# Patient Record
Sex: MALE | Race: BLACK OR AFRICAN AMERICAN | Employment: FULL TIME | URBAN - METROPOLITAN AREA
[De-identification: names, ages, dates, MRNs, and addresses within clinical notes are randomized per-mention and may not be internally consistent; named-entity substitution may affect disease eponyms.]

---

## 2017-04-28 PROCEDURE — 88112 CYTOPATH CELL ENHANCE TECH: CPT | Performed by: UROLOGY

## 2017-05-01 ENCOUNTER — HOSPITAL ENCOUNTER (OUTPATIENT)
Dept: LAB | Age: 62
Discharge: HOME | End: 2017-05-01

## 2017-11-03 ENCOUNTER — HOSPITAL ENCOUNTER (OUTPATIENT)
Dept: LAB | Age: 62
Discharge: HOME | End: 2017-11-03

## 2017-11-03 PROCEDURE — 88112 CYTOPATH CELL ENHANCE TECH: CPT | Performed by: UROLOGY

## 2017-11-08 ENCOUNTER — HOSPITAL ENCOUNTER (OUTPATIENT)
Dept: SLEEP MEDICINE | Age: 62
Discharge: HOME | End: 2017-11-08
Payer: COMMERCIAL

## 2017-11-08 PROCEDURE — 95806 SLEEP STUDY UNATT&RESP EFFT: CPT

## 2017-12-06 PROBLEM — E66.9 OBESITY (BMI 30.0-34.9): Status: ACTIVE | Noted: 2017-12-06

## 2017-12-06 PROBLEM — G47.33 OSA (OBSTRUCTIVE SLEEP APNEA): Status: ACTIVE | Noted: 2017-12-06

## 2017-12-06 PROBLEM — G47.10 HYPERSOMNIA: Status: ACTIVE | Noted: 2017-12-06

## 2018-01-24 PROBLEM — E66.09 CLASS 1 OBESITY DUE TO EXCESS CALORIES WITH SERIOUS COMORBIDITY AND BODY MASS INDEX (BMI) OF 34.0 TO 34.9 IN ADULT: Status: ACTIVE | Noted: 2018-01-24

## 2018-02-13 ENCOUNTER — ANESTHESIA EVENT (OUTPATIENT)
Dept: ENDOSCOPY | Age: 63
End: 2018-02-13
Payer: COMMERCIAL

## 2018-02-13 RX ORDER — SODIUM CHLORIDE, SODIUM LACTATE, POTASSIUM CHLORIDE, CALCIUM CHLORIDE 600; 310; 30; 20 MG/100ML; MG/100ML; MG/100ML; MG/100ML
100 INJECTION, SOLUTION INTRAVENOUS CONTINUOUS
Status: CANCELLED | OUTPATIENT
Start: 2018-02-13

## 2018-02-13 RX ORDER — SODIUM CHLORIDE 0.9 % (FLUSH) 0.9 %
5-10 SYRINGE (ML) INJECTION AS NEEDED
Status: CANCELLED | OUTPATIENT
Start: 2018-02-13

## 2018-02-14 ENCOUNTER — ANESTHESIA (OUTPATIENT)
Dept: ENDOSCOPY | Age: 63
End: 2018-02-14
Payer: COMMERCIAL

## 2018-02-14 ENCOUNTER — HOSPITAL ENCOUNTER (OUTPATIENT)
Age: 63
Setting detail: OUTPATIENT SURGERY
Discharge: HOME | End: 2018-02-14
Attending: SURGERY | Admitting: SURGERY
Payer: COMMERCIAL

## 2018-02-14 VITALS
TEMPERATURE: 96.8 F | WEIGHT: 246 LBS | OXYGEN SATURATION: 98 % | RESPIRATION RATE: 16 BRPM | HEIGHT: 71 IN | HEART RATE: 69 BPM | BODY MASS INDEX: 34.44 KG/M2 | DIASTOLIC BLOOD PRESSURE: 79 MMHG | SYSTOLIC BLOOD PRESSURE: 145 MMHG

## 2018-02-14 LAB — GLUCOSE BLD STRIP.AUTO-MCNC: 123 MG/DL (ref 65–100)

## 2018-02-14 PROCEDURE — 82962 GLUCOSE BLOOD TEST: CPT

## 2018-02-14 PROCEDURE — 74011250636 HC RX REV CODE- 250/636

## 2018-02-14 PROCEDURE — 74011250636 HC RX REV CODE- 250/636: Performed by: ANESTHESIOLOGY

## 2018-02-14 PROCEDURE — 76060000032 HC ANESTHESIA 0.5 TO 1 HR: Performed by: SURGERY

## 2018-02-14 PROCEDURE — 76040000025: Performed by: SURGERY

## 2018-02-14 RX ORDER — FAMOTIDINE 20 MG/1
20 TABLET, FILM COATED ORAL AS NEEDED
Status: DISCONTINUED | OUTPATIENT
Start: 2018-02-14 | End: 2018-02-14 | Stop reason: HOSPADM

## 2018-02-14 RX ORDER — SODIUM CHLORIDE, SODIUM LACTATE, POTASSIUM CHLORIDE, CALCIUM CHLORIDE 600; 310; 30; 20 MG/100ML; MG/100ML; MG/100ML; MG/100ML
100 INJECTION, SOLUTION INTRAVENOUS CONTINUOUS
Status: DISCONTINUED | OUTPATIENT
Start: 2018-02-14 | End: 2018-02-14 | Stop reason: HOSPADM

## 2018-02-14 RX ORDER — PROPOFOL 10 MG/ML
INJECTION, EMULSION INTRAVENOUS
Status: DISCONTINUED | OUTPATIENT
Start: 2018-02-14 | End: 2018-02-14 | Stop reason: HOSPADM

## 2018-02-14 RX ORDER — PROPOFOL 10 MG/ML
INJECTION, EMULSION INTRAVENOUS AS NEEDED
Status: DISCONTINUED | OUTPATIENT
Start: 2018-02-14 | End: 2018-02-14 | Stop reason: HOSPADM

## 2018-02-14 RX ADMIN — PROPOFOL 50 MG: 10 INJECTION, EMULSION INTRAVENOUS at 08:56

## 2018-02-14 RX ADMIN — SODIUM CHLORIDE, SODIUM LACTATE, POTASSIUM CHLORIDE, AND CALCIUM CHLORIDE 100 ML/HR: 600; 310; 30; 20 INJECTION, SOLUTION INTRAVENOUS at 08:29

## 2018-02-14 RX ADMIN — PROPOFOL 200 MCG/KG/MIN: 10 INJECTION, EMULSION INTRAVENOUS at 08:56

## 2018-02-14 NOTE — PROGRESS NOTES
Discharge instructions and discharge med list given to pt's wife, Oneida Orozco, voiced understanding.

## 2018-02-14 NOTE — ANESTHESIA POSTPROCEDURE EVALUATION
Post-Anesthesia Evaluation and Assessment    Patient: Kiesha Sousa MRN: [id number]  SSN: [id number]    YOB: 1955  Age: 60 y.o. Sex: male       Cardiovascular Function/Vital Signs  Visit Vitals    /79    Pulse 69    Temp 36 °C (96.8 °F)    Resp 16    Ht 5' 11\" (1.803 m)    Wt 111.6 kg (246 lb)    SpO2 98%    BMI 34.31 kg/m2       Patient is status post total IV anesthesia anesthesia for Procedure(s):  COLONOSCOPY / BMI=34. Nausea/Vomiting: None    Postoperative hydration reviewed and adequate. Pain:  Pain Scale 1: Numeric (0 - 10) (02/14/18 0943)  Pain Intensity 1: 0 (02/14/18 0943)   Managed    Neurological Status: At baseline    Mental Status and Level of Consciousness: Arousable    Pulmonary Status:   O2 Device: Room air (02/14/18 0943)   Adequate oxygenation and airway patent    Complications related to anesthesia: None    Post-anesthesia assessment completed.  No concerns    Signed By: Sophia Figueredo MD     February 14, 2018

## 2018-02-14 NOTE — ANESTHESIA PREPROCEDURE EVALUATION
Anesthetic History   No history of anesthetic complications            Review of Systems / Medical History  Patient summary reviewed and pertinent labs reviewed    Pulmonary        Sleep apnea: CPAP           Neuro/Psych   Within defined limits           Cardiovascular    Hypertension: well controlled              Exercise tolerance: >4 METS     GI/Hepatic/Renal     GERD: well controlled           Endo/Other    Diabetes: well controlled, type 2    Obesity and arthritis    Comments: Borderline DM, no meds Other Findings              Physical Exam    Airway  Mallampati: II  TM Distance: > 6 cm  Neck ROM: normal range of motion   Mouth opening: Normal     Cardiovascular  Regular rate and rhythm,  S1 and S2 normal,  no murmur, click, rub, or gallop  Rhythm: regular  Rate: normal         Dental  No notable dental hx       Pulmonary  Breath sounds clear to auscultation               Abdominal  GI exam deferred       Other Findings            Anesthetic Plan    ASA: 2  Anesthesia type: total IV anesthesia          Induction: Intravenous  Anesthetic plan and risks discussed with: Patient

## 2018-02-14 NOTE — INTERVAL H&P NOTE
H&P Update:  Lucho Hollingsworth. was seen and examined. History and physical has been reviewed. The patient has been examined.  There have been no significant clinical changes since the completion of the originally dated History and Physical.    Signed By: Mars Trivedi MD     February 14, 2018 7:10 AM

## 2018-02-14 NOTE — OP NOTES
[facility] REPORT    Salazar Ny  MR#: [id number]  : 1955  ACCOUNT #: [de-identified]   DATE OF SERVICE: 2018    PREOPERATIVE DIAGNOSIS:  Personal history of colon polyps. POSTOPERATIVE DIAGNOSES:  1. Good bowel prep. 2.  Grade I internal hemorrhoids. 3.  No masses, polyps or bleeding noted. PROCEDURE PERFORMED:  Colonoscopy. SURGEON:  Samson France MD.     ASSISTANT:  None     ANESTHESIA:  Monitored anesthesia care with Dr. Medina Patel and Casimiro Moncada.    ESTIMATED BLOOD LOSS:  None. SPECIMENS REMOVED:  None. COMPLICATIONS:  None. IMPLANTS:  None. HISTORY OF PRESENT ILLNESS:  This is a 44-year-old male who was referred by Dr. Eli Quinteros for a colonoscopy. He had a personal history of colon polyps and one was recommended at this point. I saw the patient in the office And went through the procedure, risks of bleeding, infection, anesthesia, perforation of the colon. He was agreeable. The patient underwent a bowel prep on 2018 and came in today for the procedure. DESCRIPTION OF PROCEDURE:  He was seen in the preoperative area and then transported to room #4 at [facility]. He was placed in a stretcher in left lateral decubitus position. A timeout was done identifying the patient, surgical procedure and his birthdate of 1955. When everyone in the room agreed, we began the procedure. Monitored anesthesia care was done by Dr. Medina Patel and Julian Nugent. Once the patient had monitored anesthesia care done and once the sedative effect had taken hold, a colonoscope was advanced through the anus and all the way to the cecum. Cecum was identified with the ileocecal valve, cecal folds, external compression in the right lower quadrant corresponded to an indentation seen with the scope. Scope was slowly withdrawn over a 7-minute period of time.   There were no lesions noted in the cecum, ascending colon, transverse colon, descending colon and sigmoid colon. Scope was brought back to the rectum where it was retroflexed. He had some grade I internal hemorrhoids. There were no masses, no polyps, no bleeding noted. Scope was withdrawn. Digital rectal exam revealed good sphincter tone. Prostate mildly enlarged. No nodules palpated. FINDINGS:   1. Good bowel prep. 2.  Grade I internal hemorrhoids. 3.  No masses, polyps. PLAN:  Recommend repeat in ten years.       MD JOSEFA Brewster / CAYLA  D: 02/14/2018 09:20     T: 02/14/2018 09:54  JOB #: [id number]  CC: Estephania Guerra MD

## 2018-02-14 NOTE — IP AVS SNAPSHOT
Jovanny Rob 
 
 
 [address] [facility] [address] [facility] 
[phone] Patient: Georgiana Cortez. MRN: [id number] ZEC:1/1/7798 About your hospitalization You were admitted on:  February 14, 2018 You last received care in the:  [facility] You were discharged on:  February 14, 2018 Why you were hospitalized Your primary diagnosis was:  Not on File Follow-up Information None Your Scheduled Appointments Friday March 02, 2018  9:20 AM EST  
(Arrive by 9:15 AM) Return appointment with 8 [address] [place] [address] [address] ([facility]) [address] [place] [address]  
[phone] Discharge Orders None A check alexx indicates which time of day the medication should be taken. My Medications ASK your doctor about these medications Instructions Each Dose to Equal  
 Morning Noon Evening Bedtime  
 aspirin delayed-release 81 mg tablet Your last dose was: Your next dose is: Take 81 mg by mouth every morning. Stopped 2/7/18 per dr Faisal Marshall 81 mg  
    
   
   
   
  
 cloNIDine HCl 0.1 mg tablet Commonly known as:  CATAPRES Your last dose was: Your next dose is: Take 1 Tab by mouth two (2) times a day. 0.1 mg  
    
   
   
   
  
 hydrALAZINE 100 mg tablet Commonly known as:  APRESOLINE Your last dose was: Your next dose is: Take 1 Tab by mouth two (2) times a day. 100 mg  
    
   
   
   
  
 potassium chloride 20 mEq tablet Commonly known as:  K-DUR, KLOR-CON Your last dose was: Your next dose is:    
   
   
 take 1 tablet by mouth once daily BEFORE LUNCH  
     
   
   
   
  
 raNITIdine 150 mg tablet Commonly known as:  ZANTAC Your last dose was: Your next dose is: Take 1 Tab by mouth as needed.   
 150 mg  
    
   
 simvastatin 20 mg tablet Commonly known as:  ZOCOR Your last dose was: Your next dose is: Take 1 Tab by mouth nightly. 20 mg  
    
   
   
   
  
 triamterene-hydroCHLOROthiazide 37.5-25 mg per capsule Commonly known as:  Johanna Bowles Your last dose was: Your next dose is:    
   
   
 take 1 capsule by mouth every morning Discharge Instructions Gastrointestinal Colonoscopy/Flexible Sigmoidoscopy - Lower Exam Discharge Instructions 1. Call Dr. Marilou Mead at [phone] for any problems or questions. 2. Contact the doctors office for follow up appointment as directed 3. Medication may cause drowsiness for several hours, therefore, do not drive or operate machinery for remainder of the day. 4. No alcohol today. 5. Ordinarily, you may resume regular diet and activity after exam unless otherwise specified by your physician. 6. Because of air put into your colon during exam, you may experience some abdominal distension, relieved by the passage of gas, for several hours. 7. Contact your physician if you have any of the following: 
a. Excessive amount of bleeding  large amount when having a bowel movement. Occasional specks of blood with bowel movement would not be unusual. 
b. Severe abdominal pain 
c. Fever or Chills Any additional instructions:   
Repeat Colonoscopy in 10 years. Instructions given to Sweta Oakley. and other family members. Instructions given by:  Caroline Goodwin RN Introducing [facility]! Dear Jorje Rick: 
Thank you for requesting a [employer] account. Our records indicate that you already have an active [employer] account. You can access your account anytime at [email or web address] Did you know that you can access your hospital and ER discharge instructions at any time in [employer]?   You can also review all of your test results from your hospital stay or ER visit. Additional Information If you have questions, please visit the Frequently Asked Questions section of the [employer] website at [email or web address] Remember, MyChart is NOT to be used for urgent needs. For medical emergencies, dial 911. Now available from your iPhone and Android! Providers Seen During Your Hospitalization Provider Specialty Primary office phone Ann Sheets, [address] [place] [address] Surgery [phone] Your Primary Care Physician (PCP) Primary Care Physician Office Phone Office Fax Tamera Hansen [phone] You are allergic to the following Allergen Reactions Seibert Anaphylaxis Nuts (Tree Nut) Anaphylaxis Tongue and throat swelling with almonds. States that he recently noticed symptoms with eating peanuts. Other Omega-3s Anaphylaxis Please remove-no omega 3 allergy. Almonds cause throat to swell closed-- NOT OMEGA 3 Ace Inhibitors Cough Codeine Nausea and Vomiting Other Medication Swelling Almonds-please remove-already listed Recent Documentation Height Weight BMI Smoking Status 1.803 m 111.6 kg 34.31 kg/m2 Never Smoker Emergency Contacts Name Discharge Info Relation Home Work Mobile Matthew Garces  Spouse [3] [phone] Patient Belongings The following personal items are in your possession at time of discharge: 
  Dental Appliances: None  Visual Aid: Glasses Please provide this summary of care documentation to your next provider. Signatures-by signing, you are acknowledging that this After Visit Summary has been reviewed with you and you have received a copy. Patient Signature:  ____________________________________________________________ Date:  ____________________________________________________________ `    
    
 Provider Signature:  ____________________________________________________________ Date:  ____________________________________________________________

## 2018-02-14 NOTE — DISCHARGE INSTRUCTIONS
Gastrointestinal Colonoscopy/Flexible Sigmoidoscopy - Lower Exam Discharge Instructions  1. Call Dr. Robert Claude at [phone] for any problems or questions. 2. Contact the doctors office for follow up appointment as directed  3. Medication may cause drowsiness for several hours, therefore, do not drive or operate machinery for remainder of the day. 4. No alcohol today. 5. Ordinarily, you may resume regular diet and activity after exam unless otherwise specified by your physician. 6. Because of air put into your colon during exam, you may experience some abdominal distension, relieved by the passage of gas, for several hours. 7. Contact your physician if you have any of the following:  a. Excessive amount of bleeding - large amount when having a bowel movement. Occasional specks of blood with bowel movement would not be unusual.  b. Severe abdominal pain  c. Fever or Chills  Any additional instructions:    Repeat Colonoscopy in 10 years. Instructions given to Elsa Layne. and other family members.   Instructions given by:  Sandhya No RN

## 2018-02-14 NOTE — H&P (VIEW-ONLY)
Name: Johnnie Walker MRN: [id number]       : 1955       Age: 60 y.o. Sex: male        Corwin Butler MD       CC:    Chief Complaint   Patient presents with    New Patient     colonoscopy       HPI:  The patient is referred here for a colonoscopy by Dr. Shon Sheffield. He had several polyps removed in  at [facility]. A five year follow up procedure was recommended. Family hx of colon cancer YES. The patient's father had colon cancer      Previous colonoscopy YES.  Last on    BRBPR NO   Melena NO   Loss of appetite NO   Weight loss NO      Change in bowel habits NO       HISTORY:    Past Medical History:   Diagnosis Date    Arthritis     back-     Chronic pain     back- denies pain at this time 3/28/13    Colon polyps 2013    Coronary atherosclerosis of native coronary vessel 3/28/2016    DDD (degenerative disc disease) 2013    lower back    Diabetes mellitus ([address] 2013 HA1C 5.7; no meds, diet controlled    Dyspnea     GERD (gastroesophageal reflux disease)     rarely takes an OTC zantac    History of kidney stones     x 1, passed on own    Hypertension     controlled by medication    Hypertrophy of prostate with urinary obstruction and other lower urinary tract symptoms (LUTS) 2014    Hypertrophy of prostate with urinary obstruction and other lower urinary tract symptoms (LUTS)     Malignant neoplasm of bladder, part unspecified     monitored and treated by Urology    Morbid obesity ([address]     bmi 34    Pure hypercholesterolemia     managed with medication     Urinary frequency      Past Surgical History:   Procedure Laterality Date    CLOSE CYSTOSTOMY  2012    several cysto w/ bladder biopsy; was cancer    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 Authorizing Provider   Lancets (ONETOUCH ULTRASOFT LANCETS) misc by Does Not Apply route daily. 1/24/18   Naina Sterling MD   potassium chloride (K-DUR, KLOR-CON) 20 mEq tablet take 1 tablet by mouth once daily BEFORE LUNCH 1/24/18   Naina Sterling MD   simvastatin (ZOCOR) 20 mg tablet Take 1 Tab by mouth nightly. 1/24/18   Naina Sterling MD   triamterene-hydroCHLOROthiazide (DYAZIDE) 37.5-25 mg per capsule take 1 capsule by mouth every morning 1/24/18   Naina Sterling MD   glucose blood VI test strips [facility] ULTRA TEST) strip TEST as directed once daily 1/24/18   Naina Sterling MD   OTHER Glucometer 1/24/18   Naina Sterling MD   hydrALAZINE (APRESOLINE) 100 mg tablet Take 1 Tab by mouth two (2) times a day. 10/19/17   Gala Magana DO   cloNIDine HCl (CATAPRES) 0.1 mg tablet Take 1 Tab by mouth two (2) times a day. 4/20/17   Gala Magana DO   aspirin delayed-release 81 mg tablet Take 81 mg by mouth every morning. Historical Provider   ranitidine (ZANTAC) 150 mg tablet Take 1 Tab by mouth as needed. 1/6/16   Naina Sterling MD   multivitamin (ONE A DAY) tablet Take 1 Tab by mouth every Monday and Friday. Historical Provider     Social History   Substance Use Topics    Smoking status: Never Smoker    Smokeless tobacco: Never Used    Alcohol use No     Family History   Problem Relation Age of Onset    Stroke Mother     Diabetes Mother     Hypertension Mother     Stroke Father     Coronary Artery Disease Father     Cancer Father      prostate    Heart Disease Father     Hypertension Sister     Hypertension Brother     Hypertension Brother      . Allergies   Allergen Reactions    Matador Anaphylaxis    Nuts [Tree Nut] Anaphylaxis     Tongue and throat swelling with almonds. States that he recently noticed symptoms with eating peanuts.      Other Omega-3s Anaphylaxis     Please remove-no omega 3 allergy. Almonds cause throat to swell closed-- NOT OMEGA 3    Ace Inhibitors Cough    Codeine Nausea and Vomiting    Other Medication Swelling     Almonds-please remove-already listed       Physical Exam:     Visit Vitals    /81    Pulse 61    Ht 5' 11\" (1.803 m)    Wt 251 lb 12.8 oz (114.2 kg)    BMI 35.12 kg/m2       General: Alert, oriented, cooperative black male in no acute distress. Resp: Breathing is  non-labored. Lungs clear to auscultation without wheezing or rhonchi   CV: RRR. No murmurs, rubs or gallops appreciated. Abd: soft non-tender and non-distended without peritoneal signs. +bs    Extremities: No clubbing, cyanosis or edema. Strength intact. Assessment/Plan:  Angeles Cano is a 60 y.o. male who is here for a colonoscopy due to a personal history of polyps and a family history of colon cancer. 1. Off ASA for one week, if on aspirin    2. Bowel prep    3. Colonoscopy with MAC. I went through the risks of bleeding, perforation of the colon and cardiopulmonary problems that can develop with the use of anesthesia.     Klever Fitzgerald MD     FACS   2/5/2018  3:34 PM

## 2018-05-11 PROCEDURE — 88112 CYTOPATH CELL ENHANCE TECH: CPT | Performed by: UROLOGY

## 2018-05-14 ENCOUNTER — HOSPITAL ENCOUNTER (OUTPATIENT)
Dept: LAB | Age: 63
Discharge: HOME | End: 2018-05-14

## 2018-05-24 ENCOUNTER — HOSPITAL ENCOUNTER (OUTPATIENT)
Dept: MRI IMAGING | Age: 63
Discharge: HOME | End: 2018-05-24
Attending: UROLOGY
Payer: COMMERCIAL

## 2018-05-24 DIAGNOSIS — C67.9 MALIGNANT NEOPLASM OF URINARY BLADDER, UNSPECIFIED SITE (HCC): ICD-10-CM

## 2018-05-24 LAB — CREAT BLD-MCNC: 0.8 MG/DL (ref 0.8–1.5)

## 2018-05-24 PROCEDURE — 82565 ASSAY OF CREATININE: CPT

## 2018-05-24 PROCEDURE — A9577 INJ MULTIHANCE: HCPCS | Performed by: UROLOGY

## 2018-05-24 PROCEDURE — 74011000258 HC RX REV CODE- 258: Performed by: UROLOGY

## 2018-05-24 PROCEDURE — 74011250636 HC RX REV CODE- 250/636: Performed by: UROLOGY

## 2018-05-24 PROCEDURE — 72197 MRI PELVIS W/O & W/DYE: CPT

## 2018-05-24 RX ORDER — SODIUM CHLORIDE 0.9 % (FLUSH) 0.9 %
10 SYRINGE (ML) INJECTION
Status: COMPLETED | OUTPATIENT
Start: 2018-05-24 | End: 2018-05-24

## 2018-05-24 RX ADMIN — Medication 10 ML: at 11:04

## 2018-05-24 RX ADMIN — SODIUM CHLORIDE 100 ML: 900 INJECTION, SOLUTION INTRAVENOUS at 11:04

## 2018-05-24 RX ADMIN — GADOBENATE DIMEGLUMINE 10 ML: 529 INJECTION, SOLUTION INTRAVENOUS at 11:04

## 2018-11-12 ENCOUNTER — HOSPITAL ENCOUNTER (OUTPATIENT)
Dept: LAB | Age: 63
Discharge: HOME | End: 2018-11-12

## 2018-11-12 PROCEDURE — 88112 CYTOPATH CELL ENHANCE TECH: CPT

## 2019-12-06 PROCEDURE — 88112 CYTOPATH CELL ENHANCE TECH: CPT

## 2019-12-09 ENCOUNTER — HOSPITAL ENCOUNTER (OUTPATIENT)
Dept: LAB | Age: 64
Discharge: HOME | End: 2019-12-09

## 2020-01-27 PROBLEM — E66.09 CLASS 1 OBESITY DUE TO EXCESS CALORIES WITH SERIOUS COMORBIDITY AND BODY MASS INDEX (BMI) OF 34.0 TO 34.9 IN ADULT: Status: RESOLVED | Noted: 2018-01-24 | Resolved: 2020-01-27

## 2020-05-28 ENCOUNTER — HOSPITAL ENCOUNTER (OUTPATIENT)
Dept: MRI IMAGING | Age: 65
Discharge: HOME | End: 2020-05-28
Attending: UROLOGY
Payer: COMMERCIAL

## 2020-05-28 DIAGNOSIS — R97.20 ELEVATED PSA: ICD-10-CM

## 2020-05-28 PROCEDURE — 74011250636 HC RX REV CODE- 250/636: Performed by: UROLOGY

## 2020-05-28 PROCEDURE — 72197 MRI PELVIS W/O & W/DYE: CPT

## 2020-05-28 PROCEDURE — 74011000258 HC RX REV CODE- 258: Performed by: UROLOGY

## 2020-05-28 PROCEDURE — A9575 INJ GADOTERATE MEGLUMI 0.1ML: HCPCS | Performed by: UROLOGY

## 2020-05-28 RX ORDER — GADOTERATE MEGLUMINE 376.9 MG/ML
20 INJECTION INTRAVENOUS
Status: COMPLETED | OUTPATIENT
Start: 2020-05-28 | End: 2020-05-28

## 2020-05-28 RX ORDER — SODIUM CHLORIDE 0.9 % (FLUSH) 0.9 %
10 SYRINGE (ML) INJECTION
Status: COMPLETED | OUTPATIENT
Start: 2020-05-28 | End: 2020-05-28

## 2020-05-28 RX ADMIN — GADOTERATE MEGLUMINE 20 ML: 376.9 INJECTION INTRAVENOUS at 08:17

## 2020-05-28 RX ADMIN — Medication 10 ML: at 08:17

## 2020-05-28 RX ADMIN — SODIUM CHLORIDE 100 ML: 900 INJECTION, SOLUTION INTRAVENOUS at 08:17

## 2021-06-30 ENCOUNTER — ANESTHESIA EVENT (OUTPATIENT)
Dept: SURGERY | Age: 66
End: 2021-06-30
Payer: COMMERCIAL

## 2021-07-01 ENCOUNTER — ANESTHESIA (OUTPATIENT)
Dept: SURGERY | Age: 66
End: 2021-07-01
Payer: COMMERCIAL

## 2021-07-01 ENCOUNTER — HOSPITAL ENCOUNTER (OUTPATIENT)
Age: 66
Setting detail: OUTPATIENT SURGERY
Discharge: HOME | End: 2021-07-01
Attending: UROLOGY | Admitting: UROLOGY
Payer: COMMERCIAL

## 2021-07-01 VITALS
TEMPERATURE: 97.5 F | DIASTOLIC BLOOD PRESSURE: 74 MMHG | RESPIRATION RATE: 15 BRPM | BODY MASS INDEX: 31.66 KG/M2 | WEIGHT: 227 LBS | HEART RATE: 69 BPM | SYSTOLIC BLOOD PRESSURE: 144 MMHG | OXYGEN SATURATION: 97 %

## 2021-07-01 DIAGNOSIS — N21.0 BLADDER STONES: ICD-10-CM

## 2021-07-01 LAB
ANION GAP SERPL CALC-SCNC: 6 MMOL/L (ref 7–16)
APPEARANCE UR: CLEAR
BILIRUB UR QL: NEGATIVE
BUN SERPL-MCNC: 11 MG/DL (ref 8–23)
CALCIUM SERPL-MCNC: 8.9 MG/DL (ref 8.3–10.4)
CHLORIDE SERPL-SCNC: 108 MMOL/L (ref 98–107)
CO2 SERPL-SCNC: 27 MMOL/L (ref 21–32)
COLOR UR: YELLOW
CREAT SERPL-MCNC: 0.82 MG/DL (ref 0.8–1.5)
ERYTHROCYTE [DISTWIDTH] IN BLOOD BY AUTOMATED COUNT: 14.5 % (ref 11.9–14.6)
GLUCOSE BLD STRIP.AUTO-MCNC: 150 MG/DL (ref 65–100)
GLUCOSE SERPL-MCNC: 159 MG/DL (ref 65–100)
GLUCOSE UR STRIP.AUTO-MCNC: NEGATIVE MG/DL
HCT VFR BLD AUTO: 42.6 % (ref 41.1–50.3)
HGB BLD-MCNC: 13.2 G/DL (ref 13.6–17.2)
HGB UR QL STRIP: NEGATIVE
KETONES UR QL STRIP.AUTO: NEGATIVE MG/DL
LEUKOCYTE ESTERASE UR QL STRIP.AUTO: NEGATIVE
MCH RBC QN AUTO: 26.3 PG (ref 26.1–32.9)
MCHC RBC AUTO-ENTMCNC: 31 G/DL (ref 31.4–35)
MCV RBC AUTO: 84.9 FL (ref 79.6–97.8)
NITRITE UR QL STRIP.AUTO: NEGATIVE
NRBC # BLD: 0 K/UL (ref 0–0.2)
PH UR STRIP: 5.5 [PH] (ref 5–9)
PLATELET # BLD AUTO: 145 K/UL (ref 150–450)
PMV BLD AUTO: 12.3 FL (ref 9.4–12.3)
POTASSIUM BLD-SCNC: 3.9 MMOL/L (ref 3.5–5.1)
POTASSIUM SERPL-SCNC: 3.8 MMOL/L (ref 3.5–5.1)
PROT UR STRIP-MCNC: NEGATIVE MG/DL
RBC # BLD AUTO: 5.02 M/UL (ref 4.23–5.6)
SERVICE CMNT-IMP: ABNORMAL
SODIUM SERPL-SCNC: 141 MMOL/L (ref 138–145)
SP GR UR REFRACTOMETRY: 1.02 (ref 1–1.02)
UROBILINOGEN UR QL STRIP.AUTO: 0.2 EU/DL (ref 0.2–1)
WBC # BLD AUTO: 5.4 K/UL (ref 4.3–11.1)

## 2021-07-01 PROCEDURE — 76210000006 HC OR PH I REC 0.5 TO 1 HR: Performed by: UROLOGY

## 2021-07-01 PROCEDURE — 87086 URINE CULTURE/COLONY COUNT: CPT

## 2021-07-01 PROCEDURE — 74011250636 HC RX REV CODE- 250/636: Performed by: UROLOGY

## 2021-07-01 PROCEDURE — 74011250636 HC RX REV CODE- 250/636: Performed by: NURSE ANESTHETIST, CERTIFIED REGISTERED

## 2021-07-01 PROCEDURE — 80048 BASIC METABOLIC PNL TOTAL CA: CPT

## 2021-07-01 PROCEDURE — 84132 ASSAY OF SERUM POTASSIUM: CPT

## 2021-07-01 PROCEDURE — 77030033648: Performed by: UROLOGY

## 2021-07-01 PROCEDURE — 76060000033 HC ANESTHESIA 1 TO 1.5 HR: Performed by: UROLOGY

## 2021-07-01 PROCEDURE — 2709999900 HC NON-CHARGEABLE SUPPLY: Performed by: UROLOGY

## 2021-07-01 PROCEDURE — 81003 URINALYSIS AUTO W/O SCOPE: CPT

## 2021-07-01 PROCEDURE — 82355 CALCULUS ANALYSIS QUAL: CPT

## 2021-07-01 PROCEDURE — 82962 GLUCOSE BLOOD TEST: CPT

## 2021-07-01 PROCEDURE — 74011250637 HC RX REV CODE- 250/637: Performed by: ANESTHESIOLOGY

## 2021-07-01 PROCEDURE — 77030040361 HC SLV COMPR DVT MDII -B: Performed by: UROLOGY

## 2021-07-01 PROCEDURE — 85027 COMPLETE CBC AUTOMATED: CPT

## 2021-07-01 PROCEDURE — 52318 REMOVE BLADDER STONE: CPT | Performed by: UROLOGY

## 2021-07-01 PROCEDURE — 74011250636 HC RX REV CODE- 250/636: Performed by: ANESTHESIOLOGY

## 2021-07-01 PROCEDURE — 76010000138 HC OR TIME 0.5 TO 1 HR: Performed by: UROLOGY

## 2021-07-01 PROCEDURE — 88300 SURGICAL PATH GROSS: CPT

## 2021-07-01 PROCEDURE — 77030019927 HC TBNG IRR CYSTO BAXT -A: Performed by: UROLOGY

## 2021-07-01 PROCEDURE — 77030018831 HC SOL IRR H20 BAXT -A: Performed by: UROLOGY

## 2021-07-01 PROCEDURE — 77030010509 HC AIRWY LMA MSK TELE -A: Performed by: NURSE ANESTHETIST, CERTIFIED REGISTERED

## 2021-07-01 PROCEDURE — 74011000250 HC RX REV CODE- 250: Performed by: NURSE ANESTHETIST, CERTIFIED REGISTERED

## 2021-07-01 PROCEDURE — 76210000020 HC REC RM PH II FIRST 0.5 HR: Performed by: UROLOGY

## 2021-07-01 RX ORDER — PHENAZOPYRIDINE HYDROCHLORIDE 200 MG/1
200 TABLET, FILM COATED ORAL
Qty: 30 TABLET | Refills: 3 | Status: SHIPPED | OUTPATIENT
Start: 2021-07-01 | End: 2021-07-04

## 2021-07-01 RX ORDER — ONDANSETRON 2 MG/ML
INJECTION INTRAMUSCULAR; INTRAVENOUS AS NEEDED
Status: DISCONTINUED | OUTPATIENT
Start: 2021-07-01 | End: 2021-07-01 | Stop reason: HOSPADM

## 2021-07-01 RX ORDER — LIDOCAINE HYDROCHLORIDE 10 MG/ML
0.3 INJECTION INFILTRATION; PERINEURAL ONCE
Status: DISCONTINUED | OUTPATIENT
Start: 2021-07-01 | End: 2021-07-01 | Stop reason: HOSPADM

## 2021-07-01 RX ORDER — PROPOFOL 10 MG/ML
INJECTION, EMULSION INTRAVENOUS AS NEEDED
Status: DISCONTINUED | OUTPATIENT
Start: 2021-07-01 | End: 2021-07-01 | Stop reason: HOSPADM

## 2021-07-01 RX ORDER — CEFAZOLIN SODIUM/WATER 2 G/20 ML
2 SYRINGE (ML) INTRAVENOUS
Status: COMPLETED | OUTPATIENT
Start: 2021-07-01 | End: 2021-07-01

## 2021-07-01 RX ORDER — OXYCODONE HYDROCHLORIDE 5 MG/1
10 TABLET ORAL
Status: DISCONTINUED | OUTPATIENT
Start: 2021-07-01 | End: 2021-07-01 | Stop reason: HOSPADM

## 2021-07-01 RX ORDER — SODIUM CHLORIDE, SODIUM LACTATE, POTASSIUM CHLORIDE, CALCIUM CHLORIDE 600; 310; 30; 20 MG/100ML; MG/100ML; MG/100ML; MG/100ML
100 INJECTION, SOLUTION INTRAVENOUS CONTINUOUS
Status: DISCONTINUED | OUTPATIENT
Start: 2021-07-01 | End: 2021-07-01 | Stop reason: HOSPADM

## 2021-07-01 RX ORDER — HYDROMORPHONE HYDROCHLORIDE 1 MG/ML
0.5 INJECTION, SOLUTION INTRAMUSCULAR; INTRAVENOUS; SUBCUTANEOUS
Status: DISCONTINUED | OUTPATIENT
Start: 2021-07-01 | End: 2021-07-01 | Stop reason: HOSPADM

## 2021-07-01 RX ORDER — LIDOCAINE HYDROCHLORIDE 20 MG/ML
INJECTION, SOLUTION EPIDURAL; INFILTRATION; INTRACAUDAL; PERINEURAL AS NEEDED
Status: DISCONTINUED | OUTPATIENT
Start: 2021-07-01 | End: 2021-07-01 | Stop reason: HOSPADM

## 2021-07-01 RX ORDER — MIDAZOLAM HYDROCHLORIDE 1 MG/ML
2 INJECTION, SOLUTION INTRAMUSCULAR; INTRAVENOUS
Status: DISCONTINUED | OUTPATIENT
Start: 2021-07-01 | End: 2021-07-01 | Stop reason: HOSPADM

## 2021-07-01 RX ORDER — FENTANYL CITRATE 50 UG/ML
INJECTION, SOLUTION INTRAMUSCULAR; INTRAVENOUS AS NEEDED
Status: DISCONTINUED | OUTPATIENT
Start: 2021-07-01 | End: 2021-07-01 | Stop reason: HOSPADM

## 2021-07-01 RX ORDER — ACETAMINOPHEN 500 MG
1000 TABLET ORAL ONCE
Status: COMPLETED | OUTPATIENT
Start: 2021-07-01 | End: 2021-07-01

## 2021-07-01 RX ADMIN — CEFAZOLIN 2 G: 1 INJECTION, POWDER, FOR SOLUTION INTRAVENOUS at 07:40

## 2021-07-01 RX ADMIN — ONDANSETRON 4 MG: 2 INJECTION INTRAMUSCULAR; INTRAVENOUS at 08:00

## 2021-07-01 RX ADMIN — PROPOFOL 200 MG: 10 INJECTION, EMULSION INTRAVENOUS at 07:36

## 2021-07-01 RX ADMIN — FENTANYL CITRATE 50 MCG: 50 INJECTION INTRAMUSCULAR; INTRAVENOUS at 07:36

## 2021-07-01 RX ADMIN — ACETAMINOPHEN 1000 MG: 500 TABLET ORAL at 06:32

## 2021-07-01 RX ADMIN — LIDOCAINE HYDROCHLORIDE 100 MG: 20 INJECTION, SOLUTION EPIDURAL; INFILTRATION; INTRACAUDAL; PERINEURAL at 07:36

## 2021-07-01 RX ADMIN — SODIUM CHLORIDE, SODIUM LACTATE, POTASSIUM CHLORIDE, AND CALCIUM CHLORIDE 100 ML/HR: 600; 310; 30; 20 INJECTION, SOLUTION INTRAVENOUS at 06:32

## 2021-07-01 NOTE — BRIEF OP NOTE
Brief Postoperative Note    Patient: Bryant Reese. YOB: 1955  MRN: [id number]    Date of Procedure: 7/1/2021     Pre-Op Diagnosis: Bladder stone [N21.0]    Post-Op Diagnosis: Same as preoperative diagnosis.       Procedure(s):  CYSTOLITHOLAPAXY    Surgeon(s):  Deloris Goodson MD    Surgical Assistant: None    Anesthesia: General     Estimated Blood Loss (mL): Minimal    Complications: None    Specimens:   ID Type Source Tests Collected by Time Destination   1 : bladder stones Fresh Bladder  Deloris Goodson MD 7/1/2021 7716 Pathology        Implants: * No implants in log *    Drains: * No LDAs found *    Findings: see op note    Electronically Signed by Jose Lion MD on 7/1/2021 at 8:25 AM

## 2021-07-01 NOTE — ANESTHESIA POSTPROCEDURE EVALUATION
Procedure(s):  CYSTOLITHOLAPAXY.     general    Anesthesia Post Evaluation      Multimodal analgesia: multimodal analgesia used between 6 hours prior to anesthesia start to PACU discharge  Patient location during evaluation: PACU  Patient participation: complete - patient participated  Level of consciousness: awake and alert  Pain management: adequate  Airway patency: patent  Anesthetic complications: no  Cardiovascular status: acceptable  Respiratory status: acceptable  Hydration status: acceptable  Post anesthesia nausea and vomiting:  none      INITIAL Post-op Vital signs:   Vitals Value Taken Time   /74 07/01/21 0900   Temp 36.4 °C (97.5 °F) 07/01/21 0900   Pulse 69 07/01/21 0900   Resp 15 07/01/21 0900   SpO2 97 % 07/01/21 0900

## 2021-07-01 NOTE — DISCHARGE INSTRUCTIONS
Cystoscopy: What to Expect at [address]  A cystoscopy is a procedure that lets a doctor look inside of the bladder and the urethra. The urethra is the tube that carries urine from the bladder to outside the body. The doctor uses a thin, lighted tube called a cystoscope to look for kidney or bladder stones, tumors, bleeding, or infection. After the cystoscopy, your urethra may be sore at first, and it may burn when you urinate for the first few days after the procedure. You may feel the need to urinate more often, and your urine may be pink. These symptoms should get better in 1 or 2 days. You will probably be able to go back to work or most of your usual activities in 1 or 2 days. How can you care for yourself at home? Activity  · Rest when you feel tired. Getting enough sleep will help you recover. · Try to walk each day. Start by walking a little more than you did the day before. Bit by bit, increase the amount you walk. Walking boosts blood flow and helps prevent pneumonia and constipation. · Avoid strenuous activities, such as bicycle riding, jogging, weight lifting, or aerobic exercise, until your doctor says it is okay. · Ask your doctor when you can drive again. · Most people are able to return to work within 1 or 2 days after the procedure. · You may shower and take baths as usual.  · Ask your doctor when it is okay for you to have sex. Diet  · You can eat your normal diet. If your stomach is upset, try bland, low-fat foods like plain rice, broiled chicken, toast, and yogurt. · Drink plenty of fluids (unless your doctor tells you not to). Medicines  · Take pain medicines exactly as directed. ¨ If the doctor gave you a prescription medicine for pain, take it as prescribed. ¨ If you are not taking a prescription pain medicine, ask your doctor if you can take an over-the-counter medicine.   · If you think your pain medicine is making you sick to your stomach:  ¨ Take your medicine after meals (unless your doctor has told you not to). ¨ Ask your doctor for a different pain medicine. · If your doctor prescribed antibiotics, take them as directed. Do not stop taking them just because you feel better. You need to take the full course of antibiotics. Medication Interaction:  During your procedure you potentially received a medication or medications which may reduce the effectiveness of oral contraceptives. Please consider other forms of contraception for 1 month following your procedure if you are currently using oral contraceptives as your primary form of birth control. In addition to this, we recommend continuing your oral contraceptive as prescribed, unless otherwise instructed by your physician, during this time. Follow-up care is a key part of your treatment and safety. Be sure to make and go to all appointments, and call your doctor if you are having problems. It's also a good idea to know your test results and keep a list of the medicines you take. When should you call for help? Call 911 anytime you think you may need emergency care. For example, call if:  · You passed out (lost consciousness). · You have severe trouble breathing. · You have sudden chest pain and shortness of breath, or you cough up blood. · You have severe belly pain. Call your doctor now or seek immediate medical care if:  · You are sick to your stomach or cannot keep fluids down. · Your urine is still red or you see blood clots after you have urinated several times. · You have trouble passing urine or stool, especially if you have pain or swelling in your lower belly. · You have signs of a blood clot, such as:  ¨ Pain in your calf, back of the knee, thigh, or groin. ¨ Redness and swelling in your leg or groin. · You develop a fever or severe chills. · You have pain in your back just below your rib cage. This is called flank pain.   Watch closely for changes in your health, and be sure to contact your doctor if: · A burning feeling is normal for a day or two after the test, but call if it does not get better. · You have a frequent urge to urinate but can pass only small amounts of urine. · It is normal for the urine to have a pinkish color for a few days after the test, but call if it does not get better or if your urine is cloudy, smells bad, or has pus in it. After general anesthesia or intravenous sedation, for 24 hours or while taking prescription Narcotics:  · Limit your activities  · A responsible adult needs to be with you for the next 24 hours  · Do not drive and operate hazardous machinery  · Do not make important personal or business decisions  · Do not drink alcoholic beverages  · If you have not urinated within 8 hours after discharge, and you are experiencing discomfort from urinary retention, please go to the nearest ED. · If you have sleep apnea and have a CPAP machine, please use it for all naps and sleeping. · Please use caution when taking narcotics and any of your home medications that may cause drowsiness. *  Please give a list of your current medications to your Primary Care Provider. *  Please update this list whenever your medications are discontinued, doses are      changed, or new medications (including over-the-counter products) are added. *  Please carry medication information at all times in case of emergency situations. These are general instructions for a healthy lifestyle:  No smoking/ No tobacco products/ Avoid exposure to second hand smoke  Surgeon General's Warning:  Quitting smoking now greatly reduces serious risk to your health.   Obesity, smoking, and sedentary lifestyle greatly increases your risk for illness  A healthy diet, regular physical exercise & weight monitoring are important for maintaining a healthy lifestyle    You may be retaining fluid if you have a history of heart failure or if you experience any of the following symptoms:  Weight gain of 3 pounds or more overnight or 5 pounds in a week, increased swelling in our hands or feet or shortness of breath while lying flat in bed. Please call your doctor as soon as you notice any of these symptoms; do not wait until your next office visit.

## 2021-07-01 NOTE — OP NOTES
[facility]  OPERATIVE REPORT    Name:  Drew Reynolds  MR#:  [id number]  :  1955  ACCOUNT #:  [de-identified]  DATE OF SERVICE:  2021    PREOPERATIVE DIAGNOSIS:  Bladder stones. POSTOPERATIVE DIAGNOSIS:  Bladder stones. PROCEDURES PERFORMED:  Cystoscopy with holmium laser lithotripsy of bladder stones. SURGEON:  Troy Canela MD    ASSISTANT:  None    ANESTHESIA:  General.    COMPLICATIONS:  None. SPECIMENS REMOVED:  Bladder stones. IMPLANTS:  None. ESTIMATED BLOOD LOSS:  minmal.    FINDINGS:  Large prostate with prominent posterior median lobe. Numerous prostate stones, more than 20 stones. There were at least three stones each measuring 1-cm diameter for a total significant aggregate size of 3 cm. PROCEDURE:  The patient was given a general anesthetic, placed in the dorsal lithotomy position. He was prepped and draped in a sterile fashion. I passed a 22-Serbian cystoscope into the urethra using the video camera and 30-degree lens. The anterior urethra was normal.  Prostate shows trilobar hypertrophy. There was a large median lobe posteriorly producing obstruction at the bladder neck as well as large lateral lobes. The scope was passed into the bladder. I was able to visualize the entire bladder mucosa. There was a small diverticulum at the dome. There was no evidence of any bladder tumors. Posterior to the median lobe, there were numerous round stones. Most of these were very small, less than 5 mm in diameter but there were approximately three stones which measured 1 cm in diameter each. We used a Pattie syringe to irrigate numerous stones out and some of these were sent for chemical analysis. The larger stones could not be irrigated out. I used a 1000-micron holmium laser fiber at a setting of 1.0 joules and 10 Hz. The larger stones were fragmented until they were small enough to irrigate out.   We carefully inspected the bladder and there were no stones remaining. There was no significant bleeding noted. At this point, the scope was removed. He was taken to the recovery room in stable condition. PLAN:  He will return to the office in approximately three months. We will call him with the stone chemical analysis.       Silver Barrios MD      [id number]  D:  07/01/2021 8:55  T:  07/01/2021 17:34  JOB #:  [id number]

## 2021-07-01 NOTE — ANESTHESIA PREPROCEDURE EVALUATION
Anesthetic History   No history of anesthetic complications            Review of Systems / Medical History  Patient summary reviewed and pertinent labs reviewed    Pulmonary        Sleep apnea: CPAP           Neuro/Psych   Within defined limits           Cardiovascular    Hypertension: well controlled          CAD (nonobstructive CAD)    Exercise tolerance: >4 METS     GI/Hepatic/Renal     GERD: well controlled           Endo/Other    Diabetes: well controlled, type 2    Obesity and arthritis     Other Findings              Physical Exam    Airway  Mallampati: III  TM Distance: > 6 cm  Neck ROM: normal range of motion   Mouth opening: Normal     Cardiovascular  Regular rate and rhythm,  S1 and S2 normal,  no murmur, click, rub, or gallop  Rhythm: regular  Rate: normal         Dental  No notable dental hx       Pulmonary  Breath sounds clear to auscultation               Abdominal  GI exam deferred       Other Findings            Anesthetic Plan    ASA: 2  Anesthesia type: general          Induction: Intravenous  Anesthetic plan and risks discussed with: Patient

## 2021-07-01 NOTE — H&P
Sandra Aviles. : 1955           HPI   76 y.o., male With hx of bladder cancer.  S/p TURBT 10-8-12, for stage T1 bladder cancer.     Had a papillary tumor at the right dome and right lateral wall. Pathology shows papillary high-grade urothelial carcinoma with   focal superficial lamina propria invasion from the bladder dome   tumor. Muscularis propria was not identified. We also did a separate biopsy of the bladder dome adjacent to the   tumor. It shows high grade urothelial carcinoma with associated   superficial lamina propria invasion, muscularis propria not   identified. The right lateral wall tumor shows papillary high-grade   urothelial carcinoma, definitive features of invasion are not   identified, muscularis propria is not identified. We did a re-biopsy of the resection base. There is focally   necrotic tissue present, no carcinoma noted. Completed 6 week induction course of BCG in . Follow up cysto showed tumor on left dome, flat red area right   dome. S/P TURBT on 13. Papillary tumor left dome shows high grade   papillary tumor, no invasion. Right dome flat area shows focal   features of high grade urothelial carcinoma, Separate fragment of dysplastic urothelium. Small fragment of muscularis present,   uninvolved by tumor.   Cystoscopy in 2013 shows BPH, 4+ trabeculation, no tumors. Atypical cytology with negative fish test.--S/P Cystoscopy with   bladder biopsy on 10/17/13. Pathology revealed urothelial atypia. Has fnished 6 weeks of BCG at end of . Continues on Flomax. Completed  BCG maintenance. Cytology in  showed atypical cells, but negative FISH. Had 3   week BCG in . Negative cystoscopy in 2014. Cytology showed atypical   Cells. Cysto in 4-15 showed flat red area right bladder . Negative FISH. Cystoscopy in 2015 showed that the red flat area was   smaller.  Cytology showed atypical cells.   Finished BCG 3 in September 2015, and 4-16. Finished last BCG 10-28-16.negative cytology in 5-16. Cysto showed red edematous area right anterior bladder. Had MRI to evaluate the bladder mass on 12-17-16: Impression:   1. Small sessile mass in the right anterolateral urinary bladder wall  consistent with patient's known primary urothelial carcinoma, suspect this is a  T3 lesion. 2. No evidence of local perivesicular or pelvic metastatic disease, MR kevin staging N0.  3. Other incidental findings such small urachal diverticulum as above.     Had TUR of the bladder lesion of 12-22-16. Path:   DIAGNOSIS   BLADDER LESION: TISSUE CONSISTENT WITH BLADDER MUCOSA HAVING EDEMA AND CHRONIC INFLAMMATION OF LAMINA PROPRIA WITHOUT SIGNIFICANT LINING MUCOSA     Cytology in 4-17 showed rare atypical cells.   PSA 1.8 in 2017. Cysto in 5-18 showed diverticulum at dome with surrounding cellulitis. Had pelvic MRI in 5-18: IMPRESSION:   1. The previously seen sessile lesion well evident at the right bladder is  significantly decreased and now at the right bladder wall there is only some  nonfocal mild bladder wall thickening which does not enhance-appearance at this  is clearly improved. 2. No evidence for metastatic disease in the pelvis. 3. Stable chronic changes including a small bladder diverticulum at the anterior  dome and prostate enlargement secondary to BPH again seen-these seem stable.   He has started having more urgency since starting a diuretic.   Started Tamsulosin in 2018. He stopped it, no voiding complaints now.   Cysto in 12-19 showed small bladder stones. PSA 4.4 in 4-20. He has no voiding complaints.  Cysto in 5-20 showed bladder stones were larger, no tumors.      MRI 5-28-20: Findings:  PROSTATE SIZE: The gland measures 6.9 x 4.6 x 4.5 cm.     PERIPHERAL GLAND: Few patchy areas of decreased T2 signal throughout the  peripheral zone right greater than left without focal nodule, decreased ADC  signal, or early arterial enhancement.     CENTRAL GLAND: There are T2 hyperintense nodules in the central gland consistent  with hypertrophy.     CAPSULE: The prostatic capsule is maintained without evidence of metastatic  spread.      PERINEURAL: The neurovascular bundles and posterolateral periprostatic fat are  normal without evidence of tumor extension.       SEMINAL VESICLES: The seminal vesicles and ejaculatory ducts are normal.      LYMPH NODES: No pelvic lymph adenopathy.  Limited imaging of the lower abdomen  shows no adenopathy or free fluid.     BONES: No aggressive osseous lesion.     BLADDER AND RECTUM: There is a small diverticulum at the bladder dome best  visualized on the coronal data set series 7 image 7. The bladder is otherwise  unremarkable. Rectum unremarkable.     IMPRESSION  IMPRESSION:  1. No discernible lesion seen. No evidence of disease spread beyond the  prostatic capsule. 2. No lymphadenopathy.       May need cysto and removal of bladder stones, possible MRI fusion biopsy.   PSA 2.9 in 12-20. He has no hematuria or difficulty voiding . PSA 2.8 in 6-21.   Here for cysto.              Past Medical History:   Diagnosis Date    Arthritis       back-     Bladder cancer ([facility] [address]      Chronic pain       back--pain comes and goes    Colon polyps 2/7/2013    Coronary atherosclerosis of native coronary vessel 03/28/2016     no mi/ no stents---- followed by dr Karthik Moreira DDD (degenerative disc disease) 02/07/2013     lower back    Diabetes ([facility] [address]       pre-diabetic    GERD (gastroesophageal reflux disease)       occ med    History of kidney stones       x 1, passed on own    Hypertension       controlled by medication    Hypertrophy of prostate with urinary obstruction and other lower urinary tract symptoms (LUTS) 2/27/2014    Hypertrophy of prostate with urinary obstruction and other lower urinary tract symptoms (LUTS)      Malignant neoplasm of bladder, part unspecified dx- summer 2012     surg/ BCG tx---- followed by dr Lis Gomez obesity ([facility] [address]       bmi 34    Prediabetes       diet controlled---- sqbs avg am- ---- hypo at 80's    Pure hypercholesterolemia       managed with medication     Sleep apnea       wears cpap at hs    Urinary frequency              Past Surgical History:   Procedure Laterality Date    COLONOSCOPY N/A 2/14/2018     COLONOSCOPY / BMI=34 performed by Shannan Lazcano MD at [address] [address]  Right       third finger due to a tumor in finger    HX COLONOSCOPY   2013    HX ORTHOPAEDIC Bilateral 2000's     aura toe surgery (great toe), removal of arthritis from toes    HX TUMOR REMOVAL   1974     scalp    MA CLOSE CYSTOSTOMY   9/2012     several cysto w/ bladder biopsy; was cancer             Current Outpatient Medications   Medication Sig Dispense Refill    hydrALAZINE (APRESOLINE) 100 mg tablet take 1 tablet by mouth twice a day 180 Tab 3    cloNIDine HCL (CATAPRES) 0.2 mg tablet Take 1 Tab by mouth two (2) times a day. 180 Tab 3    metFORMIN (GLUCOPHAGE) 500 mg tablet Take 1 Tab by mouth two (2) times daily (with meals). 180 Tab 3    potassium chloride (K-DUR, KLOR-CON) 20 mEq tablet take 1 tablet by mouth once daily BEFORE LUNCH 90 Tab 3    simvastatin (Zocor) 20 mg tablet Take 1 Tab by mouth nightly. 90 Tab 3    triamterene-hydroCHLOROthiazide (DYAZIDE) 37.5-25 mg per capsule TAKE 1 CAPSULE BY MOUTH EVERY MORNING 90 Cap 3    B.infantis-B.ani-B.long-B.bifi (Probiotic 4X) 10-15 mg TbEC Take  by mouth. PROBIOTIC        OneTouch Delica Plus Lancet 30 gauge misc TEST ONCE DAILY 100 Lancet 3    OTHER One touch Ultra Blue Test Strips - check sugars once daily 30 Strip 11    multivitamin (ONE A DAY) tablet Take 1 Tab by mouth daily.        loratadine (CLARITIN) 10 mg tablet Take 1 Tab by mouth daily. 30 Tab 3    aspirin delayed-release 81 mg tablet Take 81 mg by mouth every morning.  Stopped 2/7/18 per dr Aracely Mckenzie Reactions    Jordan Anaphylaxis    Nuts [Tree Nut] Anaphylaxis       Tongue and throat swelling with almonds. States that he recently noticed symptoms with eating peanuts.  Other Omega-3s Anaphylaxis       Please remove-no omega 3 allergy. Almonds cause throat to swell closed-- NOT OMEGA 3    Ace Inhibitors Cough    Codeine Nausea and Vomiting    Other Medication Swelling       Almonds-please remove-already listed      Social History            Socioeconomic History    Marital status:        Spouse name: Not on file    Number of children: Not on file    Years of education: Not on file    Highest education level: Not on file   Occupational History    Not on file   Tobacco Use    Smoking status: Never Smoker    Smokeless tobacco: Never Used   Substance and Sexual Activity    Alcohol use: No    Drug use: No    Sexual activity: Not on file   Other Topics Concern    Not on file   Social History Narrative    Not on file      Social Determinants of Health          Financial Resource Strain:     Difficulty of Paying Living Expenses:    Food Insecurity:     Worried About Running Out of Food in the Last Year:     Ran Out of Food in the Last Year:    Transportation Needs:     Lack of Transportation (Medical):      Lack of Transportation (Non-Medical):    Physical Activity:     Days of Exercise per Week:     Minutes of Exercise per Session:    Stress:     Feeling of Stress :    Social Connections:     Frequency of Communication with Friends and Family:     Frequency of Social Gatherings with Friends and Family:     Attends Latter day Services:     Active Member of Clubs or Organizations:     Attends Club or Organization Meetings:     Marital Status:    Intimate Partner Violence:     Fear of Current or Ex-Partner:     Emotionally Abused:     Physically Abused:     Sexually Abused:             Family History   Problem Relation Age of Onset    Stroke Mother      Diabetes Mother      Hypertension Mother      Stroke Father      Coronary Artery Disease Father      Cancer Father           prostate    Heart Disease Father      Hypertension Sister      Hypertension Brother      Hypertension Brother           There were no vitals taken for this visit. Physical Exam  General   Mental Status - Patient is alert and oriented X3. Build & Nutrition - Well nourished.        Chest and Lung Exam   Chest and lung exam reveals  - normal excursion with symmetric chest walls, quiet, even and easy respiratory effort with no use of accessory muscles and on auscultation, normal breath sounds, no adventitious sounds and normal vocal resonance.        Cardiovascular   Cardiovascular examination reveals  - normal heart sounds, regular rate and rhythm with no murmurs.        Abdomen   Palpation/Percussion: Palpation and Percussion of the abdomen reveal - Non Tender, No Rebound tenderness, No Rigidity (guarding), No hepatosplenomegaly, No Palpable abdominal masses and Soft.  Hernia - Bilateral - No Hernia(s) present.     UA - Dipstick         Results for orders placed or performed in visit on 06/11/21   AMB POC URINALYSIS DIP STICK AUTO W/O MICRO (PGU)     Status: None   Result Value Ref Range Status     Glucose (UA POC) Negative Negative mg/dL Final     Bilirubin (UA POC) Negative Negative Final     Ketones (UA POC) Negative Negative Final     Specific gravity (UA POC) 1.025 1.001 - 1.035 Final     Blood (UA POC) Negative Negative Final     pH (UA POC) 5.5 4.6 - 8.0 Final     Protein (UA POC) Negative Negative Final     Urobilinogen (POC) 0.2 mg/dL   Final     Nitrites (UA POC) Negative Negative Final     Leukocyte esterase (UA POC) Negative Negative Final   Results for orders placed or performed in visit on 05/18/20   AMB POC URINALYSIS DIP STICK AUTO W/ MICRO (PGU)     Status: None   Result Value Ref Range Status     Glucose (UA POC) Negative Negative mg/dL Final     Bilirubin (UA POC) Negative Negative Final   Ketones (UA POC) Negative Negative Final     Specific gravity (UA POC) 1.025 1.001 - 1.035 Final     Blood (UA POC) Negative Negative Final     pH (UA POC) 6.5 4.6 - 8.0 Final     Protein (UA POC) Negative Negative Final     Urobilinogen (POC) 0.2 mg/dL   Final     Nitrites (UA POC) Negative Negative Final     Leukocyte esterase (UA POC) Trace Negative Final         UA - Micro  WBC - 0  RBC - 0  Bacteria - 0  Epith - 0              Cystoscopy Procedure:      Procedure Room #1  Scope ID:       [id number]  Assistant:       LR        Cystoscopy Procedure Note     Procedure Details   The risks, benefits, complications, treatment options, and expected outcomes were discussed with the patient. The patient concurred with the proposed plan, giving informed consent.     Cystoscopy was performed today under local anesthesia, using sterile technique. The patient was placed in the lithotomy position, prepped with Betadine, and draped in the usual sterile fashion. A  (Flexible)cystoscope was used to inspect both the urethra and bladder using the 30 and 70 degree lenses.     Findings:  Anterior urethra: normal without strictures.     Prostatic urethra:  Trilobar prostatic hyperplasia, without bladder neck contracture.      Bladder: small diverticulum at dome, without lesions. Numerous bladder stones, 10-15, largest 1 cm. Ureteral orifice(s) was/were seen bilateral;                              Complications:  None; patient tolerated the procedure well. Condition: stable           Assessment and Plan      ICD-10-CM ICD-9-CM     1. Malignant neoplasm of urinary bladder, unspecified site (HCC)  C67.9 188.9 AMB POC URINALYSIS DIP STICK AUTO W/O MICRO (PGU)         CYSTOURETHROSCOPY   2. Elevated PSA  R97.20 790.93     3. Bladder stones  N21.0 594. 1     4. BPH with obstruction/lower urinary tract symptoms  N40.1 600.01       N13.8 599.69        Separate E and M encounter to discuss bladder stones.   Bladder stones are more numerous. He has no voiding problems. He is at risk of bladder stones causing retention. Recommend cysto, laser lithotripsy of bladder stones. Risk sof bleeding, infection stricture discussed.

## 2021-07-03 LAB
BACTERIA SPEC CULT: NORMAL
SERVICE CMNT-IMP: NORMAL

## 2021-11-11 PROBLEM — L72.3 SEBACEOUS CYST: Status: ACTIVE | Noted: 2021-11-11

## 2021-11-11 PROBLEM — L72.9 INFECTED CYST OF SKIN: Status: ACTIVE | Noted: 2021-11-11

## 2021-11-11 PROBLEM — L08.9 INFECTED CYST OF SKIN: Status: ACTIVE | Noted: 2021-11-11

## 2022-03-18 PROBLEM — G47.10 HYPERSOMNIA: Status: ACTIVE | Noted: 2017-12-06

## 2022-03-18 PROBLEM — E66.9 OBESITY (BMI 30.0-34.9): Status: ACTIVE | Noted: 2017-12-06

## 2022-03-18 PROBLEM — E66.811 OBESITY (BMI 30.0-34.9): Status: ACTIVE | Noted: 2017-12-06

## 2022-03-18 PROBLEM — L72.3 SEBACEOUS CYST: Status: ACTIVE | Noted: 2021-11-11

## 2022-03-19 PROBLEM — G47.33 OSA (OBSTRUCTIVE SLEEP APNEA): Status: ACTIVE | Noted: 2017-12-06

## 2022-03-20 PROBLEM — L72.9 INFECTED CYST OF SKIN: Status: ACTIVE | Noted: 2021-11-11

## 2022-03-20 PROBLEM — L08.9 INFECTED CYST OF SKIN: Status: ACTIVE | Noted: 2021-11-11

## 2022-04-19 PROBLEM — Z86.16 HISTORY OF COVID-19: Status: ACTIVE | Noted: 2022-04-19

## 2022-07-12 ENCOUNTER — TELEPHONE (OUTPATIENT)
Dept: FAMILY MEDICINE CLINIC | Facility: CLINIC | Age: 67
End: 2022-07-12

## 2022-07-12 DIAGNOSIS — E11.9 TYPE 2 DIABETES MELLITUS WITHOUT COMPLICATION, WITHOUT LONG-TERM CURRENT USE OF INSULIN (HCC): Primary | ICD-10-CM

## 2022-07-19 ENCOUNTER — NURSE ONLY (OUTPATIENT)
Dept: FAMILY MEDICINE CLINIC | Facility: CLINIC | Age: 67
End: 2022-07-19

## 2022-07-19 DIAGNOSIS — E11.9 TYPE 2 DIABETES MELLITUS WITHOUT COMPLICATION, WITHOUT LONG-TERM CURRENT USE OF INSULIN (HCC): ICD-10-CM

## 2022-07-20 LAB
EST. AVERAGE GLUCOSE BLD GHB EST-MCNC: 154 MG/DL
HBA1C MFR BLD: 7 % (ref 4.8–5.6)

## 2022-07-27 ENCOUNTER — OFFICE VISIT (OUTPATIENT)
Dept: FAMILY MEDICINE CLINIC | Facility: CLINIC | Age: 67
End: 2022-07-27
Payer: COMMERCIAL

## 2022-07-27 VITALS
WEIGHT: 226.2 LBS | HEIGHT: 71 IN | TEMPERATURE: 97.1 F | BODY MASS INDEX: 31.67 KG/M2 | OXYGEN SATURATION: 98 % | SYSTOLIC BLOOD PRESSURE: 138 MMHG | DIASTOLIC BLOOD PRESSURE: 70 MMHG | HEART RATE: 87 BPM | RESPIRATION RATE: 16 BRPM

## 2022-07-27 DIAGNOSIS — E11.9 TYPE 2 DIABETES MELLITUS WITHOUT COMPLICATION, WITHOUT LONG-TERM CURRENT USE OF INSULIN (HCC): Primary | ICD-10-CM

## 2022-07-27 DIAGNOSIS — E78.00 HIGH CHOLESTEROL: ICD-10-CM

## 2022-07-27 DIAGNOSIS — I10 ESSENTIAL HYPERTENSION: ICD-10-CM

## 2022-07-27 PROBLEM — Z89.021: Status: ACTIVE | Noted: 2022-07-27

## 2022-07-27 PROBLEM — L72.9 INFECTED CYST OF SKIN: Status: RESOLVED | Noted: 2021-11-11 | Resolved: 2022-07-27

## 2022-07-27 PROBLEM — L08.9 INFECTED CYST OF SKIN: Status: RESOLVED | Noted: 2021-11-11 | Resolved: 2022-07-27

## 2022-07-27 PROBLEM — L72.3 SEBACEOUS CYST: Status: RESOLVED | Noted: 2021-11-11 | Resolved: 2022-07-27

## 2022-07-27 PROCEDURE — G8427 DOCREV CUR MEDS BY ELIG CLIN: HCPCS | Performed by: FAMILY MEDICINE

## 2022-07-27 PROCEDURE — 99214 OFFICE O/P EST MOD 30 MIN: CPT | Performed by: FAMILY MEDICINE

## 2022-07-27 PROCEDURE — 3051F HG A1C>EQUAL 7.0%<8.0%: CPT | Performed by: FAMILY MEDICINE

## 2022-07-27 PROCEDURE — 3017F COLORECTAL CA SCREEN DOC REV: CPT | Performed by: FAMILY MEDICINE

## 2022-07-27 PROCEDURE — 1123F ACP DISCUSS/DSCN MKR DOCD: CPT | Performed by: FAMILY MEDICINE

## 2022-07-27 PROCEDURE — 1036F TOBACCO NON-USER: CPT | Performed by: FAMILY MEDICINE

## 2022-07-27 PROCEDURE — 2022F DILAT RTA XM EVC RTNOPTHY: CPT | Performed by: FAMILY MEDICINE

## 2022-07-27 PROCEDURE — G8417 CALC BMI ABV UP PARAM F/U: HCPCS | Performed by: FAMILY MEDICINE

## 2022-07-27 RX ORDER — SIMVASTATIN 20 MG
20 TABLET ORAL NIGHTLY
Qty: 90 TABLET | Refills: 3 | Status: SHIPPED | OUTPATIENT
Start: 2022-07-27

## 2022-07-27 RX ORDER — TRIAMTERENE AND HYDROCHLOROTHIAZIDE 37.5; 25 MG/1; MG/1
CAPSULE ORAL
Qty: 90 CAPSULE | Refills: 3 | Status: SHIPPED | OUTPATIENT
Start: 2022-07-27 | End: 2022-10-19 | Stop reason: ALTCHOICE

## 2022-07-27 RX ORDER — CLONIDINE HYDROCHLORIDE 0.2 MG/1
0.2 TABLET ORAL EVERY EVENING
Qty: 90 TABLET | Refills: 3
Start: 2022-07-27 | End: 2022-10-19 | Stop reason: ALTCHOICE

## 2022-07-27 RX ORDER — VALSARTAN 40 MG/1
40 TABLET ORAL DAILY
Qty: 90 TABLET | Refills: 3 | Status: SHIPPED | OUTPATIENT
Start: 2022-07-27

## 2022-07-27 ASSESSMENT — PATIENT HEALTH QUESTIONNAIRE - PHQ9
SUM OF ALL RESPONSES TO PHQ9 QUESTIONS 1 & 2: 0
2. FEELING DOWN, DEPRESSED OR HOPELESS: 0
SUM OF ALL RESPONSES TO PHQ QUESTIONS 1-9: 0
SUM OF ALL RESPONSES TO PHQ QUESTIONS 1-9: 0
1. LITTLE INTEREST OR PLEASURE IN DOING THINGS: 0
SUM OF ALL RESPONSES TO PHQ QUESTIONS 1-9: 0
SUM OF ALL RESPONSES TO PHQ QUESTIONS 1-9: 0

## 2022-07-27 NOTE — PROGRESS NOTES
[address] [facility] [address]  Phone: [phone] Fax [phone]  Allyson Wall MD  7/27/2022         Mr. Vicki Howe  is a 78y.o.  year old  male patient who comes in to check on diabetes, hypertension, and high cholesterol. Checks blood sugars once a week. Sugars have been running better and he has really worked on his diet. He has lost weight. He has been taking 2 metformin's twice a day and it has caused some stomach upset so is wondering if he can go back to 1 twice a day since he is worked on his diet and exercise. Last eye exam was last year. Feet have no problems. Did  have a flu shot this year. Did have a pneumonia shot pneumovax 2016 and prevnar 2015 . Did have a tetanus shot 2010. Has  been working on diet and exercise. Blood pressure has been running better. His cardiologist has been working with him on that and they reduced his clonidine to just at night because of sleepiness. He continues on hydralazine 100 mg twice a day. He also continues on triamterene hydrochlorothiazide 37.5/25 daily with potassium daily and valsartan 40 mg daily. Mr. Vicki Howe  has  has a past medical history of Arthritis, Bladder cancer ([address] Chronic pain, Colon polyps, Coronary atherosclerosis of native coronary vessel, COVID-19 vaccine series completed, DDD (degenerative disc disease), Diabetes ([address] GERD (gastroesophageal reflux disease), History of kidney stones, Hypertension, Hypertrophy of prostate with urinary obstruction and other lower urinary tract symptoms (LUTS), Hypertrophy of prostate with urinary obstruction and other lower urinary tract symptoms (LUTS), Malignant neoplasm of bladder, part unspecified, Morbid obesity ([address] Prediabetes, Pure hypercholesterolemia, Sleep apnea, and Urinary frequency.     Mr. Vicki Howe  has  has a past surgical history that includes amputation (Right); close cystostomy (9/2012); close cystostomy (07/2021); tumor removal (1974); Colonoscopy (2013); Colonoscopy (N/A, 2/14/2018); and orthopedic surgery (Bilateral, 2000's). Mr. Edwina Louis   Current Outpatient Medications   Medication Sig Dispense Refill    simvastatin (ZOCOR) 20 MG tablet Take 1 tablet by mouth nightly 90 tablet 3    valsartan (DIOVAN) 40 MG tablet Take 1 tablet by mouth in the morning. 90 tablet 3    triamterene-hydroCHLOROthiazide (DYAZIDE) 37.5-25 MG per capsule TAKE 1 CAPSULE BY MOUTH EVERY MORNING 90 capsule 3    metFORMIN (GLUCOPHAGE) 500 MG tablet Take 1 tablet by mouth in the morning and 1 tablet in the evening. Take with meals. 180 tablet 3    cloNIDine (CATAPRES) 0.2 MG tablet Take 1 tablet by mouth every evening 90 tablet 3    ascorbic acid (VITAMIN C) 250 MG tablet Take by mouth      aspirin 81 MG EC tablet Take 81 mg by mouth      doxycycline monohydrate (ADOXA) 100 MG tablet Take 100 mg by mouth every 12 hours      hydrALAZINE (APRESOLINE) 100 MG tablet Take 1 tablet by mouth twice a day      loratadine (CLARITIN) 10 MG tablet Take 10 mg by mouth daily      potassium chloride (KLOR-CON M) 20 MEQ extended release tablet Take 1 tablet by mouth once daily BEFORE LUNCH       No current facility-administered medications for this visit. Mr. Fregoso Sher History   Problem Relation Age of Onset    Hypertension Sister     Hypertension Brother     Hypertension Brother     Stroke Mother     Diabetes Mother     Heart Disease Father     Coronary Art Dis Father     Stroke Father     Hypertension Mother     Cancer Father         prostate       Mr. Emmanuel    Social History     Socioeconomic History    Marital status:      Spouse name: Not on file    Number of children: Not on file    Years of education: Not on file    Highest education level: Not on file   Occupational History    Not on file   Tobacco Use    Smoking status: Never    Smokeless tobacco: Never   Substance and Sexual Activity    Alcohol use: No    Drug use: No    Sexual activity: Not on file   Other Topics Concern    Not on file   Social History Narrative    Not on file     Social Determinants of Health     Financial Resource Strain: Not on file   Food Insecurity: Not on file   Transportation Needs: Not on file   Physical Activity: Not on file   Stress: Not on file   Social Connections: Not on file   Intimate Partner Violence: Not on file   Housing Stability: Not on file       Mr. Emmanuel  has the following allergies: Allergies   Allergen Reactions    Macadamia Nut Oil Anaphylaxis     Tongue and throat swelling with almonds. States that he recently noticed symptoms with eating peanuts. Ace Inhibitors Other (See Comments)    Codeine Nausea And Vomiting       /70   Pulse 87   Temp 97.1 °F (36.2 °C)   Resp 16   Ht 5' 11\" (1.803 m)   Wt 226 lb 3.2 oz (102.6 kg)   SpO2 98%   BMI 31.55 kg/m²     HEENT: Normocephalic, atraumatic, pupils equal and reactive to light. Tympanic membranes intact without erythema or edema. Oropharynx clear. Neck: Supple, no masses or thyromegaly. Lungs: clear to auscultation bilaterally. CV: regular rate and rhythm, without murmurs, rubs, or gallops. Abdomen: soft, nontender, nondistended, no masses. No CVAT tenderness. Ext: No lower extremity edema,    Diabetic foot exam:   Left Foot:   Visual Exam: normal   Pulse DP: 2+ (normal)   Filament test: normal sensation   Vibratory Sensation: normal  Right Foot:   Visual Exam: normal   Pulse DP: 2+ (normal)   Filament test: normal sensation   Vibratory Sensation: normal    No results found for this visit on 07/27/22. Lita Felipe was seen today for diabetes. Diagnoses and all orders for this visit:    Type 2 diabetes mellitus without complication, without long-term current use of insulin (HCC)  -     metFORMIN (GLUCOPHAGE) 500 MG tablet; Take 1 tablet by mouth in the morning and 1 tablet in the evening. Take with meals. High cholesterol  -     simvastatin (ZOCOR) 20 MG tablet;  Take 1 tablet by mouth nightly    Essential hypertension  -     valsartan (DIOVAN) 40 MG tablet; Take 1 tablet by mouth in the morning.  -     triamterene-hydroCHLOROthiazide (DYAZIDE) 37.5-25 MG per capsule; TAKE 1 CAPSULE BY MOUTH EVERY MORNING  -     cloNIDine (CATAPRES) 0.2 MG tablet; Take 1 tablet by mouth every evening  Decrease metformin to 1 tablet twice a day. Let me know if his stomach upset does not improve. Follow-up 6 months with labs 1 week prior. Patient counseled on diet and exercise.     Kameron Lindsay MD

## 2022-09-22 ENCOUNTER — OFFICE VISIT (OUTPATIENT)
Dept: CARDIOLOGY CLINIC | Age: 67
End: 2022-09-22
Payer: MEDICARE

## 2022-09-22 VITALS
DIASTOLIC BLOOD PRESSURE: 80 MMHG | BODY MASS INDEX: 32.9 KG/M2 | SYSTOLIC BLOOD PRESSURE: 164 MMHG | HEIGHT: 71 IN | WEIGHT: 235 LBS | HEART RATE: 76 BPM

## 2022-09-22 DIAGNOSIS — I25.10 ATHEROSCLEROSIS OF NATIVE CORONARY ARTERY OF NATIVE HEART WITHOUT ANGINA PECTORIS: ICD-10-CM

## 2022-09-22 DIAGNOSIS — R00.2 PALPITATION: ICD-10-CM

## 2022-09-22 DIAGNOSIS — G47.33 OSA (OBSTRUCTIVE SLEEP APNEA): ICD-10-CM

## 2022-09-22 DIAGNOSIS — I10 ESSENTIAL HYPERTENSION: ICD-10-CM

## 2022-09-22 DIAGNOSIS — I10 ESSENTIAL (PRIMARY) HYPERTENSION: Primary | ICD-10-CM

## 2022-09-22 DIAGNOSIS — Z86.16 HISTORY OF COVID-19: ICD-10-CM

## 2022-09-22 PROCEDURE — 1036F TOBACCO NON-USER: CPT | Performed by: INTERNAL MEDICINE

## 2022-09-22 PROCEDURE — G8417 CALC BMI ABV UP PARAM F/U: HCPCS | Performed by: INTERNAL MEDICINE

## 2022-09-22 PROCEDURE — 99214 OFFICE O/P EST MOD 30 MIN: CPT | Performed by: INTERNAL MEDICINE

## 2022-09-22 PROCEDURE — 3017F COLORECTAL CA SCREEN DOC REV: CPT | Performed by: INTERNAL MEDICINE

## 2022-09-22 PROCEDURE — 93000 ELECTROCARDIOGRAM COMPLETE: CPT | Performed by: INTERNAL MEDICINE

## 2022-09-22 PROCEDURE — 1123F ACP DISCUSS/DSCN MKR DOCD: CPT | Performed by: INTERNAL MEDICINE

## 2022-09-22 PROCEDURE — G8427 DOCREV CUR MEDS BY ELIG CLIN: HCPCS | Performed by: INTERNAL MEDICINE

## 2022-09-22 RX ORDER — SPIRONOLACTONE 25 MG/1
25 TABLET ORAL DAILY
Qty: 90 TABLET | Refills: 1 | Status: SHIPPED | OUTPATIENT
Start: 2022-09-22

## 2022-09-22 NOTE — PROGRESS NOTES
[address] [facility], [address]  PHONE: [phone]     22    NAME:  Sunni Miles. : 1955  MRN: [id number]       SUBJECTIVE:   Sunni Miles. is a 75 y.o. male seen for a follow up visit regarding the following:     Chief Complaint   Patient presents with    Hypertension       HPI: Here for CAD. CAD (mild CAD  [facility])   HTN (Ace cough, did not tolerate norvasc or diovan). AMY on CPAP now since .   NST 2019: low risk, no ischemia     BP variable now, has struggled on meds. No new angina, CP, PALACIOS, SOB. Can get sleepy after meds. Irreg heart rhythm on BP mointor. Patient denies recent history of orthopnea, PND, excessive dizziness and/or syncope. Followed for bladder CA, no issues, followed yearly now. Past Medical History, Past Surgical History, Family history, Social History, and Medications were all reviewed with the patient today and updated as necessary. Current Outpatient Medications   Medication Sig Dispense Refill    Multiple Vitamin (MULTIVITAMIN ADULT PO) Take by mouth      Probiotic Product (PROBIOTIC-10 PO) Take by mouth      spironolactone (ALDACTONE) 25 MG tablet Take 1 tablet by mouth daily 90 tablet 1    simvastatin (ZOCOR) 20 MG tablet Take 1 tablet by mouth nightly 90 tablet 3    valsartan (DIOVAN) 40 MG tablet Take 1 tablet by mouth in the morning. 90 tablet 3    triamterene-hydroCHLOROthiazide (DYAZIDE) 37.5-25 MG per capsule TAKE 1 CAPSULE BY MOUTH EVERY MORNING 90 capsule 3    metFORMIN (GLUCOPHAGE) 500 MG tablet Take 1 tablet by mouth in the morning and 1 tablet in the evening. Take with meals.  180 tablet 3    cloNIDine (CATAPRES) 0.2 MG tablet Take 1 tablet by mouth every evening 90 tablet 3    ascorbic acid (VITAMIN C) 250 MG tablet Take by mouth      aspirin 81 MG EC tablet Take 81 mg by mouth      hydrALAZINE (APRESOLINE) 100 MG tablet Take 1 tablet by mouth twice a day loratadine (CLARITIN) 10 MG tablet Take 10 mg by mouth daily      potassium chloride (KLOR-CON M) 20 MEQ extended release tablet Take 1 tablet by mouth once daily BEFORE LUNCH      doxycycline monohydrate (ADOXA) 100 MG tablet Take 100 mg by mouth every 12 hours       No current facility-administered medications for this visit. Allergies   Allergen Reactions    Macadamia Nut Oil Anaphylaxis     Tongue and throat swelling with almonds. States that he recently noticed symptoms with eating peanuts.        Ace Inhibitors Other (See Comments)    Codeine Nausea And Vomiting     Patient Active Problem List    Diagnosis Date Noted    Palpitation 09/22/2022     Priority: Medium    Status post amputation of finger of right hand 07/27/2022     Middle finger due to a cyst      History of COVID-19 04/19/2022    Hypersomnia 12/06/2017    Obesity (BMI 30.0-34.9) 12/06/2017    AMY (obstructive sleep apnea) 12/06/2017    Coronary atherosclerosis of native coronary vessel 03/28/2016    Malignant neoplasm of dome of urinary bladder ([facility] 06/27/2014    Microscopic hematuria 02/27/2014    Essential hypertension 02/27/2014     Sees Dr. Mindy Layton        Retention of urine, unspecified 02/27/2014    Calculus of kidney 02/27/2014    Urinary frequency 02/27/2014    BPH with obstruction/lower urinary tract symptoms 02/27/2014    Arthritis 02/07/2013    Colon polyps 02/07/2013    High cholesterol 02/07/2013    Diabetes mellitus ([facility] 02/07/2013      Past Surgical History:   Procedure Laterality Date    AMPUTATION Right     third finger due to a tumor in finger    CLOSE CYSTOSTOMY  9/2012    several cysto w/ bladder biopsy; was cancer    CLOSE CYSTOSTOMY  07/2021    removed several bladder stones    COLONOSCOPY  2013    COLONOSCOPY N/A 2/14/2018    COLONOSCOPY / BMI=34 performed by Belinda Bee MD at [address] Bilateral 2000's    brian toe surgery (great toe), removal of arthritis from toes    TUMOR REMOVAL 1974    scalp     Family History   Problem Relation Age of Onset    Hypertension Sister     Hypertension Brother     Hypertension Brother     Stroke Mother     Diabetes Mother     Heart Disease Father     Coronary Art Dis Father     Stroke Father     Hypertension Mother     Cancer Father         prostate     Social History     Tobacco Use    Smoking status: Never    Smokeless tobacco: Never   Substance Use Topics    Alcohol use: No         ROS:    No obvious pertinent positives on review of systems except for what was outlined in the HPI today. PHYSICAL EXAM:     BP (!) 164/80   Pulse 76   Ht 5' 11\" (1.803 m)   Wt 235 lb (106.6 kg)   BMI 32.78 kg/m²    General/Constitutional:   Alert and oriented x 3, no acute distress  HEENT:   normocephalic, atraumatic, moist mucous membranes  Neck:   No JVD or carotid bruits bilaterally  Cardiovascular:   regular rate and rhythm, no murmur/rub/gallop appreciated  Pulmonary:   clear to auscultation bilaterally, no respiratory distress  Abdomen:   soft, non-tender, non-distended  Ext:   No sig LE edema bilaterally  Skin:  warm and dry, no obvious rashes seen  Neuro:   no obvious sensory or motor deficits  Psychiatric:   normal mood and affect      EKG Today and independently reviewed by me: sinus rhythm, normal intervals and non-specific ST/T wave changes.         Lab Results   Component Value Date/Time     04/14/2022 10:12 AM    K 4.1 04/14/2022 10:12 AM     04/14/2022 10:12 AM    CO2 24 04/14/2022 10:12 AM    BUN 13 04/14/2022 10:12 AM    CREATININE 0.87 04/14/2022 10:12 AM    GLUCOSE 157 04/14/2022 10:12 AM    CALCIUM 9.6 04/14/2022 10:12 AM        Lab Results   Component Value Date    WBC 5.4 07/01/2021    HGB 13.2 (L) 07/01/2021    HCT 42.6 07/01/2021    MCV 84.9 07/01/2021     (L) 07/01/2021       No results found for: TSHFT4, TSH    Lab Results   Component Value Date    LABA1C 7.0 (H) 07/19/2022     Lab Results   Component Value Date     07/19/2022       Lab Results   Component Value Date    CHOL 173 04/14/2022    CHOL 162 10/13/2021    CHOL 143 04/13/2021     Lab Results   Component Value Date    TRIG 158 (H) 04/14/2022    TRIG 108 10/13/2021    TRIG 131 04/13/2021     Lab Results   Component Value Date    HDL 51 04/14/2022    HDL 46 10/13/2021    HDL 49 04/13/2021     Lab Results   Component Value Date    LDLCALC 95 04/14/2022    LDLCALC 96 10/13/2021    LDLCALC 71 04/13/2021     Lab Results   Component Value Date    LABVLDL 30 07/10/2020    LABVLDL 21 01/27/2020    VLDL 27 04/14/2022    VLDL 20 10/13/2021    VLDL 23 04/13/2021     No results found for: CHOLNELLY        I have Independently reviewed prior care notes, any ER records available, cardiac testing, labs and results with the patient and before seeing the patient today. Also independently reviewed outside records when available. ASSESSMENT:    Cristina Dill was seen today for hypertension. Diagnoses and all orders for this visit:    Essential (primary) hypertension  -     EKG 12 Lead  -     Comprehensive Metabolic Panel; Future  -     TSH; Future  -     Magnesium; Future    Atherosclerosis of native coronary artery of native heart without angina pectoris  -     Transthoracic echocardiogram (TTE) complete with contrast, bubble, strain, and 3D PRN; Future    Essential hypertension  -     Transthoracic echocardiogram (TTE) complete with contrast, bubble, strain, and 3D PRN; Future    AMY (obstructive sleep apnea)    History of COVID-19    Palpitation  -     Cardiac event monitor; Future    Other orders  -     spironolactone (ALDACTONE) 25 MG tablet; Take 1 tablet by mouth daily        PLAN:    1. HTN:   higher now, variable now. On low salt diet. Check echo. On CPAP. Afib?? -----  check tele monitor. Add aldactone in AM. Stay off clonidine, labs in 3 weeks. Follow K and Cr. Hydralazine 100 BID  Valsartan 40 at night        2. HPL:   On zocor. Diet better now. 3. CAD: working on diabetes, better now. Diet and exercise better. ASA and statin. The patient has been instructed to call with any angina or equivalent as reviewed today. All questions were answered with the patient voicing complete understanding. 4. AMY: remain on CPAP. Patient has been instructed and agrees to call our office with any issues or other concerns related to their cardiac condition(s) and/or complaint(s). Return in about 1 month (around 10/22/2022).        TK BEGUM,   9/22/2022

## 2022-09-29 ENCOUNTER — TELEPHONE (OUTPATIENT)
Dept: CARDIOLOGY CLINIC | Age: 67
End: 2022-09-29

## 2022-09-30 NOTE — TELEPHONE ENCOUNTER
Called pt to verify monitor is connected. Pt states that its connected but there was a point was having difficulty having monitor connect to phone but fixed now.

## 2022-10-10 ENCOUNTER — NURSE ONLY (OUTPATIENT)
Dept: UROLOGY | Age: 67
End: 2022-10-10

## 2022-10-10 DIAGNOSIS — I10 ESSENTIAL (PRIMARY) HYPERTENSION: ICD-10-CM

## 2022-10-10 DIAGNOSIS — R97.20 ELEVATED PROSTATE SPECIFIC ANTIGEN (PSA): Primary | ICD-10-CM

## 2022-10-10 LAB
ALBUMIN SERPL-MCNC: 4 G/DL (ref 3.2–4.6)
ALBUMIN/GLOB SERPL: 1.3 {RATIO} (ref 1.2–3.5)
ALP SERPL-CCNC: 72 U/L (ref 50–136)
ALT SERPL-CCNC: 26 U/L (ref 12–65)
ANION GAP SERPL CALC-SCNC: 5 MMOL/L (ref 4–13)
AST SERPL-CCNC: 20 U/L (ref 15–37)
BILIRUB SERPL-MCNC: 0.7 MG/DL (ref 0.2–1.1)
BUN SERPL-MCNC: 17 MG/DL (ref 8–23)
CALCIUM SERPL-MCNC: 9.1 MG/DL (ref 8.3–10.4)
CHLORIDE SERPL-SCNC: 109 MMOL/L (ref 101–110)
CO2 SERPL-SCNC: 29 MMOL/L (ref 21–32)
CREAT SERPL-MCNC: 1 MG/DL (ref 0.8–1.5)
GLOBULIN SER CALC-MCNC: 3.1 G/DL (ref 2.3–3.5)
GLUCOSE SERPL-MCNC: 133 MG/DL (ref 65–100)
MAGNESIUM SERPL-MCNC: 2 MG/DL (ref 1.8–2.4)
POTASSIUM SERPL-SCNC: 3.8 MMOL/L (ref 3.5–5.1)
PROT SERPL-MCNC: 7.1 G/DL (ref 6.3–8.2)
PSA SERPL-MCNC: 3.3 NG/ML
SODIUM SERPL-SCNC: 143 MMOL/L (ref 138–145)
TSH, 3RD GENERATION: 0.68 UIU/ML (ref 0.36–3.74)

## 2022-10-10 RX ORDER — HYDRALAZINE HYDROCHLORIDE 100 MG/1
TABLET, FILM COATED ORAL
Qty: 180 TABLET | Refills: 1 | Status: SHIPPED | OUTPATIENT
Start: 2022-10-10

## 2022-10-19 ENCOUNTER — OFFICE VISIT (OUTPATIENT)
Dept: CARDIOLOGY CLINIC | Age: 67
End: 2022-10-19
Payer: MEDICARE

## 2022-10-19 VITALS
HEART RATE: 76 BPM | BODY MASS INDEX: 33.04 KG/M2 | HEIGHT: 71 IN | SYSTOLIC BLOOD PRESSURE: 172 MMHG | WEIGHT: 236 LBS | DIASTOLIC BLOOD PRESSURE: 90 MMHG

## 2022-10-19 DIAGNOSIS — I25.10 ATHEROSCLEROSIS OF NATIVE CORONARY ARTERY OF NATIVE HEART WITHOUT ANGINA PECTORIS: Primary | ICD-10-CM

## 2022-10-19 DIAGNOSIS — R00.2 PALPITATION: ICD-10-CM

## 2022-10-19 DIAGNOSIS — I10 ESSENTIAL HYPERTENSION: ICD-10-CM

## 2022-10-19 PROCEDURE — G8484 FLU IMMUNIZE NO ADMIN: HCPCS | Performed by: INTERNAL MEDICINE

## 2022-10-19 PROCEDURE — 3017F COLORECTAL CA SCREEN DOC REV: CPT | Performed by: INTERNAL MEDICINE

## 2022-10-19 PROCEDURE — 1123F ACP DISCUSS/DSCN MKR DOCD: CPT | Performed by: INTERNAL MEDICINE

## 2022-10-19 PROCEDURE — G8417 CALC BMI ABV UP PARAM F/U: HCPCS | Performed by: INTERNAL MEDICINE

## 2022-10-19 PROCEDURE — 1036F TOBACCO NON-USER: CPT | Performed by: INTERNAL MEDICINE

## 2022-10-19 PROCEDURE — 99214 OFFICE O/P EST MOD 30 MIN: CPT | Performed by: INTERNAL MEDICINE

## 2022-10-19 PROCEDURE — G8427 DOCREV CUR MEDS BY ELIG CLIN: HCPCS | Performed by: INTERNAL MEDICINE

## 2022-10-19 RX ORDER — AMLODIPINE BESYLATE 10 MG/1
10 TABLET ORAL DAILY
Qty: 90 TABLET | Refills: 1 | Status: SHIPPED | OUTPATIENT
Start: 2022-10-19

## 2022-10-19 NOTE — PROGRESS NOTES
[facility] [facility], [address]  PHONE: [phone]     10/19/22    NAME:  Kailee Wilkinson. : 1955  MRN: [id number]       SUBJECTIVE:   Kailee Wilkinson. is a 75 y.o. male seen for a follow up visit regarding the following:     Chief Complaint   Patient presents with    Coronary Artery Disease    Hypertension    Results     Echo, lab, and monitor       HPI:  Here for CAD. CAD (mild CAD  [facility])   HTN (Ace cough, did not tolerate norvasc or diovan). AMY on CPAP now since .   NST 2019: low risk, no ischemia  Echo 10/2022: normal EF, LVH     BP variable now, has struggled on meds. No new angina, CP, PALACIOS, SOB, BP remains higher. Patient denies recent history of orthopnea, PND, excessive dizziness and/or syncope. Followed for bladder CA, no issues, followed yearly now. Past Medical History, Past Surgical History, Family history, Social History, and Medications were all reviewed with the patient today and updated as necessary. Current Outpatient Medications   Medication Sig Dispense Refill    amLODIPine (NORVASC) 10 MG tablet Take 1 tablet by mouth daily 90 tablet 1    hydrALAZINE (APRESOLINE) 100 MG tablet TAKE 1 TABLET BY MOUTH TWICE DAILY 180 tablet 1    Multiple Vitamin (MULTIVITAMIN ADULT PO) Take by mouth      Probiotic Product (PROBIOTIC-10 PO) Take by mouth      spironolactone (ALDACTONE) 25 MG tablet Take 1 tablet by mouth daily 90 tablet 1    simvastatin (ZOCOR) 20 MG tablet Take 1 tablet by mouth nightly 90 tablet 3    valsartan (DIOVAN) 40 MG tablet Take 1 tablet by mouth in the morning. 90 tablet 3    metFORMIN (GLUCOPHAGE) 500 MG tablet Take 1 tablet by mouth in the morning and 1 tablet in the evening. Take with meals.  180 tablet 3    ascorbic acid (VITAMIN C) 250 MG tablet Take by mouth      aspirin 81 MG EC tablet Take 81 mg by mouth      loratadine (CLARITIN) 10 MG tablet Take 10 mg by mouth daily      potassium chloride (KLOR-CON M) 20 MEQ extended release tablet Take 1 tablet by mouth once daily BEFORE LUNCH       No current facility-administered medications for this visit. Allergies   Allergen Reactions    Macadamia Nut Oil Anaphylaxis     Tongue and throat swelling with almonds. States that he recently noticed symptoms with eating peanuts.        Ace Inhibitors Other (See Comments)    Codeine Nausea And Vomiting     Patient Active Problem List    Diagnosis Date Noted    Palpitation 09/22/2022     Priority: Medium    Status post amputation of finger of right hand 07/27/2022     Middle finger due to a cyst      History of COVID-19 04/19/2022    Hypersomnia 12/06/2017    Obesity (BMI 30.0-34.9) 12/06/2017    AMY (obstructive sleep apnea) 12/06/2017    Coronary atherosclerosis of native coronary vessel 03/28/2016    Malignant neoplasm of dome of urinary bladder ([facility] 06/27/2014    Microscopic hematuria 02/27/2014    Essential hypertension 02/27/2014     Sees Dr. Chon Larose        Retention of urine, unspecified 02/27/2014    Calculus of kidney 02/27/2014    Urinary frequency 02/27/2014    BPH with obstruction/lower urinary tract symptoms 02/27/2014    Arthritis 02/07/2013    Colon polyps 02/07/2013    High cholesterol 02/07/2013    Diabetes mellitus ([facility] 02/07/2013      Past Surgical History:   Procedure Laterality Date    AMPUTATION Right     third finger due to a tumor in finger    CLOSE CYSTOSTOMY  9/2012    several cysto w/ bladder biopsy; was cancer    CLOSE CYSTOSTOMY  07/2021    removed several bladder stones    COLONOSCOPY  2013    COLONOSCOPY N/A 2/14/2018    COLONOSCOPY / BMI=34 performed by Ирина Diallo MD at [facility] ENDOSCOPY    ORTHOPEDIC SURGERY Bilateral 2000's    brian toe surgery (great toe), removal of arthritis from toes    TUMOR REMOVAL  1974    scalp     Family History   Problem Relation Age of Onset    Hypertension Sister     Hypertension Brother     Hypertension Brother     Stroke Mother Diabetes Mother     Heart Disease Father     Coronary Art Dis Father     Stroke Father     Hypertension Mother     Cancer Father         prostate     Social History     Tobacco Use    Smoking status: Never    Smokeless tobacco: Never   Substance Use Topics    Alcohol use: No         ROS:    No obvious pertinent positives on review of systems except for what was outlined in the HPI today.       PHYSICAL EXAM:     BP (!) 172/90   Pulse 76   Ht 5' 11\" (1.803 m)   Wt 236 lb (107 kg)   BMI 32.92 kg/m²    General/Constitutional:   Alert and oriented x 3, no acute distress  HEENT:   normocephalic, atraumatic, moist mucous membranes  Neck:   No JVD or carotid bruits bilaterally  Cardiovascular:   regular rate and rhythm, no murmur/rub/gallop appreciated  Pulmonary:   clear to auscultation bilaterally, no respiratory distress  Abdomen:   soft, non-tender, non-distended  Ext:   No sig LE edema bilaterally  Skin:  warm and dry, no obvious rashes seen  Neuro:   no obvious sensory or motor deficits  Psychiatric:   normal mood and affect      Lab Results   Component Value Date/Time     10/10/2022 10:16 AM    K 3.8 10/10/2022 10:16 AM     10/10/2022 10:16 AM    CO2 29 10/10/2022 10:16 AM    BUN 17 10/10/2022 10:16 AM    CREATININE 1.00 10/10/2022 10:16 AM    GLUCOSE 133 10/10/2022 10:16 AM    CALCIUM 9.1 10/10/2022 10:16 AM        Lab Results   Component Value Date    WBC 5.4 07/01/2021    HGB 13.2 (L) 07/01/2021    HCT 42.6 07/01/2021    MCV 84.9 07/01/2021     (L) 07/01/2021       No results found for: TSHFT4, TSH    Lab Results   Component Value Date    LABA1C 7.0 (H) 07/19/2022     Lab Results   Component Value Date     07/19/2022       Lab Results   Component Value Date    CHOL 173 04/14/2022    CHOL 162 10/13/2021    CHOL 143 04/13/2021     Lab Results   Component Value Date    TRIG 158 (H) 04/14/2022    TRIG 108 10/13/2021    TRIG 131 04/13/2021     Lab Results   Component Value Date    HDL 51 04/14/2022    HDL 46 10/13/2021    HDL 49 04/13/2021     Lab Results   Component Value Date    LDLCALC 95 04/14/2022    LDLCALC 96 10/13/2021    LDLCALC 71 04/13/2021     Lab Results   Component Value Date    LABVLDL 30 07/10/2020    LABVLDL 21 01/27/2020    VLDL 27 04/14/2022    VLDL 20 10/13/2021    VLDL 23 04/13/2021     No results found for: CHOLNELLY        I have Independently reviewed prior care notes, any ER records available, cardiac testing, labs and results with the patient and before seeing the patient today. Also independently reviewed outside records when available. ASSESSMENT:    Luba Mendoza was seen today for coronary artery disease, hypertension and results. Diagnoses and all orders for this visit:    Atherosclerosis of native coronary artery of native heart without angina pectoris    Essential hypertension    Palpitation    Other orders  -     amLODIPine (NORVASC) 10 MG tablet; Take 1 tablet by mouth daily        PLAN:    1. HTN:   higher now, variable now. On low salt diet. On hyd 100 BID, diorvan, aldactone. Felt tired on clonidine. Add norvasc 10.           2. HPL:   On zocor. Diet better now. 3. CAD: working on diabetes, better now. Diet and exercise better. ASA and statin. The patient has been instructed to call with any angina or equivalent as reviewed today. All questions were answered with the patient voicing complete understanding. 4. AMY: remain on CPAP. Patient has been instructed and agrees to call our office with any issues or other concerns related to their cardiac condition(s) and/or complaint(s). Return in about 2 months (around 12/19/2022).        TK BEGUM DO  10/19/2022

## 2022-10-21 ENCOUNTER — TELEPHONE (OUTPATIENT)
Dept: CARDIOLOGY CLINIC | Age: 67
End: 2022-10-21

## 2022-10-21 NOTE — TELEPHONE ENCOUNTER
----- Message from Eitan Singleton DO sent at 10/20/2022 10:50 PM EDT -----  Please call the patient. ECHO was ok, nothing major or concerning. If having more angina (worsening PALACIOS, CP, SOB), please let us know. Will review more at follow up appointment and get plan for the future.     Thanks,   Kathy Nathan

## 2022-10-26 ENCOUNTER — NURSE ONLY (OUTPATIENT)
Dept: FAMILY MEDICINE CLINIC | Facility: CLINIC | Age: 67
End: 2022-10-26
Payer: MEDICARE

## 2022-10-26 DIAGNOSIS — Z23 NEED FOR IMMUNIZATION AGAINST INFLUENZA: Primary | ICD-10-CM

## 2022-10-26 PROCEDURE — 90694 VACC AIIV4 NO PRSRV 0.5ML IM: CPT | Performed by: FAMILY MEDICINE

## 2022-10-26 PROCEDURE — G0008 ADMIN INFLUENZA VIRUS VAC: HCPCS | Performed by: FAMILY MEDICINE

## 2022-11-04 ENCOUNTER — PROCEDURE VISIT (OUTPATIENT)
Dept: UROLOGY | Age: 67
End: 2022-11-04
Payer: COMMERCIAL

## 2022-11-04 ENCOUNTER — TELEPHONE (OUTPATIENT)
Dept: UROLOGY | Age: 67
End: 2022-11-04

## 2022-11-04 DIAGNOSIS — C67.1 MALIGNANT NEOPLASM OF DOME OF URINARY BLADDER (HCC): ICD-10-CM

## 2022-11-04 DIAGNOSIS — R97.20 ELEVATED PROSTATE SPECIFIC ANTIGEN (PSA): ICD-10-CM

## 2022-11-04 DIAGNOSIS — N40.1 BENIGN PROSTATIC HYPERPLASIA WITH URINARY OBSTRUCTION: ICD-10-CM

## 2022-11-04 DIAGNOSIS — N21.0 CALCULUS IN BLADDER: ICD-10-CM

## 2022-11-04 DIAGNOSIS — C67.9 MALIGNANT NEOPLASM OF URINARY BLADDER, UNSPECIFIED SITE (HCC): Primary | ICD-10-CM

## 2022-11-04 DIAGNOSIS — N13.8 BENIGN PROSTATIC HYPERPLASIA WITH URINARY OBSTRUCTION: ICD-10-CM

## 2022-11-04 LAB
BILIRUBIN, URINE, POC: NEGATIVE
BLOOD URINE, POC: NEGATIVE
GLUCOSE URINE, POC: 250
KETONES, URINE, POC: NORMAL
LEUKOCYTE ESTERASE, URINE, POC: NEGATIVE
NITRITE, URINE, POC: NEGATIVE
PH, URINE, POC: 5.5 (ref 4.6–8)
PROTEIN,URINE, POC: 30
SPECIFIC GRAVITY, URINE, POC: 1.03 (ref 1–1.03)
URINALYSIS CLARITY, POC: NORMAL
URINALYSIS COLOR, POC: NORMAL
UROBILINOGEN, POC: NORMAL

## 2022-11-04 PROCEDURE — 52000 CYSTOURETHROSCOPY: CPT | Performed by: UROLOGY

## 2022-11-04 PROCEDURE — 81003 URINALYSIS AUTO W/O SCOPE: CPT | Performed by: UROLOGY

## 2022-11-04 NOTE — PROGRESS NOTES
[facility]  [address], [address] [address]. : 1955         HPI   75 y.o., male With hx of bladder cancer, elevated PSA. S/p TURBT 10-8-12, for stage T1 bladder cancer. Had a papillary tumor at the right dome and right lateral wall. Pathology shows papillary high-grade urothelial carcinoma with   focal superficial lamina propria invasion from the bladder dome   tumor. Muscularis propria was not identified. We also did a separate biopsy of the bladder dome adjacent to the   tumor. It shows high grade urothelial carcinoma with associated   superficial lamina propria invasion, muscularis propria not   identified. The right lateral wall tumor shows papillary high-grade   urothelial carcinoma, definitive features of invasion are not   identified, muscularis propria is not identified. We did a re-biopsy of the resection base. There is focally   necrotic tissue present, no carcinoma noted. Completed 6 week induction course of BCG in . Follow up cysto showed tumor on left dome, flat red area right   dome. S/P TURBT on 13. Papillary tumor left dome shows high grade   papillary tumor, no invasion. Right dome flat area shows focal   features of high grade urothelial carcinoma, Separate fragment of dysplastic urothelium. Small fragment of muscularis present,   uninvolved by tumor. Cystoscopy in 2013 shows BPH, 4+ trabeculation, no tumors. Atypical cytology with negative fish test.--S/P Cystoscopy with   bladder biopsy on 10/17/13. Pathology revealed urothelial atypia. Has fnished 6 weeks of BCG at end of . Continues on Flomax. Completed  BCG maintenance. Cytology in  showed atypical cells, but negative FISH. Had 3   week BCG in . Negative cystoscopy in 2014. Cytology showed atypical   Cells. Cysto in 4-15 showed flat red area right bladder . Negative FISH.   Cystoscopy in July 2015 showed that the red flat area was   smaller. Cytology showed atypical cells. Finished BCG 3 in September 2015, and 4-16. Finished last BCG 10-28-16.negative cytology in 5-16. Cysto showed red edematous area right anterior bladder. Had MRI to evaluate the bladder mass on 12-17-16: Impression:   1. Small sessile mass in the right anterolateral urinary bladder wall  consistent with patient's known primary urothelial carcinoma, suspect this is a  T3 lesion. 2. No evidence of local perivesicular or pelvic metastatic disease, MR radha staging N0.  3. Other incidental findings such small urachal diverticulum as above. Had TUR of the bladder lesion of 12-22-16. Path:   DIAGNOSIS   BLADDER LESION: TISSUE CONSISTENT WITH BLADDER MUCOSA HAVING EDEMA AND CHRONIC INFLAMMATION OF LAMINA PROPRIA WITHOUT SIGNIFICANT LINING MUCOSA     Cytology in 4-17 showed rare atypical cells. PSA 1.8 in 2017. Cysto in 5-18 showed diverticulum at dome with surrounding cellulitis. Had pelvic MRI in 5-18: IMPRESSION:   1. The previously seen sessile lesion well evident at the right bladder is  significantly decreased and now at the right bladder wall there is only some  nonfocal mild bladder wall thickening which does not enhance-appearance at this  is clearly improved. 2. No evidence for metastatic disease in the pelvis. 3. Stable chronic changes including a small bladder diverticulum at the anterior  dome and prostate enlargement secondary to BPH again seen-these seem stable. He has started having more urgency since starting a diuretic. Started Tamsulosin in 2018. He stopped it, no voiding complaints now. Cysto in 12-19 showed small bladder stones. PSA 4.4 in 4-20. He has no voiding complaints. Cysto in 5-20 showed bladder stones were larger, no tumors. MRI 5-28-20: Findings:  PROSTATE SIZE: The gland measures 6.9 x 4.6 x 4.5 cm.      PERIPHERAL GLAND: Few patchy areas of decreased T2 signal throughout the  peripheral zone right greater than left without focal nodule, decreased ADC  signal, or early arterial enhancement. CENTRAL GLAND: There are T2 hyperintense nodules in the central gland consistent  with hypertrophy. CAPSULE: The prostatic capsule is maintained without evidence of metastatic  spread. PERINEURAL: The neurovascular bundles and posterolateral periprostatic fat are  normal without evidence of tumor extension. SEMINAL VESICLES: The seminal vesicles and ejaculatory ducts are normal.      LYMPH NODES: No pelvic lymph adenopathy. Limited imaging of the lower abdomen  shows no adenopathy or free fluid. BONES: No aggressive osseous lesion. BLADDER AND RECTUM: There is a small diverticulum at the bladder dome best  visualized on the coronal data set series 7 image 7. The bladder is otherwise  unremarkable. Rectum unremarkable. IMPRESSION  IMPRESSION:  1. No discernible lesion seen. No evidence of disease spread beyond the  prostatic capsule. 2. No lymphadenopathy. May need cysto and removal of bladder stones, possible MRI fusion biopsy. PSA 2.9 in 12-20. He has no hematuria or difficulty voiding . PSA 2.8 in 6-21. Cysto in 7-21 showed BPH,  Numerous bladder stones, 10-15, largest 1 cm. on 7-1-21 had cystolitholapaxy. bladder stones, struvite on analysis recommended that he acidify urine with Vit C 500 mg po   PSA 3.3 in 10-22.      Past Medical History:   Diagnosis Date    Arthritis     back-     Bladder cancer ([address]     Chronic pain     back--pain comes and goes    Colon polyps 2/7/2013    Coronary atherosclerosis of native coronary vessel 03/28/2016    no mi/ no stents---- followed by dr Joselito Bennett    COVID-19 vaccine series completed 03/29/2021    moderna    DDD (degenerative disc disease) 02/07/2013    lower back    Diabetes ([address]     pre-diabetic    GERD (gastroesophageal reflux disease)     occ med    History of kidney stones     x 1, passed on own Hypertension     controlled by medication    Hypertrophy of prostate with urinary obstruction and other lower urinary tract symptoms (LUTS) 2/27/2014    Hypertrophy of prostate with urinary obstruction and other lower urinary tract symptoms (LUTS)     Malignant neoplasm of bladder, part unspecified dx- summer 2012    surg/ BCG tx---- followed by dr Karley Smith    Morbid obesity (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    CLOSE CYSTOSTOMY  9/2012    several cysto w/ bladder biopsy; was cancer    CLOSE CYSTOSTOMY  07/2021    removed several bladder stones    COLONOSCOPY  2013    COLONOSCOPY N/A 2/14/2018    COLONOSCOPY / BMI=34 performed by Mell Banks MD at [address] [facility] Bilateral 2000's    brian toe surgery (great toe), removal of arthritis from toes    TUMOR REMOVAL  1974    scalp     Current Outpatient Medications   Medication Sig Dispense Refill    amLODIPine (NORVASC) 10 MG tablet Take 1 tablet by mouth daily 90 tablet 1    hydrALAZINE (APRESOLINE) 100 MG tablet TAKE 1 TABLET BY MOUTH TWICE DAILY 180 tablet 1    Multiple Vitamin (MULTIVITAMIN ADULT PO) Take by mouth      Probiotic Product (PROBIOTIC-10 PO) Take by mouth      spironolactone (ALDACTONE) 25 MG tablet Take 1 tablet by mouth daily 90 tablet 1    simvastatin (ZOCOR) 20 MG tablet Take 1 tablet by mouth nightly 90 tablet 3    valsartan (DIOVAN) 40 MG tablet Take 1 tablet by mouth in the morning. 90 tablet 3    metFORMIN (GLUCOPHAGE) 500 MG tablet Take 1 tablet by mouth in the morning and 1 tablet in the evening. Take with meals.  180 tablet 3    ascorbic acid (VITAMIN C) 250 MG tablet Take by mouth      aspirin 81 MG EC tablet Take 81 mg by mouth loratadine (CLARITIN) 10 MG tablet Take 10 mg by mouth daily      potassium chloride (KLOR-CON M) 20 MEQ extended release tablet Take 1 tablet by mouth once daily BEFORE LUNCH       No current facility-administered medications for this visit. Allergies   Allergen Reactions    Macadamia Nut Oil Anaphylaxis     Tongue and throat swelling with almonds. States that he recently noticed symptoms with eating peanuts. Ace Inhibitors Other (See Comments)    Codeine Nausea And Vomiting     Social History     Socioeconomic History    Marital status:      Spouse name: Not on file    Number of children: Not on file    Years of education: Not on file    Highest education level: Not on file   Occupational History    Not on file   Tobacco Use    Smoking status: Never    Smokeless tobacco: Never   Substance and Sexual Activity    Alcohol use: No    Drug use: No    Sexual activity: Not on file   Other Topics Concern    Not on file   Social History Narrative    Not on file     Social Determinants of Health     Financial Resource Strain: Not on file   Food Insecurity: Not on file   Transportation Needs: Not on file   Physical Activity: Not on file   Stress: Not on file   Social Connections: Not on file   Intimate Partner Violence: Not on file   Housing Stability: Not on file     Family History   Problem Relation Age of Onset    Hypertension Sister     Hypertension Brother     Hypertension Brother     Stroke Mother     Diabetes Mother     Heart Disease Father     Coronary Art Dis Father     Stroke Father     Hypertension Mother     Cancer Father         prostate       There were no vitals taken for this visit. Physical Exam  General   Mental Status - Patient is alert and oriented X3. Build & Nutrition - Well nourished.       Chest and Lung Exam   Chest and lung exam reveals  - normal excursion with symmetric chest walls, quiet, even and easy respiratory effort with no use of accessory muscles and on auscultation, normal breath sounds, no adventitious sounds and normal vocal resonance. Cardiovascular   Cardiovascular examination reveals  - normal heart sounds, regular rate and rhythm with no murmurs. Abdomen   Palpation/Percussion: Palpation and Percussion of the abdomen reveal - Non Tender, No Rebound tenderness, No Rigidity (guarding), No hepatosplenomegaly, No Palpable abdominal masses and Soft. Hernia - Bilateral - No Hernia(s) present. UA - Dipstick  Results for orders placed or performed in visit on 11/04/22   AMB POC URINALYSIS DIP STICK AUTO W/O MICRO   Result Value Ref Range    Color (UA POC)      Clarity (UA POC)      Glucose, Urine,  Negative    Bilirubin, Urine, POC Negative Negative    KETONES, Urine, POC Trace Negative    Specific Gravity, Urine, POC 1.030 1.001 - 1.035    Blood (UA POC) Negative Negative    pH, Urine, POC 5.5 4.6 - 8.0    Protein, Urine, POC 30 Negative    Urobilinogen, POC 0.2 mg/dL     Nitrite, Urine, POC Negative Negative    Leukocyte Esterase, Urine, POC Negative Negative       UA - Micro  WBC - 0  RBC - 0  Bacteria - 0  Epith - 0          Cystoscopy Procedure:     Procedure Room # 3  Scope ID: [id number]  Assistant: lee ann    Cystoscopy Procedure Note    Procedure Details   The risks, benefits, complications, treatment options, and expected outcomes were discussed with the patient. The patient concurred with the proposed plan, giving informed consent. Cystoscopy was performed today under local anesthesia, using sterile technique. The patient was placed in the lithotomy position, prepped with Betadine, and draped in the usual sterile fashion. A  flexiblecystoscope was used to inspect both the urethra and bladder using the 30 and 70 degree lenses. Findings:  Anterior urethra: normal without strictures. Prostatic urethra:  large bilobar prostatic hyperplasia, without bladder neck contracture. Bladder: Normal mucosa, without lesions. Moderate tic at dome.  Numerous small stones on floor of bladder. Ureteral orifice(s) was/were seen bilateral;                             Complications:  None; patient tolerated the procedure well. Condition: stable      Assessment and Plan    ICD-10-CM    1. Malignant neoplasm of urinary bladder, unspecified site (HCC)  C67.9 CYSTOURETHROSCOPY     AMB POC URINALYSIS DIP STICK AUTO W/O MICRO      2. Elevated prostate specific antigen (PSA)  R97.20 PSA, Diagnostic      3. Calculus in bladder  N21.0 CBC with Auto Differential     Urinalysis     Culture, Urine     CT ABDOMEN PELVIS WO CONTRAST Additional Contrast? Radiologist Recommendation      4. Malignant neoplasm of dome of urinary bladder (HCC)  C67.1       5. Benign prostatic hyperplasia with urinary obstruction  N40.1     N13.8       Negative cysto except new finding of recurrent small bladder stones. Discussed options of observation of bladder stone removal. He would like to have them removed. Will set up for cysto, removal of bladder stones. He appears to be emptying his bladder well. Recurrent Struvite bladder stones. Will get renal stone protocol CT. Return for call Tyson Crawford or Kana Weber to schedule surgery.

## 2022-11-04 NOTE — TELEPHONE ENCOUNTER
Using [facility] website:    Per patient's plan-No PA req for cpt 55385 (cystoscopy)    Patient had Tertiary coverage-no patient responsibility

## 2022-11-09 ENCOUNTER — TELEPHONE (OUTPATIENT)
Dept: UROLOGY | Age: 67
End: 2022-11-09

## 2022-11-09 NOTE — TELEPHONE ENCOUNTER
----- Message from Candy Wheeler MD sent at 11/4/2022  3:13 PM EDT -----  cystoscopy, removal of bladder stones  Did orders.

## 2022-11-16 PROBLEM — N21.0 BLADDER STONE: Status: ACTIVE | Noted: 2022-11-16

## 2022-11-16 NOTE — TELEPHONE ENCOUNTER
Procedures: Procedure(s):   CYSTOSCOPY, BLADDER STONE REMOVAL   Date: 11/29/2022   Time: 0900   Location: [facility] OP OR 04     Scheduled.

## 2022-11-22 NOTE — PRE-PROCEDURE INSTRUCTIONS
Patient verified name and . Order for consent was found in EHR and matches case posting; patient verifies procedure. Type lB surgery, phone assessment complete. Orders were received. Labs per surgeon: noted in EHR. Patient is currently out of town and not able to in prior to surgery  Labs per anesthesia protocol: POC Glucose    Patient answered medical/surgical history questions at their best of ability. All prior to admission medications documented in [facility]. Patient instructed to take the following medications the day of surgery according to anesthesia guidelines with a small sip of water: Aspirin,  Amlodipine, Claritin, and Hydralazine       Hold all vitamins 7 days prior to surgery and NSAIDS 5 days prior to surgery. Prescription meds to hold: Spironolactone and Metformin am of surgery    Patient instructed on the following:    > Arrive at [address] [facility], time of arrival to be called the day before by 1700  > NPO after midnight, unless otherwise indicated, including gum, mints, and ice chips  > Responsible adult must drive patient to the hospital, stay during surgery, and patient will need supervision 24 hours after anesthesia  > Use antibacterial soap in shower the night before surgery and on the morning of surgery  > All piercings must be removed prior to arrival.    > Leave all valuables (money and jewelry) at home but bring insurance card and ID on DOS.   > You may be required to pay a deductible or co-pay on the day of your procedure. You can pre-pay by calling [phone] if your surgery is at the [facility] or [phone] if your surgery is at the [facility]. > Do not wear make-up, nail polish, lotions, cologne, perfumes, powders, or oil on skin. Artificial nails are not permitted.

## 2022-11-28 ENCOUNTER — ANESTHESIA EVENT (OUTPATIENT)
Dept: SURGERY | Age: 67
End: 2022-11-28
Payer: COMMERCIAL

## 2022-11-29 ENCOUNTER — HOSPITAL ENCOUNTER (OUTPATIENT)
Age: 67
Setting detail: OUTPATIENT SURGERY
Discharge: HOME | End: 2022-11-29
Attending: UROLOGY | Admitting: UROLOGY
Payer: COMMERCIAL

## 2022-11-29 ENCOUNTER — ANESTHESIA (OUTPATIENT)
Dept: SURGERY | Age: 67
End: 2022-11-29
Payer: COMMERCIAL

## 2022-11-29 VITALS
SYSTOLIC BLOOD PRESSURE: 137 MMHG | HEIGHT: 71 IN | TEMPERATURE: 97.4 F | DIASTOLIC BLOOD PRESSURE: 60 MMHG | RESPIRATION RATE: 18 BRPM | HEART RATE: 78 BPM | BODY MASS INDEX: 32.2 KG/M2 | WEIGHT: 230 LBS | OXYGEN SATURATION: 100 %

## 2022-11-29 DIAGNOSIS — N21.0 BLADDER STONE: ICD-10-CM

## 2022-11-29 LAB
GLUCOSE BLD STRIP.AUTO-MCNC: 209 MG/DL (ref 65–100)
GLUCOSE BLD STRIP.AUTO-MCNC: 211 MG/DL (ref 65–100)
GLUCOSE BLD STRIP.AUTO-MCNC: 220 MG/DL (ref 65–100)
GLUCOSE BLD STRIP.AUTO-MCNC: 224 MG/DL (ref 65–100)
POTASSIUM BLD-SCNC: 3.9 MMOL/L (ref 3.5–5.1)
SERVICE CMNT-IMP: ABNORMAL

## 2022-11-29 PROCEDURE — 84132 ASSAY OF SERUM POTASSIUM: CPT

## 2022-11-29 PROCEDURE — 6360000002 HC RX W HCPCS: Performed by: UROLOGY

## 2022-11-29 PROCEDURE — 82355 CALCULUS ANALYSIS QUAL: CPT

## 2022-11-29 PROCEDURE — 2500000003 HC RX 250 WO HCPCS: Performed by: NURSE ANESTHETIST, CERTIFIED REGISTERED

## 2022-11-29 PROCEDURE — 82962 GLUCOSE BLOOD TEST: CPT

## 2022-11-29 PROCEDURE — 3600000004 HC SURGERY LEVEL 4 BASE: Performed by: UROLOGY

## 2022-11-29 PROCEDURE — 3600000014 HC SURGERY LEVEL 4 ADDTL 15MIN: Performed by: UROLOGY

## 2022-11-29 PROCEDURE — 3700000000 HC ANESTHESIA ATTENDED CARE: Performed by: UROLOGY

## 2022-11-29 PROCEDURE — 2580000003 HC RX 258: Performed by: ANESTHESIOLOGY

## 2022-11-29 PROCEDURE — 7100000001 HC PACU RECOVERY - ADDTL 15 MIN: Performed by: UROLOGY

## 2022-11-29 PROCEDURE — 7100000000 HC PACU RECOVERY - FIRST 15 MIN: Performed by: UROLOGY

## 2022-11-29 PROCEDURE — 3700000001 HC ADD 15 MINUTES (ANESTHESIA): Performed by: UROLOGY

## 2022-11-29 PROCEDURE — C1713 ANCHOR/SCREW BN/BN,TIS/BN: HCPCS | Performed by: UROLOGY

## 2022-11-29 PROCEDURE — 7100000010 HC PHASE II RECOVERY - FIRST 15 MIN: Performed by: UROLOGY

## 2022-11-29 PROCEDURE — 2709999900 HC NON-CHARGEABLE SUPPLY: Performed by: UROLOGY

## 2022-11-29 PROCEDURE — 6370000000 HC RX 637 (ALT 250 FOR IP): Performed by: ANESTHESIOLOGY

## 2022-11-29 PROCEDURE — 88300 SURGICAL PATH GROSS: CPT

## 2022-11-29 PROCEDURE — 52310 CYSTOSCOPY AND TREATMENT: CPT | Performed by: UROLOGY

## 2022-11-29 PROCEDURE — 6360000002 HC RX W HCPCS: Performed by: NURSE ANESTHETIST, CERTIFIED REGISTERED

## 2022-11-29 RX ORDER — LIDOCAINE HYDROCHLORIDE 10 MG/ML
1 INJECTION, SOLUTION INFILTRATION; PERINEURAL
Status: DISCONTINUED | OUTPATIENT
Start: 2022-11-29 | End: 2022-11-29 | Stop reason: HOSPADM

## 2022-11-29 RX ORDER — ACETAMINOPHEN 500 MG
1000 TABLET ORAL ONCE
Status: COMPLETED | OUTPATIENT
Start: 2022-11-29 | End: 2022-11-29

## 2022-11-29 RX ORDER — PROPOFOL 10 MG/ML
INJECTION, EMULSION INTRAVENOUS PRN
Status: DISCONTINUED | OUTPATIENT
Start: 2022-11-29 | End: 2022-11-29 | Stop reason: SDUPTHER

## 2022-11-29 RX ORDER — EPHEDRINE SULFATE/0.9% NACL/PF 50 MG/5 ML
SYRINGE (ML) INTRAVENOUS PRN
Status: DISCONTINUED | OUTPATIENT
Start: 2022-11-29 | End: 2022-11-29 | Stop reason: SDUPTHER

## 2022-11-29 RX ORDER — ONDANSETRON 2 MG/ML
INJECTION INTRAMUSCULAR; INTRAVENOUS PRN
Status: DISCONTINUED | OUTPATIENT
Start: 2022-11-29 | End: 2022-11-29 | Stop reason: SDUPTHER

## 2022-11-29 RX ORDER — IPRATROPIUM BROMIDE AND ALBUTEROL SULFATE 2.5; .5 MG/3ML; MG/3ML
1 SOLUTION RESPIRATORY (INHALATION)
Status: DISCONTINUED | OUTPATIENT
Start: 2022-11-29 | End: 2022-11-29 | Stop reason: HOSPADM

## 2022-11-29 RX ORDER — INSULIN LISPRO 100 [IU]/ML
5 INJECTION, SOLUTION INTRAVENOUS; SUBCUTANEOUS ONCE
Status: COMPLETED | OUTPATIENT
Start: 2022-11-29 | End: 2022-11-29

## 2022-11-29 RX ORDER — PHENAZOPYRIDINE HYDROCHLORIDE 100 MG/1
100 TABLET, FILM COATED ORAL 3 TIMES DAILY PRN
Qty: 30 TABLET | Refills: 2 | Status: SHIPPED | OUTPATIENT
Start: 2022-11-29 | End: 2023-11-29

## 2022-11-29 RX ORDER — DEXAMETHASONE SODIUM PHOSPHATE 4 MG/ML
INJECTION, SOLUTION INTRA-ARTICULAR; INTRALESIONAL; INTRAMUSCULAR; INTRAVENOUS; SOFT TISSUE PRN
Status: DISCONTINUED | OUTPATIENT
Start: 2022-11-29 | End: 2022-11-29 | Stop reason: SDUPTHER

## 2022-11-29 RX ORDER — SODIUM CHLORIDE, SODIUM LACTATE, POTASSIUM CHLORIDE, CALCIUM CHLORIDE 600; 310; 30; 20 MG/100ML; MG/100ML; MG/100ML; MG/100ML
INJECTION, SOLUTION INTRAVENOUS CONTINUOUS
Status: DISCONTINUED | OUTPATIENT
Start: 2022-11-29 | End: 2022-11-29 | Stop reason: HOSPADM

## 2022-11-29 RX ORDER — MIDAZOLAM HYDROCHLORIDE 2 MG/2ML
2 INJECTION, SOLUTION INTRAMUSCULAR; INTRAVENOUS
Status: DISCONTINUED | OUTPATIENT
Start: 2022-11-29 | End: 2022-11-29 | Stop reason: HOSPADM

## 2022-11-29 RX ORDER — SODIUM CHLORIDE 0.9 % (FLUSH) 0.9 %
5-40 SYRINGE (ML) INJECTION EVERY 12 HOURS SCHEDULED
Status: DISCONTINUED | OUTPATIENT
Start: 2022-11-29 | End: 2022-11-29 | Stop reason: HOSPADM

## 2022-11-29 RX ORDER — BLOOD SUGAR DIAGNOSTIC
STRIP MISCELLANEOUS
Qty: 100 STRIP | Refills: 3 | Status: SHIPPED | OUTPATIENT
Start: 2022-11-29

## 2022-11-29 RX ORDER — FENTANYL CITRATE 50 UG/ML
50 INJECTION, SOLUTION INTRAMUSCULAR; INTRAVENOUS EVERY 5 MIN PRN
Status: DISCONTINUED | OUTPATIENT
Start: 2022-11-29 | End: 2022-11-29 | Stop reason: HOSPADM

## 2022-11-29 RX ORDER — ONDANSETRON 2 MG/ML
4 INJECTION INTRAMUSCULAR; INTRAVENOUS
Status: DISCONTINUED | OUTPATIENT
Start: 2022-11-29 | End: 2022-11-29 | Stop reason: HOSPADM

## 2022-11-29 RX ORDER — OXYCODONE HYDROCHLORIDE 5 MG/1
5 TABLET ORAL
Status: DISCONTINUED | OUTPATIENT
Start: 2022-11-29 | End: 2022-11-29 | Stop reason: HOSPADM

## 2022-11-29 RX ORDER — HALOPERIDOL 5 MG/ML
1 INJECTION INTRAMUSCULAR
Status: DISCONTINUED | OUTPATIENT
Start: 2022-11-29 | End: 2022-11-29 | Stop reason: HOSPADM

## 2022-11-29 RX ORDER — SULFAMETHOXAZOLE AND TRIMETHOPRIM 400; 80 MG/1; MG/1
1 TABLET ORAL DAILY
Qty: 10 TABLET | Refills: 0 | Status: SHIPPED | OUTPATIENT
Start: 2022-11-29 | End: 2022-12-04

## 2022-11-29 RX ORDER — SODIUM CHLORIDE 0.9 % (FLUSH) 0.9 %
5-40 SYRINGE (ML) INJECTION PRN
Status: DISCONTINUED | OUTPATIENT
Start: 2022-11-29 | End: 2022-11-29 | Stop reason: HOSPADM

## 2022-11-29 RX ORDER — HYDROMORPHONE HYDROCHLORIDE 2 MG/ML
0.5 INJECTION, SOLUTION INTRAMUSCULAR; INTRAVENOUS; SUBCUTANEOUS EVERY 10 MIN PRN
Status: DISCONTINUED | OUTPATIENT
Start: 2022-11-29 | End: 2022-11-29 | Stop reason: HOSPADM

## 2022-11-29 RX ORDER — LIDOCAINE HYDROCHLORIDE 20 MG/ML
INJECTION, SOLUTION EPIDURAL; INFILTRATION; INTRACAUDAL; PERINEURAL PRN
Status: DISCONTINUED | OUTPATIENT
Start: 2022-11-29 | End: 2022-11-29 | Stop reason: SDUPTHER

## 2022-11-29 RX ADMIN — LIDOCAINE HYDROCHLORIDE 100 MG: 20 INJECTION, SOLUTION EPIDURAL; INFILTRATION; INTRACAUDAL; PERINEURAL at 07:45

## 2022-11-29 RX ADMIN — Medication 10 MG: at 07:45

## 2022-11-29 RX ADMIN — PROPOFOL 200 MG: 10 INJECTION, EMULSION INTRAVENOUS at 07:45

## 2022-11-29 RX ADMIN — ONDANSETRON 4 MG: 2 INJECTION INTRAMUSCULAR; INTRAVENOUS at 08:23

## 2022-11-29 RX ADMIN — Medication 2000 MG: at 07:51

## 2022-11-29 RX ADMIN — ACETAMINOPHEN 1000 MG: 500 TABLET ORAL at 06:49

## 2022-11-29 RX ADMIN — INSULIN LISPRO 5 UNITS: 100 INJECTION, SOLUTION INTRAVENOUS; SUBCUTANEOUS at 06:45

## 2022-11-29 RX ADMIN — DEXAMETHASONE SODIUM PHOSPHATE 4 MG: 4 INJECTION, SOLUTION INTRAMUSCULAR; INTRAVENOUS at 08:22

## 2022-11-29 RX ADMIN — SODIUM CHLORIDE, POTASSIUM CHLORIDE, SODIUM LACTATE AND CALCIUM CHLORIDE: 600; 310; 30; 20 INJECTION, SOLUTION INTRAVENOUS at 06:30

## 2022-11-29 ASSESSMENT — PAIN - FUNCTIONAL ASSESSMENT: PAIN_FUNCTIONAL_ASSESSMENT: 0-10

## 2022-11-29 NOTE — OP NOTE
[address] [facility]  OPERATIVE REPORT    Name:  Shameka Hunt  MR#:  [id number]  :  1955  ACCOUNT #:  [de-identified]  DATE OF SERVICE:  2022    PREOPERATIVE DIAGNOSIS:  Bladder stones. POSTOPERATIVE DIAGNOSIS:  Bladder stones. PROCEDURE PERFORMED:  Cystoscopy and removal of bladder stones. SURGEON:  Joanna Murcia MD    ASSISTANT:  None. ANESTHESIA:  General.    COMPLICATIONS:  None. SPECIMENS REMOVED:  Bladder stone. IMPLANTS:  None. ESTIMATED BLOOD LOSS:  minimal.    INDICATIONS:  The patient with history of stage T1 bladder cancer treated successfully with BCG in the past.  He has BPH and history of bladder stones. Recent cystoscopy had shown numerous small bladder stones. Previous stone analysis had been struvite. We had ordered a CT urogram, but he states that nobody has called him to schedule that study. FINDINGS:  Trilobar enlargement of the prostate. No mucosal lesions in the bladder. A moderate amount of small stones on the floor of the bladder. PROCEDURE:  The patient was given a general anesthetic, placed in the dorsal lithotomy position. He was prepped and draped in sterile fashion. I passed a 22-Tongan cystoscope into the urethra using video camera 30-degree lens. The anterior urethra was normal.  Prostate shows very large lateral lobe hypertrophy with a long prostatic fossa. There is a moderate median lobe that is well producing obstruction at the bladder neck. Scope was passed in the bladder. There are numerous small white stones on the floor of the bladder. Both ureteral orifices appeared normal.  We carefully examined the bladder mucosa. There is a moderate diverticulum at the left dome. The mucosa of the diverticulum was inspected and it appears normal.  There were no mucosal lesions noted. The rest of bladder shows 2+ trabeculation. I used a Holly syringe to irrigate the bladder stones out of the bladder.   Some small stones were sent for chemical analysis. At the end of procedure, there were no visible stones left in the bladder. The scope was removed after draining the bladder. PLAN:  He will be discharged home on antibiotics and Pyridium. We are going to make sure he gets a CT scan and return to our office in approximately six weeks.       MD DENIA [email or web address]  D:  11/29/2022 8:43  T:  11/29/2022 14:43  JOB #:  [id number]

## 2022-11-29 NOTE — DISCHARGE INSTRUCTIONS
Cystoscopy: What to Expect at [address] [facility]     A cystoscopy is a procedure that lets a doctor look inside of the bladder and the urethra. The urethra is the tube that carries urine from the bladder to outside the body. The doctor uses a thin, lighted tool called a cystoscope. Your bladder is filled with fluid. This stretches the bladder so that your doctor can look closely at the inside of your bladder. After the cystoscopy, your urethra may be sore at first, and it may burn when you urinate for the first few days after the procedure. You may feel the need to urinate more often, and your urine may be pink. These symptoms should get better in 1 or 2 days. You will probably be able to go back to most of your usual activities in 1 or 2 days. This care sheet gives you a general idea about how long it will take for you to recover. But each person recovers at a different pace. Follow the steps below to get better as quickly as possible. How can you care for yourself at home? Activity    Rest when you feel tired. Getting enough sleep will help you recover. Try to walk each day. Start by walking a little more than you did the day before. Bit by bit, increase the amount you walk. Walking boosts blood flow and helps prevent pneumonia and constipation. Avoid strenuous activities, such as bicycle riding, jogging, weight lifting, or aerobic exercise, until your doctor says it is okay. Ask your doctor when you can drive again. Most people are able to return to work within 1 or 2 days after the procedure. You may shower and take baths as usual.     Ask your doctor when it is okay for you to have sex. Diet    You can eat your normal diet. If your stomach is upset, try bland, low-fat foods like plain rice, broiled chicken, toast, and yogurt. Drink plenty of fluids (unless your doctor tells you not to). Medicines    Take pain medicines exactly as directed.   If the doctor gave you a prescription medicine for pain, take it as prescribed. If you are not taking a prescription pain medicine, ask your doctor if you can take an over-the-counter medicine. If you think your pain medicine is making you sick to your stomach: Take your medicine after meals (unless your doctor has told you not to). Ask your doctor for a different pain medicine. If your doctor prescribed antibiotics, take them as directed. Do not stop taking them just because you feel better. You need to take the full course of antibiotics. Follow-up care is a key part of your treatment and safety. Be sure to make and go to all appointments, and call your doctor if you are having problems. It's also a good idea to know your test results and keep a list of the medicines you take. When should you call for help? Call 911 anytime you think you may need emergency care. For example, call if:    You passed out (lost consciousness). You have severe trouble breathing. You have sudden chest pain and shortness of breath, or you cough up blood. You have severe belly pain. Call your doctor now or seek immediate medical care if:    You are sick to your stomach or cannot keep fluids down. Your urine is still red or you see blood clots after you have urinated several times. You have trouble passing urine or stool, especially if you have pain or swelling in your lower belly. You have signs of a blood clot, such as:  Pain in your calf, back of the knee, thigh, or groin. Redness and swelling in your leg or groin. You develop a fever or severe chills. You have pain in your back just below your rib cage. This is called flank pain. Watch closely for changes in your health, and be sure to contact your doctor if:    You have pain or burning when you urinate. A burning feeling is normal for a day or two after the test, but call if it does not get better.      You have a frequent urge to urinate but can pass only small amounts of urine. Your urine is pink, red, or cloudy, or smells bad. It is normal for the urine to have a pinkish color for a few days after the test, but call if it does not get better. PAIN   Take pain medication as directed by your doctor. Call your doctor if pain is NOT relieved by medication. CALL YOUR DOCTOR IF   Excessive bleeding that does not stop after holding pressure over the area. Temperature of 101 degrees F or above. Excessive redness, swelling or bruising, and/or green or yellow, smelly discharge from incision. After general anesthesia or intravenous sedation, for 24 hours or while taking prescription Narcotics:   Limit your activities   A responsible adult needs to be with you for the next 24 hours   Do not drive and operate hazardous machinery   Do not make important personal or business decisions   Do not drink alcoholic beverages       If you have not urinated within 8 hours after discharge, and you are experiencing discomfort from urinary retention, please go to the nearest ED. If you have sleep apnea and have a CPAP machine, please use it for all naps and sleeping. Please use caution when taking narcotics and any of your home medications that may cause drowsiness. * Please give a list of your current medications to your Primary Care Provider. * Please update this list whenever your medications are discontinued, doses are   changed, or new medications (including over-the-counter products) are added. * Please carry medication information at all times in case of emergency situations. These are general instructions for a healthy lifestyle:   No smoking/ No tobacco products/ Avoid exposure to second hand smoke   Surgeon General's Warning: Quitting smoking now greatly reduces serious risk to your health.    Obesity, smoking, and sedentary lifestyle greatly increases your risk for illness   A healthy diet, regular physical exercise & weight monitoring are important for maintaining a healthy lifestyle   You may be retaining fluid if you have a history of heart failure or if you experience any of the following symptoms: Weight gain of 3 pounds or more overnight or 5 pounds in a week, increased swelling in our hands or feet or shortness of breath while lying flat in bed. Please call your doctor as soon as you notice any of these symptoms; do not wait until your next office visit.

## 2022-11-29 NOTE — ANESTHESIA POSTPROCEDURE EVALUATION
Department of Anesthesiology  Postprocedure Note    Patient: Clay Vaca   MRN: [id number]  YOB: 1955  Date of evaluation: 11/29/2022      Procedure Summary     Date: 11/29/22 Room / Location: [facility] OP OR 05 / [facility]    Anesthesia Start: 0732 Anesthesia Stop: Wilbert Gauss    Procedure: CYSTOSCOPY, BLADDER STONE REMOVAL (Bladder) Diagnosis:       Bladder stone      (Bladder stone [N21.0])    Surgeons: Emily Hilario MD Responsible Provider: Watson Ramos MD    Anesthesia Type: General ASA Status: 2          Anesthesia Type: General    Natalia Phase I: Natalia Score: 10    Natalia Phase II: Natalia Score: 10      Anesthesia Post Evaluation    Patient location during evaluation: PACU  Patient participation: complete - patient participated  Level of consciousness: awake and alert  Airway patency: patent  Nausea & Vomiting: no nausea and no vomiting  Complications: no  Cardiovascular status: hemodynamically stable  Respiratory status: acceptable, nonlabored ventilation and spontaneous ventilation  Hydration status: euvolemic  Comments: /60   Pulse 78   Temp 97.4 °F (36.3 °C) (Temporal)   Resp 18   Ht 5' 11\" (1.803 m)   Wt 230 lb (104.3 kg)   SpO2 100%   BMI 32.08 kg/m²     Multimodal analgesia pain management approach

## 2022-11-29 NOTE — BRIEF OP NOTE
Brief Postoperative Note      Patient: Shan Steiner   YOB: 1955  MRN: [id number]    Date of Procedure: 11/29/2022    Pre-Op Diagnosis: Bladder stone [N21.0]    Post-Op Diagnosis: Same       Procedure(s):  CYSTOSCOPY, BLADDER STONE REMOVAL    Surgeon(s):  Jim Du MD    Assistant:  * No surgical staff found *    Anesthesia: General    Estimated Blood Loss (mL): Minimal    Complications: None    Specimens:   ID Type Source Tests Collected by Time Destination   1 : BLADDER STONES Stone (Calculus) Bladder STONE ANALYSIS Jim Du MD 11/29/2022 4332        Implants:  * No implants in log *      Drains: * No LDAs found *    Findings: see op note    Electronically signed by Bernard Jennings MD on 11/29/2022 at 8:34 AM

## 2022-11-29 NOTE — ANESTHESIA PROCEDURE NOTES
Airway  Date/Time: 11/29/2022 7:47 AM  Urgency: elective    Airway not difficult    General Information and Staff    Patient location during procedure: OR  Resident/CRNA: ABIEL Marie - CRNA  Performed: resident/CRNA     Indications and Patient Condition  Indications for airway management: anesthesia  Spontaneous ventilation: present  Sedation level: deep  Preoxygenated: yes  Patient position: sniffing  MILS not maintained throughout  Mask difficulty assessment: vent by bag mask    Final Airway Details  Final airway type: supraglottic airway      Successful airway: oropharyngeal  Size 4.5     Number of attempts at approach: 1  Ventilation between attempts: supraglottic airway  Number of other approaches attempted: 0    Additional Comments  ETCO2+BBS=/dentition and lips as preop/ Leak noted at 20 cmH2O.   No additional air added to cuff of 4.5 Air- ASHLEY placed by MICHAEL Sheehan cRNA  no

## 2022-11-29 NOTE — ANESTHESIA PRE PROCEDURE
Department of Anesthesiology  Preprocedure Note       Name:  Frank Ventura. Age:  75 y.o.  :  1955                                          MRN:  [id number]         Date:  2022      Surgeon: Lida Kolb):  Rabia Arango MD    Procedure: Procedure(s):  CYSTOSCOPY, BLADDER STONE REMOVAL    Medications prior to admission:   Prior to Admission medications    Medication Sig Start Date End Date Taking? Authorizing Provider   Multiple Vitamins-Minerals (MULTIVITAMIN MEN PO) Take by mouth daily   Yes Historical Provider, MD   amLODIPine (NORVASC) 10 MG tablet Take 1 tablet by mouth daily 10/19/22   Igor Driscoll, DO   hydrALAZINE (APRESOLINE) 100 MG tablet TAKE 1 TABLET BY MOUTH TWICE DAILY 10/10/22   Gretta Sullivan MD   Probiotic Product (PROBIOTIC-10 PO) Take by mouth daily as needed    Historical Provider, MD   spironolactone (ALDACTONE) 25 MG tablet Take 1 tablet by mouth daily 22   Igor Driscoll, DO   simvastatin (ZOCOR) 20 MG tablet Take 1 tablet by mouth nightly 22   Miladys Samuels MD   valsartan (DIOVAN) 40 MG tablet Take 1 tablet by mouth in the morning. Patient taking differently: Take 40 mg by mouth nightly 22   Miladys Samuels MD   metFORMIN (GLUCOPHAGE) 500 MG tablet Take 1 tablet by mouth in the morning and 1 tablet in the evening. Take with meals.  22   Miladys Samuels MD   ascorbic acid (VITAMIN C) 250 MG tablet Take 500 mg by mouth    Ar Automatic Reconciliation   aspirin 81 MG EC tablet Take 81 mg by mouth    Ar Automatic Reconciliation   loratadine (CLARITIN) 10 MG tablet Take 10 mg by mouth daily 12/10/19   Ar Automatic Reconciliation       Current medications:    Current Facility-Administered Medications   Medication Dose Route Frequency Provider Last Rate Last Admin    lidocaine 1 % injection 1 mL  1 mL IntraDERmal Once PRN Sabine Benjamin MD        acetaminophen (TYLENOL) tablet 1,000 mg  1,000 mg Oral Once Sabine Benjamin MD  famotidine (PEPCID) 20 mg in sodium chloride (PF) 0.9 % 10 mL injection  20 mg IntraVENous Once PRN Eleuterio Kam MD        lactated ringers infusion   IntraVENous Continuous Eleuterio Kam MD        midazolam PF (VERSED) injection 2 mg  2 mg IntraVENous Once PRN Eleuterio Kam MD        ipratropium-albuterol (DUONEB) nebulizer solution 1 ampule  1 ampule Inhalation Once PRN Eleuterio Kam MD        ceFAZolin (ANCEF) 2000 mg in sterile water 20 mL IV syringe  2,000 mg IntraVENous On Call to [facility] MD           Allergies: Allergies   Allergen Reactions    Macadamia Nut Oil Anaphylaxis     Tongue and throat swelling with almonds. States that he recently noticed symptoms with eating peanuts.  Ace Inhibitors Other (See Comments)    Codeine Nausea And Vomiting       Problem List:    Patient Active Problem List   Diagnosis Code    Hypersomnia G47.10    Obesity (BMI 30.0-34. 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    Palpitation R00.2    Bladder stone N21.0       Past Medical History:        Diagnosis Date    Arthritis     back-     Bladder [facility] [address]     Chronic pain     back--pain comes and goes    Colon polyps 2/7/2013    Coronary atherosclerosis of native coronary vessel 03/28/2016    no mi/ no stents---- followed by dr Candance Horner COVID-19 vaccine series completed 03/29/2021    Dustin Painting DDD (degenerative disc disease) 02/07/2013    lower back    Diabetes ([facility]     pre-diabetic    GERD (gastroesophageal reflux disease)     occ med    History of kidney stones     x 1, passed on own    Hypertension     controlled by medication    Hypertrophy of prostate with urinary obstruction and other lower urinary tract symptoms (LUTS) 2/27/2014    Hypertrophy of prostate with urinary obstruction and other lower urinary tract symptoms (LUTS)     Malignant neoplasm of bladder, part unspecified dx- summer 2012    surg/ BCG tx---- followed by dr Ector Reid obesity ([facility]     bmi 34    Prediabetes     diet controlled---- sqbs avg am- ---- hypo at [de-identified]    Pure hypercholesterolemia     managed with medication     Sleep apnea     wears cpap at hs    Urinary frequency        Past Surgical History:        Procedure Laterality Date    AMPUTATION Right     third finger due to a tumor in finger    CLOSE CYSTOSTOMY  9/2012    several cysto w/ bladder biopsy; was cancer    CLOSE CYSTOSTOMY  07/2021    removed several bladder stones    COLONOSCOPY  2013    COLONOSCOPY N/A 2/14/2018    COLONOSCOPY / BMI=34 performed by Mell Banks MD at [address] Bilateral 2000's    brian toe surgery (great toe), removal of arthritis from toes    TUMOR REMOVAL  1974    scalp       Social History:    Social History     Tobacco Use    Smoking status: Never    Smokeless tobacco: Never   Substance Use Topics    Alcohol use:  No                                Counseling given: Not Answered      Vital Signs (Current):   Vitals:    11/22/22 1421   Weight: 230 lb (104.3 kg)   Height: 5' 11\" (1.803 m)                                              BP Readings from Last 3 Encounters:   10/19/22 (!) 172/90   10/12/22 (!) 158/80   09/22/22 (!) 164/80       NPO Status:                                                                                 BMI:   Wt Readings from Last 3 Encounters:   11/22/22 230 lb (104.3 kg) 10/19/22 236 lb (107 kg)   10/12/22 235 lb (106.6 kg)     Body mass index is 32.08 kg/m². CBC:   Lab Results   Component Value Date/Time    WBC 5.4 07/01/2021 06:11 AM    RBC 5.02 07/01/2021 06:11 AM    HGB 13.2 07/01/2021 06:11 AM    HCT 42.6 07/01/2021 06:11 AM    MCV 84.9 07/01/2021 06:11 AM    RDW 14.5 07/01/2021 06:11 AM     07/01/2021 06:11 AM       CMP:   Lab Results   Component Value Date/Time     10/10/2022 10:16 AM    K 3.8 10/10/2022 10:16 AM     10/10/2022 10:16 AM    CO2 29 10/10/2022 10:16 AM    BUN 17 10/10/2022 10:16 AM    CREATININE 1.00 10/10/2022 10:16 AM    GFRAA 111 10/13/2021 09:55 AM    AGRATIO 1.8 04/14/2022 10:12 AM    LABGLOM >60 10/10/2022 10:16 AM    GLUCOSE 133 10/10/2022 10:16 AM    PROT 7.1 10/10/2022 10:16 AM    CALCIUM 9.1 10/10/2022 10:16 AM    BILITOT 0.7 10/10/2022 10:16 AM    ALKPHOS 72 10/10/2022 10:16 AM    ALKPHOS 75 04/14/2022 10:12 AM    AST 20 10/10/2022 10:16 AM    ALT 26 10/10/2022 10:16 AM       POC Tests: No results for input(s): POCGLU, POCNA, POCK, POCCL, POCBUN, POCHEMO, POCHCT in the last 72 hours.     Coags: No results found for: PROTIME, INR, APTT    HCG (If Applicable): No results found for: PREGTESTUR, PREGSERUM, HCG, HCGQUANT     ABGs: No results found for: PHART, PO2ART, TAL8GIA, TER4YPN, BEART, K7MTSUYM     Type & Screen (If Applicable):  No results found for: LABABO, LABRH    Drug/Infectious Status (If Applicable):  No results found for: HIV, HEPCAB    COVID-19 Screening (If Applicable): No results found for: COVID19        Anesthesia Evaluation  Patient summary reviewed  Airway: Mallampati: II          Dental: normal exam         Pulmonary:Negative Pulmonary ROS breath sounds clear to auscultation  (+) sleep apnea: on CPAP,                             Cardiovascular:  Exercise tolerance: good (>4 METS),   (+) hypertension:, CAD:, hyperlipidemia    (-) past MI, murmur and peripheral edema      Rhythm: regular  Rate: normal Neuro/Psych:   Negative Neuro/Psych ROS     (-) CVA           GI/Hepatic/Renal: Neg GI/Hepatic/Renal ROS  (+) GERD:, renal disease: kidney stones,          ROS comment: Obesity  . Endo/Other: Negative Endo/Other ROS   (+) Diabetes (Pre-), . Abdominal:             Vascular: negative vascular ROS. Other Findings:           Anesthesia Plan      general     ASA 2     (LMA)  Induction: intravenous. Anesthetic plan and risks discussed with patient.                         Sara Sanchez MD   11/29/2022

## 2022-11-29 NOTE — H&P
Hermilo Delgado. : 1955           HPI   75 y.o., male With hx of bladder cancer, elevated PSA. S/p TURBT 10-8-12, for stage T1 bladder cancer. Had a papillary tumor at the right dome and right lateral wall. Pathology shows papillary high-grade urothelial carcinoma with   focal superficial lamina propria invasion from the bladder dome   tumor. Muscularis propria was not identified. We also did a separate biopsy of the bladder dome adjacent to the   tumor. It shows high grade urothelial carcinoma with associated   superficial lamina propria invasion, muscularis propria not   identified. The right lateral wall tumor shows papillary high-grade   urothelial carcinoma, definitive features of invasion are not   identified, muscularis propria is not identified. We did a re-biopsy of the resection base. There is focally   necrotic tissue present, no carcinoma noted. Completed 6 week induction course of BCG in . Follow up cysto showed tumor on left dome, flat red area right   dome. S/P TURBT on 13. Papillary tumor left dome shows high grade   papillary tumor, no invasion. Right dome flat area shows focal   features of high grade urothelial carcinoma, Separate fragment of dysplastic urothelium. Small fragment of muscularis present,   uninvolved by tumor. Cystoscopy in 2013 shows BPH, 4+ trabeculation, no tumors. Atypical cytology with negative fish test.--S/P Cystoscopy with   bladder biopsy on 10/17/13. Pathology revealed urothelial atypia. Has fnished 6 weeks of BCG at end of . Continues on Flomax. Completed  BCG maintenance. Cytology in  showed atypical cells, but negative FISH. Had 3   week BCG in . Negative cystoscopy in 2014. Cytology showed atypical   Cells. Cysto in 4-15 showed flat red area right bladder . Negative FISH. Cystoscopy in 2015 showed that the red flat area was   smaller.   Cytology showed atypical cells.  Finished BCG 3 in September 2015, and 4-16. Finished last BCG 10-28-16.negative cytology in 5-16. Cysto showed red edematous area right anterior bladder. Had MRI to evaluate the bladder mass on 12-17-16: Impression:   1. Small sessile mass in the right anterolateral urinary bladder wall  consistent with patient's known primary urothelial carcinoma, suspect this is a  T3 lesion. 2. No evidence of local perivesicular or pelvic metastatic disease, MR radha staging N0.  3. Other incidental findings such small urachal diverticulum as above. Had TUR of the bladder lesion of 12-22-16. Path:   DIAGNOSIS   BLADDER LESION: TISSUE CONSISTENT WITH BLADDER MUCOSA HAVING EDEMA AND CHRONIC INFLAMMATION OF LAMINA PROPRIA WITHOUT SIGNIFICANT LINING MUCOSA     Cytology in 4-17 showed rare atypical cells. PSA 1.8 in 2017. Cysto in 5-18 showed diverticulum at dome with surrounding cellulitis. Had pelvic MRI in 5-18: IMPRESSION:   1. The previously seen sessile lesion well evident at the right bladder is  significantly decreased and now at the right bladder wall there is only some  nonfocal mild bladder wall thickening which does not enhance-appearance at this  is clearly improved. 2. No evidence for metastatic disease in the pelvis. 3. Stable chronic changes including a small bladder diverticulum at the anterior  dome and prostate enlargement secondary to BPH again seen-these seem stable. He has started having more urgency since starting a diuretic. Started Tamsulosin in 2018. He stopped it, no voiding complaints now. Cysto in 12-19 showed small bladder stones. PSA 4.4 in 4-20. He has no voiding complaints. Cysto in 5-20 showed bladder stones were larger, no tumors. MRI 5-28-20: Findings:  PROSTATE SIZE: The gland measures 6.9 x 4.6 x 4.5 cm.      PERIPHERAL GLAND: Few patchy areas of decreased T2 signal throughout the  peripheral zone right greater than left without focal nodule, decreased ADC  signal, or early arterial enhancement. CENTRAL GLAND: There are T2 hyperintense nodules in the central gland consistent  with hypertrophy. CAPSULE: The prostatic capsule is maintained without evidence of metastatic  spread. PERINEURAL: The neurovascular bundles and posterolateral periprostatic fat are  normal without evidence of tumor extension. SEMINAL VESICLES: The seminal vesicles and ejaculatory ducts are normal.      LYMPH NODES: No pelvic lymph adenopathy. Limited imaging of the lower abdomen  shows no adenopathy or free fluid. BONES: No aggressive osseous lesion. BLADDER AND RECTUM: There is a small diverticulum at the bladder dome best  visualized on the coronal data set series 7 image 7. The bladder is otherwise  unremarkable. Rectum unremarkable. IMPRESSION  IMPRESSION:  1. No discernible lesion seen. No evidence of disease spread beyond the  prostatic capsule. 2. No lymphadenopathy. May need cysto and removal of bladder stones, possible MRI fusion biopsy. PSA 2.9 in 12-20. He has no hematuria or difficulty voiding . PSA 2.8 in 6-21. Cysto in 7-21 showed BPH,  Numerous bladder stones, 10-15, largest 1 cm. on 7-1-21 had cystolitholapaxy. bladder stones, struvite on analysis recommended that he acidify urine with Vit C 500 mg po   PSA 3.3 in 10-22.            Past Medical History:   Diagnosis Date    Arthritis       back-     Bladder cancer ([address]      Chronic pain       back--pain comes and goes    Colon polyps 2/7/2013    Coronary atherosclerosis of native coronary vessel 03/28/2016     no mi/ no stents---- followed by dr Yancy Robles    COVID-19 vaccine series completed 03/29/2021     moderna    DDD (degenerative disc disease) 02/07/2013     lower back    Diabetes ([address]       pre-diabetic    GERD (gastroesophageal reflux disease)       occ med    History of kidney stones       x 1, passed on own    Hypertension       controlled by medication    Hypertrophy of prostate with urinary obstruction and other lower urinary tract symptoms (LUTS) 2/27/2014    Hypertrophy of prostate with urinary obstruction and other lower urinary tract symptoms (LUTS)      Malignant neoplasm of bladder, part unspecified dx- summer 2012     surg/ BCG tx---- followed by dr Sherif Eason    Morbid obesity ([facility] [address]       bmi 34    Prediabetes       diet controlled---- sqbs avg am- ---- hypo at 80's    Pure hypercholesterolemia       managed with medication     Sleep apnea       wears cpap at hs    Urinary frequency              Past Surgical History:   Procedure Laterality Date    AMPUTATION Right       third finger due to a tumor in finger    CLOSE CYSTOSTOMY   9/2012     several cysto w/ bladder biopsy; was cancer    CLOSE CYSTOSTOMY   07/2021     removed several bladder stones    COLONOSCOPY   2013    COLONOSCOPY N/A 2/14/2018     COLONOSCOPY / BMI=34 performed by Madiha De La Garza MD at [address] [facility] Bilateral 2000's     brian toe surgery (great toe), removal of arthritis from toes    TUMOR REMOVAL   1974     scalp             Current Outpatient Medications   Medication Sig Dispense Refill    amLODIPine (NORVASC) 10 MG tablet Take 1 tablet by mouth daily 90 tablet 1    hydrALAZINE (APRESOLINE) 100 MG tablet TAKE 1 TABLET BY MOUTH TWICE DAILY 180 tablet 1    Multiple Vitamin (MULTIVITAMIN ADULT PO) Take by mouth        Probiotic Product (PROBIOTIC-10 PO) Take by mouth        spironolactone (ALDACTONE) 25 MG tablet Take 1 tablet by mouth daily 90 tablet 1    simvastatin (ZOCOR) 20 MG tablet Take 1 tablet by mouth nightly 90 tablet 3    valsartan (DIOVAN) 40 MG tablet Take 1 tablet by mouth in the morning. 90 tablet 3    metFORMIN (GLUCOPHAGE) 500 MG tablet Take 1 tablet by mouth in the morning and 1 tablet in the evening. Take with meals.  180 tablet 3    ascorbic acid (VITAMIN C) 250 MG tablet Take by mouth        aspirin 81 MG EC tablet Take 81 mg by mouth        loratadine (CLARITIN) 10 MG tablet Take 10 mg by mouth daily        potassium chloride (KLOR-CON M) 20 MEQ extended release tablet Take 1 tablet by mouth once daily BEFORE LUNCH          No current facility-administered medications for this visit. Allergies   Allergen Reactions    Macadamia Nut Oil Anaphylaxis       Tongue and throat swelling with almonds. States that he recently noticed symptoms with eating peanuts. Ace Inhibitors Other (See Comments)    Codeine Nausea And Vomiting      Social History            Socioeconomic History    Marital status:        Spouse name: Not on file    Number of children: Not on file    Years of education: Not on file    Highest education level: Not on file   Occupational History    Not on file   Tobacco Use    Smoking status: Never    Smokeless tobacco: Never   Substance and Sexual Activity    Alcohol use: No    Drug use: No    Sexual activity: Not on file   Other Topics Concern    Not on file   Social History Narrative    Not on file      Social Determinants of Health      Financial Resource Strain: Not on file   Food Insecurity: Not on file   Transportation Needs: Not on file   Physical Activity: Not on file   Stress: Not on file   Social Connections: Not on file   Intimate Partner Violence: Not on file   Housing Stability: Not on file            Family History   Problem Relation Age of Onset    Hypertension Sister      Hypertension Brother      Hypertension Brother      Stroke Mother      Diabetes Mother      Heart Disease Father      Coronary Art Dis Father      Stroke Father      Hypertension Mother      Cancer Father           prostate         There were no vitals taken for this visit. Physical Exam  General   Mental Status - Patient is alert and oriented X3. Build & Nutrition - Well nourished.         Chest and Lung Exam   Chest and lung exam reveals  - normal excursion with symmetric chest walls, quiet, even and easy respiratory effort with no use of accessory muscles and on auscultation, normal breath sounds, no adventitious sounds and normal vocal resonance. Cardiovascular   Cardiovascular examination reveals  - normal heart sounds, regular rate and rhythm with no murmurs. Abdomen   Palpation/Percussion: Palpation and Percussion of the abdomen reveal - Non Tender, No Rebound tenderness, No Rigidity (guarding), No hepatosplenomegaly, No Palpable abdominal masses and Soft. Hernia - Bilateral - No Hernia(s) present. Cystoscopy Procedure:      Procedure Room # 3  Scope ID:       dispos  Assistant:       lee ann     Cystoscopy Procedure Note     Procedure Details   The risks, benefits, complications, treatment options, and expected outcomes were discussed with the patient. The patient concurred with the proposed plan, giving informed consent. Cystoscopy was performed today under local anesthesia, using sterile technique. The patient was placed in the lithotomy position, prepped with Betadine, and draped in the usual sterile fashion. A  flexiblecystoscope was used to inspect both the urethra and bladder using the 30 and 70 degree lenses. Findings:  Anterior urethra: normal without strictures. Prostatic urethra:  large bilobar prostatic hyperplasia, without bladder neck contracture. Bladder: Normal mucosa, without lesions. Moderate tic at dome. Numerous small stones on floor of bladder. Ureteral orifice(s) was/were seen bilateral;                              Complications:  None; patient tolerated the procedure well. Condition: stable        Assessment and Plan      ICD-10-CM     1. Malignant neoplasm of urinary bladder, unspecified site (HCC)  C67.9 CYSTOURETHROSCOPY       AMB POC URINALYSIS DIP STICK AUTO W/O MICRO       2. Elevated prostate specific antigen (PSA)  R97.20 PSA, Diagnostic       3.  Calculus in bladder  N21.0 CBC with Auto Differential       Urinalysis       Culture, Urine       CT ABDOMEN PELVIS WO CONTRAST Additional Contrast? Radiologist Recommendation       4. Malignant neoplasm of dome of urinary bladder (HCC)  C67.1         5. Benign prostatic hyperplasia with urinary obstruction  N40.1       N13.8         Negative cysto except new finding of recurrent small bladder stones. Discussed options of observation of bladder stone removal. He would like to have them removed. Will set up for cysto, removal of bladder stones. He appears to be emptying his bladder well. Recurrent Struvite bladder stones. Will get renal stone protocol CT.

## 2022-12-12 ENCOUNTER — HOSPITAL ENCOUNTER (OUTPATIENT)
Dept: CT IMAGING | Age: 67
Discharge: HOME | End: 2022-12-15
Payer: COMMERCIAL

## 2022-12-12 DIAGNOSIS — N21.0 CALCULUS IN BLADDER: ICD-10-CM

## 2022-12-12 PROCEDURE — 74176 CT ABD & PELVIS W/O CONTRAST: CPT

## 2022-12-27 ENCOUNTER — OFFICE VISIT (OUTPATIENT)
Dept: CARDIOLOGY CLINIC | Age: 67
End: 2022-12-27
Payer: MEDICARE

## 2022-12-27 VITALS
HEART RATE: 80 BPM | SYSTOLIC BLOOD PRESSURE: 158 MMHG | BODY MASS INDEX: 32.62 KG/M2 | HEIGHT: 71 IN | DIASTOLIC BLOOD PRESSURE: 88 MMHG | WEIGHT: 233 LBS

## 2022-12-27 DIAGNOSIS — R00.2 PALPITATION: ICD-10-CM

## 2022-12-27 DIAGNOSIS — I25.10 ATHEROSCLEROSIS OF NATIVE CORONARY ARTERY OF NATIVE HEART WITHOUT ANGINA PECTORIS: Primary | ICD-10-CM

## 2022-12-27 DIAGNOSIS — I10 ESSENTIAL HYPERTENSION: ICD-10-CM

## 2022-12-27 PROCEDURE — G8484 FLU IMMUNIZE NO ADMIN: HCPCS | Performed by: INTERNAL MEDICINE

## 2022-12-27 PROCEDURE — 99214 OFFICE O/P EST MOD 30 MIN: CPT | Performed by: INTERNAL MEDICINE

## 2022-12-27 PROCEDURE — 1123F ACP DISCUSS/DSCN MKR DOCD: CPT | Performed by: INTERNAL MEDICINE

## 2022-12-27 PROCEDURE — G8427 DOCREV CUR MEDS BY ELIG CLIN: HCPCS | Performed by: INTERNAL MEDICINE

## 2022-12-27 PROCEDURE — 3017F COLORECTAL CA SCREEN DOC REV: CPT | Performed by: INTERNAL MEDICINE

## 2022-12-27 PROCEDURE — G8417 CALC BMI ABV UP PARAM F/U: HCPCS | Performed by: INTERNAL MEDICINE

## 2022-12-27 PROCEDURE — 3078F DIAST BP <80 MM HG: CPT | Performed by: INTERNAL MEDICINE

## 2022-12-27 PROCEDURE — 3074F SYST BP LT 130 MM HG: CPT | Performed by: INTERNAL MEDICINE

## 2022-12-27 PROCEDURE — 1036F TOBACCO NON-USER: CPT | Performed by: INTERNAL MEDICINE

## 2022-12-27 NOTE — PROGRESS NOTES
[address], [address]  PHONE: [phone]     22    NAME:  Vania Adams. : 1955  MRN: [id number]       SUBJECTIVE:   Vania Adams. is a 75 y.o. male seen for a follow up visit regarding the following:     Chief Complaint   Patient presents with    Coronary Artery Disease       HPI: Here for CAD. CAD (mild CAD  [facility])   HTN (Ace cough)   AMY on CPAP now since .   NST 2019: low risk  Echo 10/2022: normal EF, LVH     Home SBP 130s, feeling better on meds. Norvasc 10 has helped. No new angina, CP, PALACIOS, SOB. Walking more at work. Patient denies recent history of orthopnea, PND, excessive dizziness and/or syncope. Followed for bladder CA, no issues, followed yearly now. Past Medical History, Past Surgical History, Family history, Social History, and Medications were all reviewed with the patient today and updated as necessary. Current Outpatient Medications   Medication Sig Dispense Refill    ONETOUCH ULTRA strip USE TO CHECK BLOOD SUGAR EVERY  strip 3    Multiple Vitamins-Minerals (MULTIVITAMIN MEN PO) Take by mouth daily      amLODIPine (NORVASC) 10 MG tablet Take 1 tablet by mouth daily 90 tablet 1    hydrALAZINE (APRESOLINE) 100 MG tablet TAKE 1 TABLET BY MOUTH TWICE DAILY 180 tablet 1    Probiotic Product (PROBIOTIC-10 PO) Take by mouth daily as needed      spironolactone (ALDACTONE) 25 MG tablet Take 1 tablet by mouth daily 90 tablet 1    simvastatin (ZOCOR) 20 MG tablet Take 1 tablet by mouth nightly 90 tablet 3    valsartan (DIOVAN) 40 MG tablet Take 1 tablet by mouth in the morning. (Patient taking differently: Take 40 mg by mouth nightly) 90 tablet 3    metFORMIN (GLUCOPHAGE) 500 MG tablet Take 1 tablet by mouth in the morning and 1 tablet in the evening. Take with meals.  180 tablet 3    ascorbic acid (VITAMIN C) 250 MG tablet Take 500 mg by mouth      aspirin 81 MG EC tablet Take 81 mg by mouth      loratadine (CLARITIN) 10 MG tablet Take 10 mg by mouth daily      phenazopyridine (PYRIDIUM) 100 MG tablet Take 1 tablet by mouth 3 times daily as needed for Pain 30 tablet 2     No current facility-administered medications for this visit. Allergies   Allergen Reactions    Macadamia Nut Oil Anaphylaxis     Tongue and throat swelling with almonds. States that he recently noticed symptoms with eating peanuts.        Ace Inhibitors Other (See Comments)    Codeine Nausea And Vomiting     Patient Active Problem List    Diagnosis Date Noted    Palpitation 09/22/2022     Priority: Medium    Bladder stone 11/16/2022     Added automatically from request for surgery [id number]      Status post amputation of finger of right hand 07/27/2022     Middle finger due to a cyst      History of COVID-19 04/19/2022    Hypersomnia 12/06/2017    Obesity (BMI 30.0-34.9) 12/06/2017    AMY (obstructive sleep apnea) 12/06/2017    Coronary atherosclerosis of native coronary vessel 03/28/2016    Malignant neoplasm of dome of urinary bladder ([facility] [address] 06/27/2014    Microscopic hematuria 02/27/2014    Essential hypertension 02/27/2014     Sees Dr. Elza Garcia        Retention of urine, unspecified 02/27/2014    Calculus of kidney 02/27/2014    Urinary frequency 02/27/2014    BPH with obstruction/lower urinary tract symptoms 02/27/2014    Arthritis 02/07/2013    Colon polyps 02/07/2013    High cholesterol 02/07/2013    Diabetes mellitus ([facility] [address] 02/07/2013      Past Surgical History:   Procedure Laterality Date    AMPUTATION Right     third finger due to a tumor in finger    CLOSE CYSTOSTOMY  9/2012    several cysto w/ bladder biopsy; was cancer    CLOSE CYSTOSTOMY  07/2021    removed several bladder stones    COLONOSCOPY  2013    COLONOSCOPY N/A 2/14/2018    COLONOSCOPY / BMI=34 performed by Sarika Ramírez MD at [place] [address] N/A 11/29/2022    CYSTOSCOPY, BLADDER STONE REMOVAL performed by Joanna Adler [facility] ORTHOPEDIC SURGERY Bilateral 2000's    brian toe surgery (great toe), removal of arthritis from toes    TUMOR REMOVAL  1974    scalp     Family History   Problem Relation Age of Onset    Hypertension Sister     Hypertension Brother     Hypertension Brother     Stroke Mother     Diabetes Mother     Heart Disease Father     Coronary Art Dis Father     Stroke Father     Hypertension Mother     Cancer Father         prostate     Social History     Tobacco Use    Smoking status: Never    Smokeless tobacco: Never   Substance Use Topics    Alcohol use: No         ROS:    No obvious pertinent positives on review of systems except for what was outlined in the HPI today.       PHYSICAL EXAM:     BP (!) 158/88   Pulse 80   Ht 5' 11\" (1.803 m)   Wt 233 lb (105.7 kg)   BMI 32.50 kg/m²    General/Constitutional:   Alert and oriented x 3, no acute distress  HEENT:   normocephalic, atraumatic, moist mucous membranes  Neck:   No JVD or carotid bruits bilaterally  Cardiovascular:   regular rate and rhythm, no murmur/rub/gallop appreciated  Pulmonary:   clear to auscultation bilaterally, no respiratory distress  Abdomen:   soft, non-tender, non-distended  Ext:   No sig LE edema bilaterally  Skin:  warm and dry, no obvious rashes seen  Neuro:   no obvious sensory or motor deficits  Psychiatric:   normal mood and affect      Lab Results   Component Value Date/Time     10/10/2022 10:16 AM    K 3.8 10/10/2022 10:16 AM     10/10/2022 10:16 AM    CO2 29 10/10/2022 10:16 AM    BUN 17 10/10/2022 10:16 AM    CREATININE 1.00 10/10/2022 10:16 AM    GLUCOSE 133 10/10/2022 10:16 AM    CALCIUM 9.1 10/10/2022 10:16 AM        Lab Results   Component Value Date    WBC 5.4 07/01/2021    HGB 13.2 (L) 07/01/2021    HCT 42.6 07/01/2021    MCV 84.9 07/01/2021     (L) 07/01/2021       No results found for: TSHFT4, TSH    Lab Results   Component Value Date    LABA1C 7.0 (H) 07/19/2022     Lab Results   Component Value Date     07/19/2022       Lab Results   Component Value Date    CHOL 173 04/14/2022    CHOL 162 10/13/2021    CHOL 143 04/13/2021     Lab Results   Component Value Date    TRIG 158 (H) 04/14/2022    TRIG 108 10/13/2021    TRIG 131 04/13/2021     Lab Results   Component Value Date    HDL 51 04/14/2022    HDL 46 10/13/2021    HDL 49 04/13/2021     Lab Results   Component Value Date    LDLCALC 95 04/14/2022    LDLCALC 96 10/13/2021    LDLCALC 71 04/13/2021     Lab Results   Component Value Date    LABVLDL 30 07/10/2020    LABVLDL 21 01/27/2020    VLDL 27 04/14/2022    VLDL 20 10/13/2021    VLDL 23 04/13/2021     No results found for: CHOLHDLRATIO        I have Independently reviewed prior care notes, any ER records available, cardiac testing, labs and results with the patient and before seeing the patient today. Also independently reviewed outside records when available. ASSESSMENT:    Nanci Rucker was seen today for coronary artery disease. Diagnoses and all orders for this visit:    Atherosclerosis of native coronary artery of native heart without angina pectoris    Essential hypertension    Palpitation        PLAN:      1. HTN:   better now, white coat HTN. On low salt diet. On hyd 100 BID, diovan, aldactone. Felt tired on clonidine. Added norvasc 10.           2. HPL:   On zocor. Diet better now. 3. CAD: working on diabetes, better now. Diet and exercise better. ASA and statin. The patient has been instructed to call with any angina or equivalent as reviewed today. All questions were answered with the patient voicing complete understanding. 4. AMY: remain on CPAP. compliant there. Follow K and Cr. Labs in Jan.          Patient has been instructed and agrees to call our office with any issues or other concerns related to their cardiac condition(s) and/or complaint(s). Return in about 6 months (around 6/27/2023).        TK BEGUM,   12/27/2022

## 2023-01-09 ENCOUNTER — OFFICE VISIT (OUTPATIENT)
Dept: UROLOGY | Age: 68
End: 2023-01-09
Payer: MEDICARE

## 2023-01-09 DIAGNOSIS — C67.9 MALIGNANT NEOPLASM OF URINARY BLADDER, UNSPECIFIED SITE (HCC): ICD-10-CM

## 2023-01-09 DIAGNOSIS — N13.8 BENIGN PROSTATIC HYPERPLASIA WITH URINARY OBSTRUCTION: ICD-10-CM

## 2023-01-09 DIAGNOSIS — N21.0 CALCULUS IN BLADDER: Primary | ICD-10-CM

## 2023-01-09 DIAGNOSIS — R97.20 ELEVATED PROSTATE SPECIFIC ANTIGEN (PSA): ICD-10-CM

## 2023-01-09 DIAGNOSIS — N40.1 BENIGN PROSTATIC HYPERPLASIA WITH URINARY OBSTRUCTION: ICD-10-CM

## 2023-01-09 LAB
BILIRUBIN, URINE, POC: NEGATIVE
BLOOD URINE, POC: NEGATIVE
GLUCOSE URINE, POC: NEGATIVE
KETONES, URINE, POC: NEGATIVE
LEUKOCYTE ESTERASE, URINE, POC: ABNORMAL
NITRITE, URINE, POC: NEGATIVE
PH, URINE, POC: 7 (ref 4.6–8)
PROTEIN,URINE, POC: ABNORMAL
SPECIFIC GRAVITY, URINE, POC: 1.02 (ref 1–1.03)
URINALYSIS CLARITY, POC: ABNORMAL
URINALYSIS COLOR, POC: ABNORMAL
UROBILINOGEN, POC: 1

## 2023-01-09 PROCEDURE — 1036F TOBACCO NON-USER: CPT | Performed by: UROLOGY

## 2023-01-09 PROCEDURE — G8417 CALC BMI ABV UP PARAM F/U: HCPCS | Performed by: UROLOGY

## 2023-01-09 PROCEDURE — 81003 URINALYSIS AUTO W/O SCOPE: CPT | Performed by: UROLOGY

## 2023-01-09 PROCEDURE — 99214 OFFICE O/P EST MOD 30 MIN: CPT | Performed by: UROLOGY

## 2023-01-09 PROCEDURE — G8484 FLU IMMUNIZE NO ADMIN: HCPCS | Performed by: UROLOGY

## 2023-01-09 PROCEDURE — G8427 DOCREV CUR MEDS BY ELIG CLIN: HCPCS | Performed by: UROLOGY

## 2023-01-09 PROCEDURE — 3017F COLORECTAL CA SCREEN DOC REV: CPT | Performed by: UROLOGY

## 2023-01-09 PROCEDURE — 1123F ACP DISCUSS/DSCN MKR DOCD: CPT | Performed by: UROLOGY

## 2023-01-09 RX ORDER — TAMSULOSIN HYDROCHLORIDE 0.4 MG/1
0.4 CAPSULE ORAL DAILY
Qty: 90 CAPSULE | Refills: 3 | Status: SHIPPED | OUTPATIENT
Start: 2023-01-09

## 2023-01-09 NOTE — PROGRESS NOTES
[facility]  [address]  [address] [facility], [address] [place] [address] Orlando Hoyt. : 1955    Chief Complaint   Patient presents with    Follow-up     6 wks Calculus in bladder        HPI     Honorio Spicer. is a 75 y.o. male With hx of bladder cancer, elevated PSA. S/p TURBT 10-8-12, for stage T1 bladder cancer. Had a papillary tumor at the right dome and right lateral wall. Pathology shows papillary high-grade urothelial carcinoma with   focal superficial lamina propria invasion from the bladder dome   tumor. Muscularis propria was not identified. We also did a separate biopsy of the bladder dome adjacent to the   tumor. It shows high grade urothelial carcinoma with associated   superficial lamina propria invasion, muscularis propria not   identified. The right lateral wall tumor shows papillary high-grade   urothelial carcinoma, definitive features of invasion are not   identified, muscularis propria is not identified. We did a re-biopsy of the resection base. There is focally   necrotic tissue present, no carcinoma noted. Completed 6 week induction course of BCG in . Follow up cysto showed tumor on left dome, flat red area right   dome. S/P TURBT on 13. Papillary tumor left dome shows high grade   papillary tumor, no invasion. Right dome flat area shows focal   features of high grade urothelial carcinoma, Separate fragment of dysplastic urothelium. Small fragment of muscularis present,   uninvolved by tumor. Cystoscopy in 2013 shows BPH, 4+ trabeculation, no tumors. Atypical cytology with negative fish test.--S/P Cystoscopy with   bladder biopsy on 10/17/13. Pathology revealed urothelial atypia. Has fnished 6 weeks of BCG at end of . Continues on Flomax. Completed  BCG maintenance. Cytology in  showed atypical cells, but negative FISH. Had 3   week BCG in .    Negative cystoscopy in September 2014. Cytology showed atypical   Cells. Cysto in 4-15 showed flat red area right bladder . Negative FISH. Cystoscopy in July 2015 showed that the red flat area was   smaller. Cytology showed atypical cells. Finished BCG 3 in September 2015, and 4-16. Finished last BCG 10-28-16.negative cytology in 5-16. Cysto showed red edematous area right anterior bladder. Had MRI to evaluate the bladder mass on 12-17-16: Impression:   1. Small sessile mass in the right anterolateral urinary bladder wall  consistent with patient's known primary urothelial carcinoma, suspect this is a  T3 lesion. 2. No evidence of local perivesicular or pelvic metastatic disease, MR radha staging N0.  3. Other incidental findings such small urachal diverticulum as above. Had TUR of the bladder lesion of 12-22-16. Path:   DIAGNOSIS   BLADDER LESION: TISSUE CONSISTENT WITH BLADDER MUCOSA HAVING EDEMA AND CHRONIC INFLAMMATION OF LAMINA PROPRIA WITHOUT SIGNIFICANT LINING MUCOSA     Cytology in 4-17 showed rare atypical cells. PSA 1.8 in 2017. Cysto in 5-18 showed diverticulum at dome with surrounding cellulitis. Had pelvic MRI in 5-18: IMPRESSION:   1. The previously seen sessile lesion well evident at the right bladder is  significantly decreased and now at the right bladder wall there is only some  nonfocal mild bladder wall thickening which does not enhance-appearance at this  is clearly improved. 2. No evidence for metastatic disease in the pelvis. 3. Stable chronic changes including a small bladder diverticulum at the anterior  dome and prostate enlargement secondary to BPH again seen-these seem stable. He has started having more urgency since starting a diuretic. Started Tamsulosin in 2018. He stopped it, no voiding complaints now. Cysto in 12-19 showed small bladder stones. PSA 4.4 in 4-20. He has no voiding complaints. Cysto in 5-20 showed bladder stones were larger, no tumors.       MRI 5-28-20: Findings:  PROSTATE SIZE: The gland measures 6.9 x 4.6 x 4.5 cm. PERIPHERAL GLAND: Few patchy areas of decreased T2 signal throughout the  peripheral zone right greater than left without focal nodule, decreased ADC  signal, or early arterial enhancement. CENTRAL GLAND: There are T2 hyperintense nodules in the central gland consistent  with hypertrophy. CAPSULE: The prostatic capsule is maintained without evidence of metastatic  spread. PERINEURAL: The neurovascular bundles and posterolateral periprostatic fat are  normal without evidence of tumor extension. SEMINAL VESICLES: The seminal vesicles and ejaculatory ducts are normal.      LYMPH NODES: No pelvic lymph adenopathy. Limited imaging of the lower abdomen  shows no adenopathy or free fluid. BONES: No aggressive osseous lesion. BLADDER AND RECTUM: There is a small diverticulum at the bladder dome best  visualized on the coronal data set series 7 image 7. The bladder is otherwise  unremarkable. Rectum unremarkable. IMPRESSION  IMPRESSION:  1. No discernible lesion seen. No evidence of disease spread beyond the  prostatic capsule. 2. No lymphadenopathy. May need cysto and removal of bladder stones, possible MRI fusion biopsy. PSA 2.9 in 12-20. He has no hematuria or difficulty voiding . PSA 2.8 in 6-21. Cysto in 7-21 showed BPH,  Numerous bladder stones, 10-15, largest 1 cm. on 7-1-21 had cystolitholapaxy. bladder stones, struvite on analysis recommended that he acidify urine with Vit C 500 mg po   PSA 3.3 in 10-22. Cysto in 11-22 showed numerous small stones on floor of bladder. On 11-29-22 had cysto and removal of bladder stones. Stone analysis: Calculus is composed of a compact mass of random oriented to columnar   monoclinic needles of calcium oxalate monohydrate, indicating homogeneous   nucleation. Deposits of subcrystalline ammonium acid urate and   microcrystalline hydroxyl apatite covers the surface.   He has been having some urinary incontinence, wears pads.    Past Medical History:   Diagnosis Date    Arthritis     back-     Bladder cancer ([facility] [address]     Chronic pain     back--pain comes and goes    Colon polyps 2/7/2013    Coronary atherosclerosis of native coronary vessel 03/28/2016    no mi/ no stents---- followed by dr Bon Turner    COVID-19 vaccine series completed 03/29/2021    moderna    DDD (degenerative disc disease) 02/07/2013    lower back    Diabetes ([facility] [address]     pre-diabetic    GERD (gastroesophageal reflux disease)     occ med    History of kidney stones     x 1, passed on own    Hypertension     controlled by medication    Hypertrophy of prostate with urinary obstruction and other lower urinary tract symptoms (LUTS) 2/27/2014    Hypertrophy of prostate with urinary obstruction and other lower urinary tract symptoms (LUTS)     Malignant neoplasm of bladder, part unspecified dx- summer 2012    surg/ BCG tx---- followed by dr Cris Quiros    Morbid obesity ([facility] [address]     bmi 34    Prediabetes     diet controlled---- sqbs avg am- ---- hypo at 80's    Pure hypercholesterolemia     managed with medication     Sleep apnea     wears cpap at hs    Urinary frequency      Past Surgical History:   Procedure Laterality Date    AMPUTATION Right     third finger due to a tumor in finger    CLOSE CYSTOSTOMY  9/2012    several cysto w/ bladder biopsy; was cancer    CLOSE CYSTOSTOMY  07/2021    removed several bladder stones    COLONOSCOPY  2013    COLONOSCOPY N/A 2/14/2018    COLONOSCOPY / BMI=34 performed by Juli Kohli MD at [facility] ENDOSCOPY    CYSTOSCOPY N/A 11/29/2022    CYSTOSCOPY, BLADDER STONE REMOVAL performed by Agapito Dowd MD at [facility] 2000's    brian toe surgery (great toe), removal of arthritis from toes    TUMOR REMOVAL  1974    scalp     Current Outpatient Medications   Medication Sig Dispense Refill    tamsulosin (FLOMAX) 0.4 MG capsule Take 1 capsule by mouth daily 90 capsule 3    ONETOUCH ULTRA strip USE TO CHECK BLOOD SUGAR EVERY  strip 3    Multiple Vitamins-Minerals (MULTIVITAMIN MEN PO) Take by mouth daily      amLODIPine (NORVASC) 10 MG tablet Take 1 tablet by mouth daily 90 tablet 1    hydrALAZINE (APRESOLINE) 100 MG tablet TAKE 1 TABLET BY MOUTH TWICE DAILY 180 tablet 1    Probiotic Product (PROBIOTIC-10 PO) Take by mouth daily as needed      spironolactone (ALDACTONE) 25 MG tablet Take 1 tablet by mouth daily 90 tablet 1    simvastatin (ZOCOR) 20 MG tablet Take 1 tablet by mouth nightly 90 tablet 3    valsartan (DIOVAN) 40 MG tablet Take 1 tablet by mouth in the morning. (Patient taking differently: Take 40 mg by mouth nightly) 90 tablet 3    metFORMIN (GLUCOPHAGE) 500 MG tablet Take 1 tablet by mouth in the morning and 1 tablet in the evening. Take with meals. 180 tablet 3    ascorbic acid (VITAMIN C) 250 MG tablet Take 500 mg by mouth      aspirin 81 MG EC tablet Take 81 mg by mouth      loratadine (CLARITIN) 10 MG tablet Take 10 mg by mouth daily      phenazopyridine (PYRIDIUM) 100 MG tablet Take 1 tablet by mouth 3 times daily as needed for Pain (Patient not taking: Reported on 1/9/2023) 30 tablet 2     No current facility-administered medications for this visit. Allergies   Allergen Reactions    Macadamia Nut Oil Anaphylaxis     Tongue and throat swelling with almonds. States that he recently noticed symptoms with eating peanuts.        Ace Inhibitors Other (See Comments)    Codeine Nausea And Vomiting     Social History     Socioeconomic History    Marital status:      Spouse name: Not on file    Number of children: Not on file    Years of education: Not on file    Highest education level: Not on file   Occupational History    Not on file   Tobacco Use    Smoking status: Never    Smokeless tobacco: Never   Substance and Sexual Activity    Alcohol use: No    Drug use: No    Sexual activity: Not on file   Other Topics Concern    Not on file   Social History Narrative Not on file     Social Determinants of Health     Financial Resource Strain: Not on file   Food Insecurity: Not on file   Transportation Needs: Not on file   Physical Activity: Not on file   Stress: Not on file   Social Connections: Not on file   Intimate Partner Violence: Not on file   Housing Stability: Not on file     Family History   Problem Relation Age of Onset    Hypertension Sister     Hypertension Brother     Hypertension Brother     Stroke Mother     Diabetes Mother     Heart Disease Father     Coronary Art Dis Father     Stroke Father     Hypertension Mother     Cancer Father         prostate       ROS    There were no vitals taken for this visit.     Urinalysis  UA - Dipstick  Results for orders placed or performed in visit on 01/09/23   AMB POC URINALYSIS DIP STICK AUTO W/O MICRO   Result Value Ref Range    Color, Urine, POC      Clarity, Urine, POC      Glucose, Urine, POC Negative Negative    Bilirubin, Urine, POC Negative Negative    Ketones, Urine, POC Negative Negative    Specific Gravity, Urine, POC 1.020 1.001 - 1.035    Blood, Urine, POC Negative Negative    pH, Urine, POC 7.0 4.6 - 8.0    Protein, Urine, POC Trace Negative    Urobilinogen, POC 1.0     Nitrate, Urine, POC Negative Negative    Leukocyte Esterase, Urine, POC Small Negative   Results for orders placed or performed in visit on 11/04/22   AMB POC URINALYSIS DIP STICK AUTO W/O MICRO   Result Value Ref Range    Color (UA POC)      Clarity (UA POC)      Glucose, Urine,  Negative    Bilirubin, Urine, POC Negative Negative    KETONES, Urine, POC Trace Negative    Specific Gravity, Urine, POC 1.030 1.001 - 1.035    Blood (UA POC) Negative Negative    pH, Urine, POC 5.5 4.6 - 8.0    Protein, Urine, POC 30 Negative    Urobilinogen, POC 0.2 mg/dL     Nitrite, Urine, POC Negative Negative    Leukocyte Esterase, Urine, POC Negative Negative       UA - Micro  WBC - 5-7  RBC - 0  Bacteria - 0  Epith - 0    Physical Exam    Assessment and Plan    ICD-10-CM    1. Calculus in bladder  N21.0 AMB POC URINALYSIS DIP STICK AUTO W/O MICRO     Kidney Stone, Urine/Saturation     Uric Acid     PTH, intact and calcium     Basic Metabolic Panel      2. Malignant neoplasm of urinary bladder, unspecified site ([facility] [address]  C67.9       3. Elevated prostate specific antigen (PSA)  R97.20       4. Benign prostatic hyperplasia with urinary obstruction  N40.1 tamsulosin (FLOMAX) 0.4 MG capsule    N13.8           Orders Placed This Encounter   Procedures    Kidney Stone, Urine/Saturation     Standing Status:   Future     Standing Expiration Date:   1/9/2024    Uric Acid     Standing Status:   Future     Standing Expiration Date:   1/9/2024    PTH, intact and calcium     Standing Status:   Future     Standing Expiration Date:   3/5/3080    Basic Metabolic Panel     Standing Status:   Future     Standing Expiration Date:   1/9/2024    AMB POC URINALYSIS DIP STICK AUTO W/O MICRO     Bladder stones were calcium oxalate, will get metabolic workup. Having more LUTS, will start tamsulosin. Discussed possible side effects. PSA and cysto in late 2023. Follow-up and Dispositions    Return in about 3 months (around 4/9/2023) for PVR on return.

## 2023-01-12 ENCOUNTER — NURSE ONLY (OUTPATIENT)
Dept: UROLOGY | Age: 68
End: 2023-01-12

## 2023-01-12 DIAGNOSIS — N21.0 CALCULUS IN BLADDER: ICD-10-CM

## 2023-01-13 ENCOUNTER — TELEPHONE (OUTPATIENT)
Dept: FAMILY MEDICINE CLINIC | Facility: CLINIC | Age: 68
End: 2023-01-13

## 2023-01-13 DIAGNOSIS — E11.9 TYPE 2 DIABETES MELLITUS WITHOUT COMPLICATION, WITHOUT LONG-TERM CURRENT USE OF INSULIN (HCC): ICD-10-CM

## 2023-01-13 DIAGNOSIS — I10 ESSENTIAL HYPERTENSION: Primary | ICD-10-CM

## 2023-01-13 DIAGNOSIS — E78.00 HIGH CHOLESTEROL: ICD-10-CM

## 2023-01-13 LAB
ANION GAP SERPL CALC-SCNC: 6 MMOL/L (ref 2–11)
BUN SERPL-MCNC: 17 MG/DL (ref 8–23)
CALCIUM SERPL-MCNC: 9.7 MG/DL (ref 8.3–10.4)
CHLORIDE SERPL-SCNC: 110 MMOL/L (ref 101–110)
CO2 SERPL-SCNC: 25 MMOL/L (ref 21–32)
CREAT SERPL-MCNC: 1 MG/DL (ref 0.8–1.5)
GLUCOSE SERPL-MCNC: 128 MG/DL (ref 65–100)
POTASSIUM SERPL-SCNC: 3.5 MMOL/L (ref 3.5–5.1)
SODIUM SERPL-SCNC: 141 MMOL/L (ref 133–143)
URATE SERPL-MCNC: 4.1 MG/DL (ref 2.6–6)

## 2023-01-17 LAB
CALCIUM SERPL-MCNC: 8.7 MG/DL (ref 8.6–10.2)
PTH-INTACT SERPL-MCNC: NORMAL PG/ML (ref 15–65)

## 2023-01-20 ENCOUNTER — NURSE ONLY (OUTPATIENT)
Dept: FAMILY MEDICINE CLINIC | Facility: CLINIC | Age: 68
End: 2023-01-20

## 2023-01-20 DIAGNOSIS — E11.9 TYPE 2 DIABETES MELLITUS WITHOUT COMPLICATION, WITHOUT LONG-TERM CURRENT USE OF INSULIN (HCC): ICD-10-CM

## 2023-01-20 DIAGNOSIS — E78.00 HIGH CHOLESTEROL: ICD-10-CM

## 2023-01-20 DIAGNOSIS — I10 ESSENTIAL HYPERTENSION: ICD-10-CM

## 2023-01-20 LAB
BASOPHILS # BLD: 0 K/UL (ref 0–0.2)
BASOPHILS NFR BLD: 1 % (ref 0–2)
DIFFERENTIAL METHOD BLD: ABNORMAL
EOSINOPHIL # BLD: 0.1 K/UL (ref 0–0.8)
EOSINOPHIL NFR BLD: 2 % (ref 0.5–7.8)
ERYTHROCYTE [DISTWIDTH] IN BLOOD BY AUTOMATED COUNT: 14.2 % (ref 11.9–14.6)
HCT VFR BLD AUTO: 37.2 % (ref 41.1–50.3)
HGB BLD-MCNC: 11.7 G/DL (ref 13.6–17.2)
IMM GRANULOCYTES # BLD AUTO: 0 K/UL (ref 0–0.5)
IMM GRANULOCYTES NFR BLD AUTO: 0 % (ref 0–5)
LYMPHOCYTES # BLD: 2.5 K/UL (ref 0.5–4.6)
LYMPHOCYTES NFR BLD: 38 % (ref 13–44)
MCH RBC QN AUTO: 26.8 PG (ref 26.1–32.9)
MCHC RBC AUTO-ENTMCNC: 31.5 G/DL (ref 31.4–35)
MCV RBC AUTO: 85.1 FL (ref 82–102)
MONOCYTES # BLD: 0.5 K/UL (ref 0.1–1.3)
MONOCYTES NFR BLD: 7 % (ref 4–12)
NEUTS SEG # BLD: 3.4 K/UL (ref 1.7–8.2)
NEUTS SEG NFR BLD: 52 % (ref 43–78)
NRBC # BLD: 0 K/UL (ref 0–0.2)
PLATELET # BLD AUTO: 198 K/UL (ref 150–450)
PMV BLD AUTO: 11.7 FL (ref 9.4–12.3)
RBC # BLD AUTO: 4.37 M/UL (ref 4.23–5.6)
WBC # BLD AUTO: 6.5 K/UL (ref 4.3–11.1)

## 2023-01-21 LAB
ALBUMIN SERPL-MCNC: 3.8 G/DL (ref 3.2–4.6)
ALBUMIN/GLOB SERPL: 1.2 (ref 0.4–1.6)
ALP SERPL-CCNC: 77 U/L (ref 50–136)
ALT SERPL-CCNC: 34 U/L (ref 12–65)
ANION GAP SERPL CALC-SCNC: 6 MMOL/L (ref 2–11)
AST SERPL-CCNC: 19 U/L (ref 15–37)
BILIRUB SERPL-MCNC: 0.9 MG/DL (ref 0.2–1.1)
BUN SERPL-MCNC: 23 MG/DL (ref 8–23)
CALCIUM SERPL-MCNC: 9.1 MG/DL (ref 8.3–10.4)
CHLORIDE SERPL-SCNC: 110 MMOL/L (ref 101–110)
CHOLEST SERPL-MCNC: 137 MG/DL
CO2 SERPL-SCNC: 27 MMOL/L (ref 21–32)
CREAT SERPL-MCNC: 1 MG/DL (ref 0.8–1.5)
EST. AVERAGE GLUCOSE BLD GHB EST-MCNC: 189 MG/DL
GLOBULIN SER CALC-MCNC: 3.3 G/DL (ref 2.8–4.5)
GLUCOSE SERPL-MCNC: 151 MG/DL (ref 65–100)
HBA1C MFR BLD: 8.2 % (ref 4.8–5.6)
HDLC SERPL-MCNC: 47 MG/DL (ref 40–60)
HDLC SERPL: 2.9
LDLC SERPL CALC-MCNC: 68.8 MG/DL
POTASSIUM SERPL-SCNC: 3.6 MMOL/L (ref 3.5–5.1)
PROT SERPL-MCNC: 7.1 G/DL (ref 6.3–8.2)
SODIUM SERPL-SCNC: 143 MMOL/L (ref 133–143)
TRIGL SERPL-MCNC: 106 MG/DL (ref 35–150)
VLDLC SERPL CALC-MCNC: 21.2 MG/DL (ref 6–23)

## 2023-01-27 ENCOUNTER — OFFICE VISIT (OUTPATIENT)
Dept: FAMILY MEDICINE CLINIC | Facility: CLINIC | Age: 68
End: 2023-01-27
Payer: COMMERCIAL

## 2023-01-27 VITALS
BODY MASS INDEX: 32.42 KG/M2 | HEART RATE: 76 BPM | RESPIRATION RATE: 16 BRPM | SYSTOLIC BLOOD PRESSURE: 157 MMHG | HEIGHT: 71 IN | DIASTOLIC BLOOD PRESSURE: 80 MMHG | WEIGHT: 231.6 LBS | OXYGEN SATURATION: 99 % | TEMPERATURE: 97.3 F

## 2023-01-27 DIAGNOSIS — E11.9 TYPE 2 DIABETES MELLITUS WITHOUT COMPLICATION, WITHOUT LONG-TERM CURRENT USE OF INSULIN (HCC): Primary | ICD-10-CM

## 2023-01-27 DIAGNOSIS — I10 ESSENTIAL HYPERTENSION: ICD-10-CM

## 2023-01-27 DIAGNOSIS — R00.2 PALPITATION: ICD-10-CM

## 2023-01-27 DIAGNOSIS — Z23 NEED FOR TD VACCINE: ICD-10-CM

## 2023-01-27 DIAGNOSIS — E78.00 HIGH CHOLESTEROL: ICD-10-CM

## 2023-01-27 PROCEDURE — 99214 OFFICE O/P EST MOD 30 MIN: CPT | Performed by: FAMILY MEDICINE

## 2023-01-27 PROCEDURE — 3077F SYST BP >= 140 MM HG: CPT | Performed by: FAMILY MEDICINE

## 2023-01-27 PROCEDURE — 90471 IMMUNIZATION ADMIN: CPT | Performed by: FAMILY MEDICINE

## 2023-01-27 PROCEDURE — 1123F ACP DISCUSS/DSCN MKR DOCD: CPT | Performed by: FAMILY MEDICINE

## 2023-01-27 PROCEDURE — 3017F COLORECTAL CA SCREEN DOC REV: CPT | Performed by: FAMILY MEDICINE

## 2023-01-27 PROCEDURE — G8484 FLU IMMUNIZE NO ADMIN: HCPCS | Performed by: FAMILY MEDICINE

## 2023-01-27 PROCEDURE — 1036F TOBACCO NON-USER: CPT | Performed by: FAMILY MEDICINE

## 2023-01-27 PROCEDURE — G8417 CALC BMI ABV UP PARAM F/U: HCPCS | Performed by: FAMILY MEDICINE

## 2023-01-27 PROCEDURE — G8427 DOCREV CUR MEDS BY ELIG CLIN: HCPCS | Performed by: FAMILY MEDICINE

## 2023-01-27 PROCEDURE — 3052F HG A1C>EQUAL 8.0%<EQUAL 9.0%: CPT | Performed by: FAMILY MEDICINE

## 2023-01-27 PROCEDURE — 90714 TD VACC NO PRESV 7 YRS+ IM: CPT | Performed by: FAMILY MEDICINE

## 2023-01-27 PROCEDURE — 3079F DIAST BP 80-89 MM HG: CPT | Performed by: FAMILY MEDICINE

## 2023-01-27 PROCEDURE — 2022F DILAT RTA XM EVC RTNOPTHY: CPT | Performed by: FAMILY MEDICINE

## 2023-01-27 RX ORDER — HYDRALAZINE HYDROCHLORIDE 100 MG/1
TABLET, FILM COATED ORAL
Qty: 180 TABLET | Refills: 3 | Status: SHIPPED | OUTPATIENT
Start: 2023-01-27

## 2023-01-27 ASSESSMENT — PATIENT HEALTH QUESTIONNAIRE - PHQ9
SUM OF ALL RESPONSES TO PHQ QUESTIONS 1-9: 0
2. FEELING DOWN, DEPRESSED OR HOPELESS: 0
SUM OF ALL RESPONSES TO PHQ QUESTIONS 1-9: 0
SUM OF ALL RESPONSES TO PHQ9 QUESTIONS 1 & 2: 0
1. LITTLE INTEREST OR PLEASURE IN DOING THINGS: 0

## 2023-01-27 NOTE — PROGRESS NOTES
[address] [facility] [place]  [facility] HospitalRadhika [facility]ir[facility]  Phone: [phone] Fax [phone]  Warren Chand MD  1/27/2023         Mr. Elizabeth Anderson  is a 75y.o.  year old  male patient who comes in to check on diabetes, hypertension, and high cholesterol. Checks blood sugars once a week. Sugars have been running better and he has really worked on his diet. He did have bladder surgery back in the fall and his sugars were high then so he went back on 2 metformin a day and it has been helping. Last eye exam was last year. Feet have no problems. Did  have a flu shot this year. Did have a pneumonia shot pneumovax 2016 and prevnar 2015 . Did have a tetanus shot 2010. Has  been working on diet and exercise. Blood pressure has been running better. Mr. Elizabeth Anderson  has  has a past medical history of Arthritis, Bladder cancer ([address] Chronic pain, Colon polyps, Coronary atherosclerosis of native coronary vessel, COVID-19 vaccine series completed, DDD (degenerative disc disease), Diabetes ([address] GERD (gastroesophageal reflux disease), History of kidney stones, Hypertension, Hypertrophy of prostate with urinary obstruction and other lower urinary tract symptoms (LUTS), Hypertrophy of prostate with urinary obstruction and other lower urinary tract symptoms (LUTS), Malignant neoplasm of bladder, part unspecified, Morbid obesity ([address] Prediabetes, Pure hypercholesterolemia, Sleep apnea, and Urinary frequency. Mr. Elizabeth Anderson  has  has a past surgical history that includes amputation (Right); close cystostomy (9/2012); close cystostomy (07/2021); tumor removal (1974); Colonoscopy (2013); Colonoscopy (N/A, 2/14/2018); orthopedic surgery (Bilateral, 2000's); Cystoscopy (N/A, 11/29/2022); and Urological Surgery.     Mr. Elizabeth Anderson   Current Outpatient Medications   Medication Sig Dispense Refill    hydrALAZINE (APRESOLINE) 100 MG tablet TAKE 1 TABLET BY MOUTH TWICE DAILY 180 tablet 3    metFORMIN (GLUCOPHAGE) 1000 MG tablet Take 1 tablet by mouth daily (with breakfast) 90 tablet 3    tamsulosin (FLOMAX) 0.4 MG capsule Take 1 capsule by mouth daily 90 capsule 3    ONETOUCH ULTRA strip USE TO CHECK BLOOD SUGAR EVERY  strip 3    Multiple Vitamins-Minerals (MULTIVITAMIN MEN PO) Take by mouth daily      amLODIPine (NORVASC) 10 MG tablet Take 1 tablet by mouth daily 90 tablet 1    Probiotic Product (PROBIOTIC-10 PO) Take by mouth daily as needed      spironolactone (ALDACTONE) 25 MG tablet Take 1 tablet by mouth daily 90 tablet 1    simvastatin (ZOCOR) 20 MG tablet Take 1 tablet by mouth nightly 90 tablet 3    valsartan (DIOVAN) 40 MG tablet Take 1 tablet by mouth in the morning. (Patient taking differently: Take 40 mg by mouth nightly) 90 tablet 3    ascorbic acid (VITAMIN C) 250 MG tablet Take 500 mg by mouth      aspirin 81 MG EC tablet Take 81 mg by mouth      loratadine (CLARITIN) 10 MG tablet Take 10 mg by mouth daily       No current facility-administered medications for this visit. Mr. Mchugh Revering History   Problem Relation Age of Onset    Hypertension Sister     Hypertension Brother     Hypertension Brother     Stroke Mother     Diabetes Mother     Heart Disease Father     Coronary Art Dis Father     Stroke Father     Hypertension Mother     Cancer Father         prostate       Mr. Emmanuel    Social History     Socioeconomic History    Marital status:      Spouse name: Not on file    Number of children: Not on file    Years of education: Not on file    Highest education level: Not on file   Occupational History    Not on file   Tobacco Use    Smoking status: Never    Smokeless tobacco: Never   Substance and Sexual Activity    Alcohol use: No    Drug use: No    Sexual activity: Not on file   Other Topics Concern    Not on file   Social History Narrative    Not on file     Social Determinants of Health     Financial Resource Strain: Not on file   Food Insecurity: Not on file Transportation Needs: Not on file   Physical Activity: Not on file   Stress: Not on file   Social Connections: Not on file   Intimate Partner Violence: Not on file   Housing Stability: Not on file       Mr. Emmanuel  has the following allergies: Allergies   Allergen Reactions    Macadamia Nut Oil Anaphylaxis     Tongue and throat swelling with almonds. States that he recently noticed symptoms with eating peanuts. Ace Inhibitors Other (See Comments)    Codeine Nausea And Vomiting       BP (!) 157/80   Pulse 76   Temp 97.3 °F (36.3 °C)   Resp 16   Ht 5' 11\" (1.803 m)   Wt 231 lb 9.6 oz (105.1 kg)   SpO2 99%   BMI 32.30 kg/m²     HEENT: Normocephalic, atraumatic, pupils equal and reactive to light. Tympanic membranes intact without erythema or edema. Oropharynx clear. Neck: Supple, no masses or thyromegaly. Lungs: clear to auscultation bilaterally. CV: regular rate and rhythm, without murmurs, rubs, or gallops. Abdomen: soft, nontender, nondistended, no masses. No CVAT tenderness. Ext: No lower extremity edema,    Diabetic foot exam:   Left Foot:   Visual Exam: normal   Pulse DP: 2+ (normal)   Filament test: normal sensation   Vibratory Sensation: normal  Right Foot:   Visual Exam: normal   Pulse DP: 2+ (normal)   Filament test: normal sensation   Vibratory Sensation: normal      Lab work gone over with the patient. No results found for this visit on 01/27/23. Stanley Fowler was seen today for diabetes. Diagnoses and all orders for this visit:    Type 2 diabetes mellitus without complication, without long-term current use of insulin ([facility])  -     Discontinue: metFORMIN (GLUCOPHAGE) 1000 MG tablet; Take 1 tablet by mouth 2 times daily (with meals)  -     metFORMIN (GLUCOPHAGE) 1000 MG tablet;  Take 1 tablet by mouth daily (with breakfast)    High cholesterol  -     hydrALAZINE (APRESOLINE) 100 MG tablet; TAKE 1 TABLET BY MOUTH TWICE DAILY    Essential hypertension    Palpitation    Need for Td vaccine  -     Td, TDVAX, (age 9 yrs+), IM    Definitely increase his metformin to twice a day. Continue to work with cardiology on blood pressure. Patient counseled on diet and exercise.     Alona Chau MD

## 2023-01-28 LAB
AMMONIA 24H UR-MCNC: ABNORMAL UG/DL
AMMONIA 24H UR-SRATE: 97 MEQ/24 HR
CA H2 PHOS DIHYD CRY URNS QL MICRO: 3.53 RATIO (ref 0–3)
CALCIUM 24H UR-MCNC: 12.1 MG/DL
CALCIUM 24H UR-MRATE: 193.6 MG/24 HR (ref 0–320)
CAOX INDEX 24H UR-RTO: 5.25 RATIO (ref 0–6)
CHLORIDE 24H UR-SCNC: 116 MMOL/L
CHLORIDE 24H UR-SRATE: 186 MMOL/24 HR (ref 52–264)
CITRATE 24H UR-MCNC: 510 MG/L
CITRATE 24H UR-MRATE: 816 MG/24 HR (ref 320–1240)
COLLECT DURATION TIME UR: 24 HR
CREAT 24H UR-MCNC: 116.4 MG/DL
CREAT 24H UR-MRATE: 1862.4 MG/24 HR (ref 1000–2000)
CYSTINE 24H UR-MRATE: 349.17 MG/24 HR (ref 2.1–58)
CYSTINE UR-MCNC: 218.23 MG/L
LABORATORY COMMENT REPORT: ABNORMAL
MAGNESIUM 24H UR-MRATE: 40 MG/24 HR (ref 12–293)
MAGNESIUM UR-MCNC: 2.5 MG/DL
NA URATE 24H SATFR UR: 1.69 RATIO (ref 0–4)
OSMOLALITY UR: 642 MOSMOL/KG (ref 300–900)
OXALATE 24H UR-MRATE: 43 MG/24 HR (ref 7–44)
OXALATE UR-MCNC: 27 MG/L
PH 24H UR: 7.7 (ref 4.5–8)
PHOSPHATE 24H UR-MCNC: 60.4 MG/DL
PHOSPHATE 24H UR-MRATE: 966.4 MG/24 HR (ref 390–1425)
POTASSIUM 24H UR-SRATE: 46.2 MMOL/24 HR (ref 20–116)
POTASSIUM UR-SCNC: 28.9 MMOL/L
SODIUM 24H UR-SCNC: 132 MMOL/L
SODIUM 24H UR-SRATE: 211 MMOL/24 HR (ref 58–337)
SPECIMEN VOL 24H UR: 1600 ML/24 HR (ref 800–1800)
SPECIMEN VOL 24H UR: 1600 ML/24 HR (ref 800–1800)
SPECIMEN VOL ?TM UR: 1600 ML
SULFATE 24H UR-SCNC: ABNORMAL MEQ/L
TRI-PHOS 24H SATFR UR: 2.67 RATIO (ref 0–1)
URATE 24H UR-MCNC: 10.7 MG/DL
URATE 24H UR-MRATE: 171 MG/24 HR (ref 182–937)
URATE DIHYD CRY STONE QL IR: 0.01 RATIO (ref 0–1.2)

## 2023-04-04 RX ORDER — AMLODIPINE BESYLATE 10 MG/1
10 TABLET ORAL DAILY
Qty: 90 TABLET | Refills: 0 | Status: SHIPPED | OUTPATIENT
Start: 2023-04-04

## 2023-04-04 RX ORDER — SPIRONOLACTONE 25 MG/1
25 TABLET ORAL DAILY
Qty: 90 TABLET | Refills: 0 | Status: SHIPPED | OUTPATIENT
Start: 2023-04-04

## 2023-04-12 ENCOUNTER — TELEPHONE (OUTPATIENT)
Dept: UROLOGY | Age: 68
End: 2023-04-12

## 2023-04-20 NOTE — TELEPHONE ENCOUNTER
Procedures: Procedure(s):   CYSTOSCOPY MONOPOLAR TRANSURETHRAL RESECTION PROSTATE   Date: 6/13/2023   Time: 0800   Location: [facility] MAIN OR 01 CYSTO     Scheduled.

## 2023-06-01 ENCOUNTER — HOSPITAL ENCOUNTER (OUTPATIENT)
Dept: LAB | Age: 68
Discharge: HOME | End: 2023-06-01
Payer: MEDICARE

## 2023-06-01 DIAGNOSIS — N39.0 URINARY TRACT INFECTION WITH HEMATURIA, SITE UNSPECIFIED: ICD-10-CM

## 2023-06-01 DIAGNOSIS — R31.9 URINARY TRACT INFECTION WITH HEMATURIA, SITE UNSPECIFIED: ICD-10-CM

## 2023-06-01 DIAGNOSIS — N40.1 BPH WITH URINARY OBSTRUCTION: ICD-10-CM

## 2023-06-01 DIAGNOSIS — N13.8 BPH WITH URINARY OBSTRUCTION: ICD-10-CM

## 2023-06-01 LAB
APPEARANCE UR: ABNORMAL
BACTERIA URNS QL MICRO: ABNORMAL /HPF
BASOPHILS # BLD: 0 K/UL (ref 0–0.2)
BASOPHILS NFR BLD: 1 % (ref 0–2)
BILIRUB UR QL: NEGATIVE
CASTS URNS QL MICRO: ABNORMAL /LPF
COLOR UR: ABNORMAL
DIFFERENTIAL METHOD BLD: ABNORMAL
EOSINOPHIL # BLD: 0.1 K/UL (ref 0–0.8)
EOSINOPHIL NFR BLD: 2 % (ref 0.5–7.8)
EPI CELLS #/AREA URNS HPF: ABNORMAL /HPF
ERYTHROCYTE [DISTWIDTH] IN BLOOD BY AUTOMATED COUNT: 14 % (ref 11.9–14.6)
GLUCOSE UR STRIP.AUTO-MCNC: NEGATIVE MG/DL
HCT VFR BLD AUTO: 40.1 % (ref 41.1–50.3)
HGB BLD-MCNC: 12.3 G/DL (ref 13.6–17.2)
HGB UR QL STRIP: ABNORMAL
IMM GRANULOCYTES # BLD AUTO: 0 K/UL (ref 0–0.5)
IMM GRANULOCYTES NFR BLD AUTO: 0 % (ref 0–5)
KETONES UR QL STRIP.AUTO: NEGATIVE MG/DL
LEUKOCYTE ESTERASE UR QL STRIP.AUTO: ABNORMAL
LYMPHOCYTES # BLD: 2.2 K/UL (ref 0.5–4.6)
LYMPHOCYTES NFR BLD: 41 % (ref 13–44)
MCH RBC QN AUTO: 25.9 PG (ref 26.1–32.9)
MCHC RBC AUTO-ENTMCNC: 30.7 G/DL (ref 31.4–35)
MCV RBC AUTO: 84.6 FL (ref 82–102)
MONOCYTES # BLD: 0.4 K/UL (ref 0.1–1.3)
MONOCYTES NFR BLD: 7 % (ref 4–12)
NEUTS SEG # BLD: 2.8 K/UL (ref 1.7–8.2)
NEUTS SEG NFR BLD: 51 % (ref 43–78)
NITRITE UR QL STRIP.AUTO: NEGATIVE
NRBC # BLD: 0 K/UL (ref 0–0.2)
PH UR STRIP: 5.5 (ref 5–9)
PLATELET # BLD AUTO: 168 K/UL (ref 150–450)
PMV BLD AUTO: 11.6 FL (ref 9.4–12.3)
PROT UR STRIP-MCNC: NEGATIVE MG/DL
RBC # BLD AUTO: 4.74 M/UL (ref 4.23–5.6)
RBC #/AREA URNS HPF: ABNORMAL /HPF
SP GR UR REFRACTOMETRY: 1.01 (ref 1–1.02)
UROBILINOGEN UR QL STRIP.AUTO: 0.2 EU/DL (ref 0.2–1)
WBC # BLD AUTO: 5.5 K/UL (ref 4.3–11.1)
WBC URNS QL MICRO: >100 /HPF

## 2023-06-01 PROCEDURE — 87086 URINE CULTURE/COLONY COUNT: CPT

## 2023-06-01 PROCEDURE — 36415 COLL VENOUS BLD VENIPUNCTURE: CPT

## 2023-06-01 PROCEDURE — 85025 COMPLETE CBC W/AUTO DIFF WBC: CPT

## 2023-06-01 PROCEDURE — 81001 URINALYSIS AUTO W/SCOPE: CPT

## 2023-06-03 LAB
BACTERIA SPEC CULT: NORMAL
BACTERIA SPEC CULT: NORMAL
SERVICE CMNT-IMP: NORMAL

## 2023-06-13 ENCOUNTER — HOSPITAL ENCOUNTER (INPATIENT)
Age: 68
LOS: 1 days | Discharge: HOME | DRG: 713 | End: 2023-06-15
Attending: UROLOGY | Admitting: UROLOGY
Payer: MEDICARE

## 2023-06-13 DIAGNOSIS — N21.0 BLADDER STONE: Primary | ICD-10-CM

## 2023-06-13 LAB
ABO + RH BLD: NORMAL
BLOOD GROUP ANTIBODIES SERPL: NORMAL
CREAT BLD-MCNC: 1.03 MG/DL (ref 0.8–1.5)
GLUCOSE BLD STRIP.AUTO-MCNC: 141 MG/DL (ref 65–100)
POTASSIUM BLD-SCNC: 3.8 MMOL/L (ref 3.5–5.1)
SERVICE CMNT-IMP: ABNORMAL
SPECIMEN EXP DATE BLD: NORMAL

## 2023-06-13 PROCEDURE — G0378 HOSPITAL OBSERVATION PER HR: HCPCS

## 2023-06-13 PROCEDURE — 2700000000 HC OXYGEN THERAPY PER DAY

## 2023-06-13 PROCEDURE — 2580000003 HC RX 258: Performed by: UROLOGY

## 2023-06-13 PROCEDURE — 86900 BLOOD TYPING SEROLOGIC ABO: CPT

## 2023-06-13 PROCEDURE — 3700000001 HC ADD 15 MINUTES (ANESTHESIA): Performed by: UROLOGY

## 2023-06-13 PROCEDURE — 86850 RBC ANTIBODY SCREEN: CPT

## 2023-06-13 PROCEDURE — 6370000000 HC RX 637 (ALT 250 FOR IP): Performed by: UROLOGY

## 2023-06-13 PROCEDURE — 3700000000 HC ANESTHESIA ATTENDED CARE: Performed by: UROLOGY

## 2023-06-13 PROCEDURE — 3600000004 HC SURGERY LEVEL 4 BASE: Performed by: UROLOGY

## 2023-06-13 PROCEDURE — 82962 GLUCOSE BLOOD TEST: CPT

## 2023-06-13 PROCEDURE — 88300 SURGICAL PATH GROSS: CPT

## 2023-06-13 PROCEDURE — 82565 ASSAY OF CREATININE: CPT

## 2023-06-13 PROCEDURE — 84132 ASSAY OF SERUM POTASSIUM: CPT

## 2023-06-13 PROCEDURE — 2709999900 HC NON-CHARGEABLE SUPPLY: Performed by: UROLOGY

## 2023-06-13 PROCEDURE — 3600000014 HC SURGERY LEVEL 4 ADDTL 15MIN: Performed by: UROLOGY

## 2023-06-13 PROCEDURE — 86901 BLOOD TYPING SEROLOGIC RH(D): CPT

## 2023-06-13 PROCEDURE — 0VB08ZZ EXCISION OF PROSTATE, VIA NATURAL OR ARTIFICIAL OPENING ENDOSCOPIC: ICD-10-PCS | Performed by: UROLOGY

## 2023-06-13 PROCEDURE — 0TCB8ZZ EXTIRPATION OF MATTER FROM BLADDER, VIA NATURAL OR ARTIFICIAL OPENING ENDOSCOPIC: ICD-10-PCS | Performed by: UROLOGY

## 2023-06-13 PROCEDURE — 6360000002 HC RX W HCPCS: Performed by: ANESTHESIOLOGY

## 2023-06-13 PROCEDURE — 7100000000 HC PACU RECOVERY - FIRST 15 MIN: Performed by: UROLOGY

## 2023-06-13 PROCEDURE — 7100000001 HC PACU RECOVERY - ADDTL 15 MIN: Performed by: UROLOGY

## 2023-06-13 PROCEDURE — 2720000010 HC SURG SUPPLY STERILE: Performed by: UROLOGY

## 2023-06-13 PROCEDURE — 94760 N-INVAS EAR/PLS OXIMETRY 1: CPT

## 2023-06-13 PROCEDURE — 82355 CALCULUS ANALYSIS QUAL: CPT

## 2023-06-13 PROCEDURE — 88305 TISSUE EXAM BY PATHOLOGIST: CPT

## 2023-06-13 RX ORDER — SODIUM CHLORIDE 0.9 % (FLUSH) 0.9 %
5-40 SYRINGE (ML) INJECTION PRN
Status: DISCONTINUED | OUTPATIENT
Start: 2023-06-13 | End: 2023-06-15 | Stop reason: HOSPADM

## 2023-06-13 RX ORDER — SODIUM CHLORIDE 0.9 % (FLUSH) 0.9 %
5-40 SYRINGE (ML) INJECTION EVERY 12 HOURS SCHEDULED
Status: DISCONTINUED | OUTPATIENT
Start: 2023-06-13 | End: 2023-06-13 | Stop reason: HOSPADM

## 2023-06-13 RX ORDER — LIDOCAINE HYDROCHLORIDE 10 MG/ML
1 INJECTION, SOLUTION INFILTRATION; PERINEURAL
Status: DISCONTINUED | OUTPATIENT
Start: 2023-06-13 | End: 2023-06-13 | Stop reason: HOSPADM

## 2023-06-13 RX ORDER — AMLODIPINE BESYLATE 10 MG/1
10 TABLET ORAL DAILY
Status: DISCONTINUED | OUTPATIENT
Start: 2023-06-14 | End: 2023-06-15 | Stop reason: HOSPADM

## 2023-06-13 RX ORDER — FENTANYL CITRATE 50 UG/ML
100 INJECTION, SOLUTION INTRAMUSCULAR; INTRAVENOUS
Status: DISCONTINUED | OUTPATIENT
Start: 2023-06-13 | End: 2023-06-13 | Stop reason: HOSPADM

## 2023-06-13 RX ORDER — SODIUM CHLORIDE 0.9 % (FLUSH) 0.9 %
5-40 SYRINGE (ML) INJECTION EVERY 12 HOURS SCHEDULED
Status: DISCONTINUED | OUTPATIENT
Start: 2023-06-13 | End: 2023-06-15 | Stop reason: HOSPADM

## 2023-06-13 RX ORDER — SODIUM CHLORIDE 9 MG/ML
INJECTION, SOLUTION INTRAVENOUS CONTINUOUS
Status: DISCONTINUED | OUTPATIENT
Start: 2023-06-13 | End: 2023-06-14

## 2023-06-13 RX ORDER — ASPIRIN 81 MG/1
81 TABLET ORAL DAILY
Status: DISCONTINUED | OUTPATIENT
Start: 2023-06-13 | End: 2023-06-15 | Stop reason: HOSPADM

## 2023-06-13 RX ORDER — OXYCODONE HYDROCHLORIDE 5 MG/1
5 TABLET ORAL EVERY 4 HOURS PRN
Status: DISCONTINUED | OUTPATIENT
Start: 2023-06-13 | End: 2023-06-15 | Stop reason: HOSPADM

## 2023-06-13 RX ORDER — HYDROMORPHONE HYDROCHLORIDE 2 MG/ML
0.5 INJECTION, SOLUTION INTRAMUSCULAR; INTRAVENOUS; SUBCUTANEOUS EVERY 5 MIN PRN
Status: DISCONTINUED | OUTPATIENT
Start: 2023-06-13 | End: 2023-06-13 | Stop reason: HOSPADM

## 2023-06-13 RX ORDER — SPIRONOLACTONE 25 MG/1
25 TABLET ORAL AS NEEDED
Status: DISCONTINUED | OUTPATIENT
Start: 2023-06-13 | End: 2023-06-15 | Stop reason: HOSPADM

## 2023-06-13 RX ORDER — HYDROMORPHONE HYDROCHLORIDE 1 MG/ML
0.5 INJECTION, SOLUTION INTRAMUSCULAR; INTRAVENOUS; SUBCUTANEOUS
Status: DISCONTINUED | OUTPATIENT
Start: 2023-06-13 | End: 2023-06-15 | Stop reason: HOSPADM

## 2023-06-13 RX ORDER — MIDAZOLAM HYDROCHLORIDE 2 MG/2ML
2 INJECTION, SOLUTION INTRAMUSCULAR; INTRAVENOUS
Status: DISCONTINUED | OUTPATIENT
Start: 2023-06-13 | End: 2023-06-13 | Stop reason: HOSPADM

## 2023-06-13 RX ORDER — ONDANSETRON 2 MG/ML
4 INJECTION INTRAMUSCULAR; INTRAVENOUS EVERY 6 HOURS PRN
Status: DISCONTINUED | OUTPATIENT
Start: 2023-06-13 | End: 2023-06-15 | Stop reason: HOSPADM

## 2023-06-13 RX ORDER — SODIUM CHLORIDE, SODIUM LACTATE, POTASSIUM CHLORIDE, CALCIUM CHLORIDE 600; 310; 30; 20 MG/100ML; MG/100ML; MG/100ML; MG/100ML
INJECTION, SOLUTION INTRAVENOUS CONTINUOUS
Status: DISCONTINUED | OUTPATIENT
Start: 2023-06-13 | End: 2023-06-13 | Stop reason: HOSPADM

## 2023-06-13 RX ORDER — HYDRALAZINE HYDROCHLORIDE 50 MG/1
100 TABLET, FILM COATED ORAL 2 TIMES DAILY
Status: DISCONTINUED | OUTPATIENT
Start: 2023-06-13 | End: 2023-06-15 | Stop reason: HOSPADM

## 2023-06-13 RX ORDER — VALSARTAN 40 MG/1
40 TABLET ORAL DAILY
Status: DISCONTINUED | OUTPATIENT
Start: 2023-06-14 | End: 2023-06-15 | Stop reason: HOSPADM

## 2023-06-13 RX ORDER — SODIUM CHLORIDE 9 MG/ML
INJECTION, SOLUTION INTRAVENOUS PRN
Status: DISCONTINUED | OUTPATIENT
Start: 2023-06-13 | End: 2023-06-15 | Stop reason: HOSPADM

## 2023-06-13 RX ORDER — SODIUM CHLORIDE 0.9 % (FLUSH) 0.9 %
5-40 SYRINGE (ML) INJECTION PRN
Status: DISCONTINUED | OUTPATIENT
Start: 2023-06-13 | End: 2023-06-13 | Stop reason: HOSPADM

## 2023-06-13 RX ORDER — ACETAMINOPHEN 325 MG/1
650 TABLET ORAL EVERY 4 HOURS PRN
Status: DISCONTINUED | OUTPATIENT
Start: 2023-06-13 | End: 2023-06-15 | Stop reason: HOSPADM

## 2023-06-13 RX ORDER — SODIUM CHLORIDE 9 MG/ML
INJECTION, SOLUTION INTRAVENOUS PRN
Status: DISCONTINUED | OUTPATIENT
Start: 2023-06-13 | End: 2023-06-13 | Stop reason: HOSPADM

## 2023-06-13 RX ORDER — VALSARTAN 40 MG/1
40 TABLET ORAL DAILY
Status: DISCONTINUED | OUTPATIENT
Start: 2023-06-13 | End: 2023-06-13

## 2023-06-13 RX ORDER — OXYCODONE HYDROCHLORIDE 5 MG/1
5 TABLET ORAL
Status: DISCONTINUED | OUTPATIENT
Start: 2023-06-13 | End: 2023-06-13 | Stop reason: HOSPADM

## 2023-06-13 RX ORDER — PROCHLORPERAZINE EDISYLATE 5 MG/ML
5 INJECTION INTRAMUSCULAR; INTRAVENOUS
Status: DISCONTINUED | OUTPATIENT
Start: 2023-06-13 | End: 2023-06-13 | Stop reason: HOSPADM

## 2023-06-13 RX ADMIN — OXYCODONE HYDROCHLORIDE 5 MG: 5 TABLET ORAL at 13:17

## 2023-06-13 RX ADMIN — HYDROMORPHONE HYDROCHLORIDE 0.5 MG: 2 INJECTION, SOLUTION INTRAMUSCULAR; INTRAVENOUS; SUBCUTANEOUS at 11:08

## 2023-06-13 RX ADMIN — HYDRALAZINE HYDROCHLORIDE 100 MG: 50 TABLET, FILM COATED ORAL at 21:52

## 2023-06-13 RX ADMIN — METFORMIN HYDROCHLORIDE 1000 MG: 500 TABLET ORAL at 17:50

## 2023-06-13 RX ADMIN — SODIUM CHLORIDE, PRESERVATIVE FREE 10 ML: 5 INJECTION INTRAVENOUS at 21:52

## 2023-06-13 RX ADMIN — ASPIRIN 81 MG: 81 TABLET ORAL at 13:12

## 2023-06-13 RX ADMIN — SODIUM CHLORIDE: 9 INJECTION, SOLUTION INTRAVENOUS at 17:49

## 2023-06-13 RX ADMIN — OXYCODONE HYDROCHLORIDE 5 MG: 5 TABLET ORAL at 17:50

## 2023-06-13 ASSESSMENT — PAIN - FUNCTIONAL ASSESSMENT: PAIN_FUNCTIONAL_ASSESSMENT: 0-10

## 2023-06-13 ASSESSMENT — PAIN SCALES - GENERAL
PAINLEVEL_OUTOF10: 7
PAINLEVEL_OUTOF10: 5
PAINLEVEL_OUTOF10: 3
PAINLEVEL_OUTOF10: 1
PAINLEVEL_OUTOF10: 5
PAINLEVEL_OUTOF10: 1

## 2023-06-13 ASSESSMENT — PAIN DESCRIPTION - LOCATION
LOCATION: GROIN
LOCATION: GROIN
LOCATION: ABDOMEN

## 2023-06-13 ASSESSMENT — PAIN DESCRIPTION - DESCRIPTORS: DESCRIPTORS: BURNING

## 2023-06-13 ASSESSMENT — PAIN DESCRIPTION - ORIENTATION: ORIENTATION: MID;LOWER

## 2023-06-13 NOTE — OP NOTE
[address] [facility]  OPERATIVE REPORT    Name:  Mary Marrufo  MR#:  [id number]  :  1955  ACCOUNT #:  [de-identified]  DATE OF SERVICE:  2023    PREOPERATIVE DIAGNOSIS:  Benign prostatic hyperplasia with urinary obstruction. POSTOPERATIVE DIAGNOSES:  Benign prostatic hyperplasia with urinary obstruction with the addition of bladder calculi. PROCEDURE PERFORMED:  1. Transurethral resection of prostate. 2.  Cystolitholapaxy for stones measuring greater than 4 cm. SURGEON:  Jordan Winston MD    ASSISTANT:  None. ANESTHESIA:  General.    COMPLICATIONS:  None. SPECIMENS REMOVED:  1. TURP chips. 2.  Bladder stones. IMPLANTS:  None. ESTIMATED BLOOD LOSS:  About 75 mL. FINDINGS AT SURGERY:  Large obstructing prostate with lateral lobe hypertrophy. Next, several yellowish bladder stones with at least two stones measuring 2 cm in diameter. PROCEDURE:  The patient was given a general anesthetic, placed in the dorsal lithotomy position. He was prepped and draped in sterile fashion. I attempted to pass a 28-Citizen of [place] continuous flow resectoscope sheath, but this met resistance in the proximal penile urethra. I was able to dilate the urethra up to 30-Citizen of [place] using [employer] sounds. I then passed the 28-Citizen of [place] monopolar resectoscope. There was some mild stricture area in the proximal penile urethra which had been well dilated. I passed the scope up into the bladder. A 30-degree lens with video camera was used with the monopolar resectoscope. The bladder shows several yellowish stones. At least two of the stones were 2 cm in diameter and there were about two more about 1 cm or less in diameter, for a total aggregate size approximately 5 cm. The bladder showed no evidence of tumor. There was a moderate diverticulum at the dome with normal-appearing mucosa. Prostate shows lateral lobe hypertrophy with large lateral lobes and also a moderate median lobe.   I began

## 2023-06-13 NOTE — H&P
Saurabh Lea. : 1955          Chief Complaint   Patient presents with    Follow-up       Malignant neoplasm of bladder         HPI      Saurabh Wade is a 75 y.o. male   With hx of bladder cancer, elevated PSA. S/p TURBT 10-8-12, for stage T1 bladder cancer. completed BCG maintenance. Had a papillary tumor at the right dome and right lateral wall. Pathology shows papillary high-grade urothelial carcinoma with   focal superficial lamina propria invasion from the bladder dome   tumor. Muscularis propria was not identified. We also did a separate biopsy of the bladder dome adjacent to the   tumor. It shows high grade urothelial carcinoma with associated   superficial lamina propria invasion, muscularis propria not   identified. The right lateral wall tumor shows papillary high-grade   urothelial carcinoma, definitive features of invasion are not   identified, muscularis propria is not identified. We did a re-biopsy of the resection base. There is focally   necrotic tissue present, no carcinoma noted. Completed 6 week induction course of BCG in . Follow up cysto showed tumor on left dome, flat red area right   dome. S/P TURBT on 13. Papillary tumor left dome shows high grade   papillary tumor, no invasion. Right dome flat area shows focal   features of high grade urothelial carcinoma, Separate fragment of dysplastic urothelium. Small fragment of muscularis present,   uninvolved by tumor. Cystoscopy in 2013 shows BPH, 4+ trabeculation, no tumors. Atypical cytology with negative fish test.--S/P Cystoscopy with   bladder biopsy on 10/17/13. Pathology revealed urothelial atypia. Has fnished 6 weeks of BCG at end of . Continues on Flomax. Completed  BCG maintenance. Cytology in  showed atypical cells, but negative FISH. Had 3   week BCG in . Negative cystoscopy in 2014. Cytology showed atypical   Cells.   Cysto in 4-15

## 2023-06-13 NOTE — BRIEF OP NOTE
Brief Postoperative Note      Patient: Flora Pitts YOB: 1955  MRN: [id number]    Date of Procedure: 6/13/2023    Pre-Op Diagnosis Codes:     * Enlarged prostate with lower urinary tract symptoms (LUTS) [N40.1]    Post-Op Diagnosis: Same       Procedure(s):  CYSTOSCOPY MONOPOLAR TRANSURETHRAL RESECTION PROSTATE    Surgeon(s):  Bianca Ramirez MD    Assistant:  * No surgical staff found *    Anesthesia: General    Estimated Blood Loss (mL): less than 622     Complications: None    Specimens:   ID Type Source Tests Collected by Time Destination   A : Prostate chips Tissue Prostate SURGICAL PATHOLOGY Bianca Ramirez MD 6/13/2023 0901    B : bladder stones Tissue Bladder SURGICAL PATHOLOGY Bianca Ramirez MD 6/13/2023 0361        Implants:  * No implants in log *      Drains: * No LDAs found *    Findings: several stones in bladder.        Electronically signed by Linda Coombs MD on 6/13/2023 at 9:43 AM

## 2023-06-13 NOTE — PERIOP NOTE
TRANSFER - OUT REPORT:    Verbal report given to 6th floor  on [facility].  being transferred to [address] [place] [address] for routine post-op       Report consisted of patients Situation, Background, Assessment and   Recommendations(SBAR). Information from the following report(s) Nurse Handoff Report, Adult Overview, Surgery Report, MAR, and Cardiac Rhythm NSR  was reviewed with the receiving nurse. Lines:   Peripheral IV 06/13/23 Left;Posterior Hand (Active)   Site Assessment Clean, dry & intact 06/13/23 1033   Line Status Flushed 06/13/23 1033   Line Care Connections checked and tightened 06/13/23 1033   Phlebitis Assessment No symptoms 06/13/23 1033   Infiltration Assessment 0 06/13/23 1033   Alcohol Cap Used No 06/13/23 1033   Dressing Status Clean, dry & intact 06/13/23 1033   Dressing Type Transparent 06/13/23 1033        Opportunity for questions and clarification was provided. Patient transported with:   O2 @ 2 liters    VTE prophylaxis orders have been written for [facility]. .    Patient and family given floor number and nurses name. Family updated re: pt status after security code verified.

## 2023-06-13 NOTE — CARE COORDINATION
Met with Mr. Jolie Dougherty and his wife at bedside for initial CM assessment. Patient is alert and oriented x4. Demographics and PCP verified. Mr. Jolie Dougherty works full time at [employer]. He was independent with mobility and ADLs at most recent baseline. He used no DME and received no services. He is an active  in the community. Mr. Jolie Dougherty expects to discharge back home with his wife and anticipates no needs at discharge at this time. CM will continue to follow. 06/13/23 1500   Service Assessment   Patient Orientation Alert and Oriented;Person;Place;Situation   Cognition Alert   History Provided By Patient   Primary Caregiver Self   Support Systems Spouse/Significant Other;Children;Family Members;Friends/Neighbors   Patient's Healthcare Decision Maker is: Legal Next of Kin   PCP Verified by CM Yes   Last Visit to PCP Within last 3 months   Prior Functional Level Independent in ADLs/IADLs   Current Functional Level Independent in ADLs/IADLs   Can patient return to prior living arrangement Yes   Ability to make needs known: Good   Family able to assist with home care needs: Yes   Would you like for me to discuss the discharge plan with any other family members/significant others, and if so, who?  Yes   Financial Resources Medicare   Community Resources None   Social/Functional History   Lives With Spouse   Type of Home House   Home Layout Two level   Home Access Stairs to enter with rails   Entrance Stairs - Number of Steps 6   Entrance Stairs - Rails Both   Bathroom Shower/Tub Walk-in shower   Bathroom Toilet Standard   Bathroom Equipment None   Bathroom Accessibility Accessible   Home Equipment None   ADL Assistance Independent   Homemaking Assistance Independent   Ambulation Assistance Independent   Active  Yes   Mode of Transportation Car   Occupation Full time employment   Type of Occupation [employer]   Discharge Planning   Type of Residence House   Living Arrangements Spouse/Significant Other   Current

## 2023-06-14 LAB
ANION GAP SERPL CALC-SCNC: 5 MMOL/L (ref 2–11)
BUN SERPL-MCNC: 17 MG/DL (ref 8–23)
CALCIUM SERPL-MCNC: 8.1 MG/DL (ref 8.3–10.4)
CHLORIDE SERPL-SCNC: 112 MMOL/L (ref 101–110)
CO2 SERPL-SCNC: 26 MMOL/L (ref 21–32)
CREAT SERPL-MCNC: 1 MG/DL (ref 0.8–1.5)
GLUCOSE SERPL-MCNC: 142 MG/DL (ref 65–100)
HCT VFR BLD AUTO: 31.8 % (ref 41.1–50.3)
HGB BLD-MCNC: 10.3 G/DL (ref 13.6–17.2)
POTASSIUM SERPL-SCNC: 3.6 MMOL/L (ref 3.5–5.1)
SODIUM SERPL-SCNC: 143 MMOL/L (ref 133–143)

## 2023-06-14 PROCEDURE — 99024 POSTOP FOLLOW-UP VISIT: CPT | Performed by: NURSE PRACTITIONER

## 2023-06-14 PROCEDURE — 85014 HEMATOCRIT: CPT

## 2023-06-14 PROCEDURE — 80048 BASIC METABOLIC PNL TOTAL CA: CPT

## 2023-06-14 PROCEDURE — 85018 HEMOGLOBIN: CPT

## 2023-06-14 PROCEDURE — 36415 COLL VENOUS BLD VENIPUNCTURE: CPT

## 2023-06-14 PROCEDURE — G0378 HOSPITAL OBSERVATION PER HR: HCPCS

## 2023-06-14 PROCEDURE — 6370000000 HC RX 637 (ALT 250 FOR IP): Performed by: UROLOGY

## 2023-06-14 PROCEDURE — 2580000003 HC RX 258: Performed by: UROLOGY

## 2023-06-14 RX ADMIN — METFORMIN HYDROCHLORIDE 1000 MG: 500 TABLET ORAL at 09:03

## 2023-06-14 RX ADMIN — SODIUM CHLORIDE, PRESERVATIVE FREE 10 ML: 5 INJECTION INTRAVENOUS at 09:03

## 2023-06-14 RX ADMIN — VALSARTAN 40 MG: 40 TABLET, FILM COATED ORAL at 09:03

## 2023-06-14 RX ADMIN — SODIUM CHLORIDE, PRESERVATIVE FREE 10 ML: 5 INJECTION INTRAVENOUS at 21:51

## 2023-06-14 RX ADMIN — ASPIRIN 81 MG: 81 TABLET ORAL at 09:03

## 2023-06-14 RX ADMIN — HYDRALAZINE HYDROCHLORIDE 100 MG: 50 TABLET, FILM COATED ORAL at 09:04

## 2023-06-14 RX ADMIN — HYDRALAZINE HYDROCHLORIDE 100 MG: 50 TABLET, FILM COATED ORAL at 21:53

## 2023-06-14 RX ADMIN — AMLODIPINE BESYLATE 10 MG: 10 TABLET ORAL at 09:04

## 2023-06-14 RX ADMIN — METFORMIN HYDROCHLORIDE 1000 MG: 500 TABLET ORAL at 17:45

## 2023-06-14 RX ADMIN — ACETAMINOPHEN 650 MG: 325 TABLET ORAL at 06:10

## 2023-06-14 ASSESSMENT — PAIN DESCRIPTION - ORIENTATION: ORIENTATION: MID

## 2023-06-14 ASSESSMENT — PAIN SCALES - GENERAL
PAINLEVEL_OUTOF10: 4
PAINLEVEL_OUTOF10: 8

## 2023-06-14 ASSESSMENT — PAIN - FUNCTIONAL ASSESSMENT
PAIN_FUNCTIONAL_ASSESSMENT: ACTIVITIES ARE NOT PREVENTED
PAIN_FUNCTIONAL_ASSESSMENT: ACTIVITIES ARE NOT PREVENTED

## 2023-06-14 ASSESSMENT — PAIN DESCRIPTION - LOCATION
LOCATION: GROIN
LOCATION: NECK

## 2023-06-14 ASSESSMENT — PAIN DESCRIPTION - DESCRIPTORS
DESCRIPTORS: ACHING;DISCOMFORT
DESCRIPTORS: DISCOMFORT

## 2023-06-14 NOTE — PROGRESS NOTES
Admit Date: 6/13/2023      Subjective: Delphine Moore. is POD  1 Procedure(s):  CYSTOSCOPY MONOPOLAR TRANSURETHRAL RESECTION PROSTATE  CYSTOLITHOPAXY    No new complaints. Ambulating halls. Objective:     Patient Vitals for the past 8 hrs:   BP Temp Temp src Pulse Resp SpO2   06/14/23 0753 131/79 99.3 °F (37.4 °C) Oral 94 20 96 %   06/14/23 0456 (!) 155/74 99 °F (37.2 °C) Oral 97 18 97 %     No intake/output data recorded. 06/12 1901 - 06/14 0700  In: 900 [I.V.:900]  Out: 1800 [Urine:1750]    Physical Exam:  GENERAL ASSESSMENT: alert, oriented to person, place and time, no acute distress and no anxiety, depression or agitation  Chest: normal work of breathing  CVS exam: normal rate, regular rhythm, normal S1, S2, no murmurs, rubs, clicks or gallops. ABDOMEN: not done  Neurological exam reveals alert, oriented, normal speech, no focal findings or movement disorder noted. FEMALE GENITOURINARY EXAM: not done  MALE GENITAL EXAM: not done    Data Review   Recent Results (from the past 24 hour(s))   Basic Metabolic Panel    Collection Time: 06/14/23  6:49 AM   Result Value Ref Range    Sodium 143 133 - 143 mmol/L    Potassium 3.6 3.5 - 5.1 mmol/L    Chloride 112 (H) 101 - 110 mmol/L    CO2 26 21 - 32 mmol/L    Anion Gap 5 2 - 11 mmol/L    Glucose 142 (H) 65 - 100 mg/dL    BUN 17 8 - 23 MG/DL    Creatinine 1.00 0.8 - 1.5 MG/DL    Est, Glom Filt Rate >60 >60 ml/min/1.73m2    Calcium 8.1 (L) 8.3 - 10.4 MG/DL   Hemoglobin and Hematocrit    Collection Time: 06/14/23  6:49 AM   Result Value Ref Range    Hemoglobin 10.3 (L) 13.6 - 17.2 g/dL    Hematocrit 31.8 (L) 41.1 - 50.3 %       Assessment:     Principal Problem:    BPH with obstruction/lower urinary tract symptoms  Resolved Problems:    * No resolved hospital problems. *    Flannery with CBI clear UOP. VSS.     Pre-Op Diagnosis: Enlarged prostate with lower urinary tract symptoms (LUTS) [N40.1]    Post-Op Diagnosis:  * No post-op diagnosis entered

## 2023-06-14 NOTE — CARE COORDINATION
Patient chart reviewed for continued stay. Per MD documentation, patient should discharge home tomorrow. It is anticipated he will have no needs. Will continue to follow patient's plan of care and assist further with supportive care needs as appropriate.

## 2023-06-15 VITALS
HEART RATE: 101 BPM | WEIGHT: 233 LBS | SYSTOLIC BLOOD PRESSURE: 137 MMHG | BODY MASS INDEX: 32.62 KG/M2 | OXYGEN SATURATION: 99 % | RESPIRATION RATE: 16 BRPM | DIASTOLIC BLOOD PRESSURE: 64 MMHG | HEIGHT: 71 IN | TEMPERATURE: 98.4 F

## 2023-06-15 PROCEDURE — G0378 HOSPITAL OBSERVATION PER HR: HCPCS

## 2023-06-15 PROCEDURE — 2580000003 HC RX 258: Performed by: UROLOGY

## 2023-06-15 PROCEDURE — 1100000000 HC RM PRIVATE

## 2023-06-15 PROCEDURE — 6370000000 HC RX 637 (ALT 250 FOR IP): Performed by: UROLOGY

## 2023-06-15 PROCEDURE — 99024 POSTOP FOLLOW-UP VISIT: CPT | Performed by: NURSE PRACTITIONER

## 2023-06-15 RX ORDER — POTASSIUM CITRATE 10 MEQ/1
10 TABLET, EXTENDED RELEASE ORAL 2 TIMES DAILY
Qty: 180 TABLET | Refills: 3 | Status: SHIPPED | OUTPATIENT
Start: 2023-06-15

## 2023-06-15 RX ORDER — OXYCODONE HYDROCHLORIDE 5 MG/1
5 TABLET ORAL EVERY 4 HOURS PRN
Qty: 12 TABLET | Refills: 0 | Status: SHIPPED | OUTPATIENT
Start: 2023-06-15 | End: 2023-06-18

## 2023-06-15 RX ADMIN — VALSARTAN 40 MG: 40 TABLET, FILM COATED ORAL at 09:46

## 2023-06-15 RX ADMIN — SODIUM CHLORIDE, PRESERVATIVE FREE 10 ML: 5 INJECTION INTRAVENOUS at 09:45

## 2023-06-15 RX ADMIN — HYDRALAZINE HYDROCHLORIDE 100 MG: 50 TABLET, FILM COATED ORAL at 09:46

## 2023-06-15 RX ADMIN — AMLODIPINE BESYLATE 10 MG: 10 TABLET ORAL at 09:46

## 2023-06-15 RX ADMIN — METFORMIN HYDROCHLORIDE 1000 MG: 500 TABLET ORAL at 09:46

## 2023-06-15 RX ADMIN — ASPIRIN 81 MG: 81 TABLET ORAL at 09:46

## 2023-06-15 NOTE — PROGRESS NOTES
Patient has home cpap machine. She states that he doesn't need helping with taking mask on and off. Will continue to monitor throughout the night.

## 2023-06-15 NOTE — PROGRESS NOTES
Discharge instructions provided, Pt verbalizes understanding. IV removed. Pt to be transported out with staff after getting ready.

## 2023-06-15 NOTE — CARE COORDINATION
Pt is for discharge home today with family and no needs/supportive care orders recieved for CM at this time.

## 2023-06-15 NOTE — DISCHARGE SUMMARY
SPECIMEN  Chloride                                      Date: 06/14/2023  Value: 112 (H)     Ref range: 101 - 110 mmol/L   Status: Final  CO2                                           Date: 06/14/2023  Value: 26          Ref range: 21 - 32 mmol/L     Status: Final  Anion Gap                                     Date: 06/14/2023  Value: 5           Ref range: 2 - 11 mmol/L      Status: Final  Glucose                                       Date: 06/14/2023  Value: 142 (H)     Ref range: 65 - 100 mg/dL     Status: Final  BUN                                           Date: 06/14/2023  Value: 17          Ref range: 8 - 23 MG/DL       Status: Final  Creatinine                                    Date: 06/14/2023  Value: 1.00        Ref range: 0.8 - 1.5 MG/DL    Status: Final  Est, Glom Filt Rate                           Date: 06/14/2023  Value: >60         Ref range: >60 ml/min/1.73m2  Status: Final                Comment:      Pediatric calculator link: [employer].at. org/professionals/kdoqi/gfr_calculatorped       These results are not intended for use in patients <25years of age. eGFR results are calculated without a race factor using  the 2021 CKD-EPI equation. Careful clinical correlation is recommended, particularly when comparing to results calculated using previous equations. The CKD-EPI equation is less accurate in patients with extremes of muscle mass, extra-renal metabolism of creatinine, excessive creatine ingestion, or following therapy that affects renal tubular secretion.     Calcium                                       Date: 06/14/2023  Value: 8.1 (L)     Ref range: 8.3 - 10.4 MG/DL   Status: Final  Hemoglobin                                    Date: 06/14/2023  Value: 10.3 (L)    Ref range: 13.6 - 17.2 g/dL   Status: Final  Hematocrit                                    Date: 06/14/2023  Value: 31.8 (L)    Ref range: 41.1 - 50.3 %      Status: Final  ------------    Radiology last 7 days:  No

## 2023-06-15 NOTE — PROGRESS NOTES
Admit Date: 6/13/2023      Subjective: Zena Kawasaki. is POD 2 Procedure(s):  CYSTOSCOPY MONOPOLAR TRANSURETHRAL RESECTION PROSTATE  CYSTOLITHOPAXY    No new complaints. Objective:     Patient Vitals for the past 8 hrs:   BP Temp Temp src Pulse Resp SpO2   06/15/23 0728 132/75 97.7 °F (36.5 °C) Oral (!) 107 18 96 %   06/15/23 0516 (!) 158/78 97.7 °F (36.5 °C) Oral 96 18 95 %     No intake/output data recorded. 06/13 1901 - 06/15 0700  In: -   Out: 7000 [Urine:7000]    Physical Exam:  GENERAL ASSESSMENT: alert, oriented to person, place and time, no acute distress and no anxiety, depression or agitation  Chest: normal work of breathing  CVS exam: normal rate, regular rhythm, normal S1, S2, no murmurs, rubs, clicks or gallops. ABDOMEN: not done  Neurological exam reveals alert, oriented, normal speech, no focal findings or movement disorder noted. FEMALE GENITOURINARY EXAM: not done  MALE GENITAL EXAM: not done    Data Review No results found for this or any previous visit (from the past 24 hour(s)). Assessment:     Principal Problem:    BPH with obstruction/lower urinary tract symptoms  Resolved Problems:    * No resolved hospital problems. *    Pack draining pink UOP. VSS. Pre-Op Diagnosis: Enlarged prostate with lower urinary tract symptoms (LUTS) [N40.1]    Post-Op Diagnosis:  * No post-op diagnosis entered *    Procedures: Procedure(s):  S/P CYSTOSCOPY MONOPOLAR TRANSURETHRAL RESECTION PROSTATE  CYSTOLITHOPAXY      Plan:   Remove pack this am.  Home today after voiding trial.  Will schedule F/U with NP in 2 weeks.       Signed By: VINOD Montes     Denise 15, 2023      [facility]rology

## 2023-06-23 ENCOUNTER — CARE COORDINATION (OUTPATIENT)
Dept: CARE COORDINATION | Facility: CLINIC | Age: 68
End: 2023-06-23

## 2023-06-23 NOTE — CARE COORDINATION
Care Transitions Outreach Attempt    Call within 2 business days of discharge: Yes   Attempted to reach patient for transitions of care follow up. Unable to reach patient. Will attempt to contact next business day. Patient: Alisha Frazier Patient : 1955 MRN: [id number]    Last Discharge [address]       Date Complaint Diagnosis Description Type Department Provider    23  Bladder stone Admission (Discharged) [id number] Ingris Jennings MD              Was this an external facility discharge?  No Discharge Facility: [address] [place] [address]    Noted following upcoming appointments from discharge chart review:   [facility] follow up appointment(s):   Future Appointments   Date Time Provider Vickie Christine   2023  8:00 AM Deepthi Chua DO UCDG [facility] AMB   2023  9:15 AM ABIEL Vasquez - CNP [id number] [facility] AMB   2023  8:20 AM Shirley Rosas MD PSCD [facility] AMB   2023  8:15 AM FPA MISCELLANEOUS FPA [facility] AMB   2023  8:30 AM Raegan Christian MD FPA [facility] AMB     Non-[facility] follow up appointment(s): n/a

## 2023-06-26 ENCOUNTER — CARE COORDINATION (OUTPATIENT)
Dept: CARE COORDINATION | Facility: CLINIC | Age: 68
End: 2023-06-26

## 2023-06-28 ENCOUNTER — OFFICE VISIT (OUTPATIENT)
Age: 68
End: 2023-06-28
Payer: MEDICARE

## 2023-06-28 VITALS
WEIGHT: 229 LBS | SYSTOLIC BLOOD PRESSURE: 140 MMHG | BODY MASS INDEX: 32.06 KG/M2 | HEART RATE: 88 BPM | HEIGHT: 71 IN | DIASTOLIC BLOOD PRESSURE: 70 MMHG

## 2023-06-28 DIAGNOSIS — I25.10 ATHEROSCLEROSIS OF NATIVE CORONARY ARTERY OF NATIVE HEART WITHOUT ANGINA PECTORIS: Primary | ICD-10-CM

## 2023-06-28 DIAGNOSIS — I10 ESSENTIAL HYPERTENSION: ICD-10-CM

## 2023-06-28 DIAGNOSIS — G47.33 OSA (OBSTRUCTIVE SLEEP APNEA): ICD-10-CM

## 2023-06-28 DIAGNOSIS — R00.2 PALPITATION: ICD-10-CM

## 2023-06-28 PROCEDURE — 1111F DSCHRG MED/CURRENT MED MERGE: CPT | Performed by: INTERNAL MEDICINE

## 2023-06-28 PROCEDURE — 3077F SYST BP >= 140 MM HG: CPT | Performed by: INTERNAL MEDICINE

## 2023-06-28 PROCEDURE — 1036F TOBACCO NON-USER: CPT | Performed by: INTERNAL MEDICINE

## 2023-06-28 PROCEDURE — 99214 OFFICE O/P EST MOD 30 MIN: CPT | Performed by: INTERNAL MEDICINE

## 2023-06-28 PROCEDURE — G8428 CUR MEDS NOT DOCUMENT: HCPCS | Performed by: INTERNAL MEDICINE

## 2023-06-28 PROCEDURE — 3017F COLORECTAL CA SCREEN DOC REV: CPT | Performed by: INTERNAL MEDICINE

## 2023-06-28 PROCEDURE — 1123F ACP DISCUSS/DSCN MKR DOCD: CPT | Performed by: INTERNAL MEDICINE

## 2023-06-28 PROCEDURE — G8417 CALC BMI ABV UP PARAM F/U: HCPCS | Performed by: INTERNAL MEDICINE

## 2023-06-28 PROCEDURE — 3078F DIAST BP <80 MM HG: CPT | Performed by: INTERNAL MEDICINE

## 2023-06-29 ENCOUNTER — OFFICE VISIT (OUTPATIENT)
Dept: UROLOGY | Age: 68
End: 2023-06-29
Payer: MEDICARE

## 2023-06-29 DIAGNOSIS — N13.8 BPH WITH URINARY OBSTRUCTION: Primary | ICD-10-CM

## 2023-06-29 DIAGNOSIS — N40.1 BPH WITH URINARY OBSTRUCTION: Primary | ICD-10-CM

## 2023-06-29 LAB
BILIRUBIN, URINE, POC: NEGATIVE
BLOOD URINE, POC: NORMAL
GLUCOSE URINE, POC: NEGATIVE
KETONES, URINE, POC: NEGATIVE
LEUKOCYTE ESTERASE, URINE, POC: NORMAL
NITRITE, URINE, POC: NEGATIVE
PH, URINE, POC: 6 (ref 4.6–8)
PROTEIN,URINE, POC: 300
SPECIFIC GRAVITY, URINE, POC: 1.02 (ref 1–1.03)
URINALYSIS CLARITY, POC: NORMAL
URINALYSIS COLOR, POC: NORMAL
UROBILINOGEN, POC: NORMAL

## 2023-06-29 PROCEDURE — 81003 URINALYSIS AUTO W/O SCOPE: CPT | Performed by: NURSE PRACTITIONER

## 2023-07-05 NOTE — PROGRESS NOTES
[address] [place] [address]  [phone]    Patient Name:  Parish Vazquez. YOB: 1955      Office Visit 7/6/2023    CHIEF COMPLAINT:      Chief Complaint   Patient presents with    Sleep Apnea    Follow-up    CPAP/BiPAP          HISTORY OF PRESENT ILLNESS:         Patient is being seen today for follow-up of sleep apnea and hypersomnia. Patient was diagnosed with sleep apnea in 2017 with AHI 25.5/hr with lowest oxygen saturation 79%. Patient is prescribed  APAP 5-20 cm using a full face mask. He is using and benefiting from his CPAP on a daily basis and download reveals 98 % compliance on therapy with AHI down to 0.4/hr. Average pressure is between 8.9 and 13.6 cm. Patient is sleeping well and waking up rested in the morning. He denies any daytime sleepiness or napping. He denies any weight  changes with current weight of 229 lbs. He denies any significant medical changes. The Post score today is 6/24 indicating no significant daytime sleepiness. 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 His most recent CBC suggest that his hemoglobin is within slightly lower than normal range at 12.3 without any evidence to suggest polycythemia. No recent TSH has been documented.     Sleep Medicine 7/6/2023 3/7/2022   Sitting and reading 1 1   Watching TV 1 1   Sitting, inactive in a public place (e.g. a theatre or a meeting) 0 1   As a passenger in a car for an hour without a break 0 1   Lying down to rest in the afternoon when circumstances permit 3 1   Sitting and talking to someone 0 1   Sitting quietly after a lunch without alcohol 1 1   In a car, while stopped for a few minutes in traffic 0 1   Post

## 2023-07-06 ENCOUNTER — OFFICE VISIT (OUTPATIENT)
Dept: SLEEP MEDICINE | Age: 68
End: 2023-07-06
Payer: MEDICARE

## 2023-07-06 VITALS
HEIGHT: 71 IN | WEIGHT: 229 LBS | HEART RATE: 68 BPM | SYSTOLIC BLOOD PRESSURE: 158 MMHG | DIASTOLIC BLOOD PRESSURE: 84 MMHG | BODY MASS INDEX: 32.06 KG/M2 | RESPIRATION RATE: 14 BRPM | OXYGEN SATURATION: 98 %

## 2023-07-06 DIAGNOSIS — G47.10 HYPERSOMNIA: ICD-10-CM

## 2023-07-06 DIAGNOSIS — G47.33 OSA (OBSTRUCTIVE SLEEP APNEA): Primary | ICD-10-CM

## 2023-07-06 PROCEDURE — 1123F ACP DISCUSS/DSCN MKR DOCD: CPT | Performed by: INTERNAL MEDICINE

## 2023-07-06 PROCEDURE — G8417 CALC BMI ABV UP PARAM F/U: HCPCS | Performed by: INTERNAL MEDICINE

## 2023-07-06 PROCEDURE — 3077F SYST BP >= 140 MM HG: CPT | Performed by: INTERNAL MEDICINE

## 2023-07-06 PROCEDURE — 3017F COLORECTAL CA SCREEN DOC REV: CPT | Performed by: INTERNAL MEDICINE

## 2023-07-06 PROCEDURE — G8427 DOCREV CUR MEDS BY ELIG CLIN: HCPCS | Performed by: INTERNAL MEDICINE

## 2023-07-06 PROCEDURE — 1036F TOBACCO NON-USER: CPT | Performed by: INTERNAL MEDICINE

## 2023-07-06 PROCEDURE — 3079F DIAST BP 80-89 MM HG: CPT | Performed by: INTERNAL MEDICINE

## 2023-07-06 PROCEDURE — 99214 OFFICE O/P EST MOD 30 MIN: CPT | Performed by: INTERNAL MEDICINE

## 2023-07-06 PROCEDURE — 1111F DSCHRG MED/CURRENT MED MERGE: CPT | Performed by: INTERNAL MEDICINE

## 2023-07-06 ASSESSMENT — SLEEP AND FATIGUE QUESTIONNAIRES
HOW LIKELY ARE YOU TO NOD OFF OR FALL ASLEEP WHILE SITTING INACTIVE IN A PUBLIC PLACE: 0
HOW LIKELY ARE YOU TO NOD OFF OR FALL ASLEEP WHEN YOU ARE A PASSENGER IN A CAR FOR AN HOUR WITHOUT A BREAK: 0
HOW LIKELY ARE YOU TO NOD OFF OR FALL ASLEEP WHILE SITTING QUIETLY AFTER LUNCH WITHOUT ALCOHOL: 1
HOW LIKELY ARE YOU TO NOD OFF OR FALL ASLEEP WHILE SITTING AND TALKING TO SOMEONE: 0
HOW LIKELY ARE YOU TO NOD OFF OR FALL ASLEEP WHILE WATCHING TV: 1
HOW LIKELY ARE YOU TO NOD OFF OR FALL ASLEEP WHILE SITTING AND READING: 1
ESS TOTAL SCORE: 6
HOW LIKELY ARE YOU TO NOD OFF OR FALL ASLEEP WHILE LYING DOWN TO REST IN THE AFTERNOON WHEN CIRCUMSTANCES PERMIT: 3
HOW LIKELY ARE YOU TO NOD OFF OR FALL ASLEEP IN A CAR, WHILE STOPPED FOR A FEW MINUTES IN TRAFFIC: 0

## 2023-07-07 DIAGNOSIS — I10 ESSENTIAL HYPERTENSION: ICD-10-CM

## 2023-07-07 RX ORDER — VALSARTAN 40 MG/1
40 TABLET ORAL DAILY
Qty: 90 TABLET | Refills: 0 | Status: SHIPPED | OUTPATIENT
Start: 2023-07-07

## 2023-07-07 RX ORDER — SPIRONOLACTONE 25 MG/1
25 TABLET ORAL AS NEEDED
Qty: 90 TABLET | Refills: 3 | Status: SHIPPED | OUTPATIENT
Start: 2023-07-07

## 2023-07-07 RX ORDER — AMLODIPINE BESYLATE 10 MG/1
10 TABLET ORAL DAILY
Qty: 90 TABLET | Refills: 3 | Status: SHIPPED | OUTPATIENT
Start: 2023-07-07

## 2023-07-21 ENCOUNTER — TELEPHONE (OUTPATIENT)
Dept: FAMILY MEDICINE CLINIC | Facility: CLINIC | Age: 68
End: 2023-07-21

## 2023-07-21 DIAGNOSIS — I10 ESSENTIAL HYPERTENSION: Primary | ICD-10-CM

## 2023-07-21 DIAGNOSIS — E78.00 HIGH CHOLESTEROL: ICD-10-CM

## 2023-07-21 DIAGNOSIS — E11.9 TYPE 2 DIABETES MELLITUS WITHOUT COMPLICATION, WITHOUT LONG-TERM CURRENT USE OF INSULIN (HCC): ICD-10-CM

## 2023-07-24 ENCOUNTER — NURSE ONLY (OUTPATIENT)
Dept: FAMILY MEDICINE CLINIC | Facility: CLINIC | Age: 68
End: 2023-07-24

## 2023-07-24 DIAGNOSIS — E78.00 HIGH CHOLESTEROL: ICD-10-CM

## 2023-07-24 DIAGNOSIS — I10 ESSENTIAL HYPERTENSION: Primary | ICD-10-CM

## 2023-07-24 DIAGNOSIS — E11.9 TYPE 2 DIABETES MELLITUS WITHOUT COMPLICATION, WITHOUT LONG-TERM CURRENT USE OF INSULIN (HCC): ICD-10-CM

## 2023-07-24 LAB
BASOPHILS # BLD: 0.1 K/UL (ref 0–0.2)
BASOPHILS NFR BLD: 1 % (ref 0–2)
DIFFERENTIAL METHOD BLD: ABNORMAL
EOSINOPHIL # BLD: 0.1 K/UL (ref 0–0.8)
EOSINOPHIL NFR BLD: 2 % (ref 0.5–7.8)
ERYTHROCYTE [DISTWIDTH] IN BLOOD BY AUTOMATED COUNT: 15 % (ref 11.9–14.6)
HCT VFR BLD AUTO: 37.4 % (ref 41.1–50.3)
HGB BLD-MCNC: 11.5 G/DL (ref 13.6–17.2)
IMM GRANULOCYTES # BLD AUTO: 0 K/UL (ref 0–0.5)
IMM GRANULOCYTES NFR BLD AUTO: 0 % (ref 0–5)
LYMPHOCYTES # BLD: 2.4 K/UL (ref 0.5–4.6)
LYMPHOCYTES NFR BLD: 43 % (ref 13–44)
MCH RBC QN AUTO: 26.3 PG (ref 26.1–32.9)
MCHC RBC AUTO-ENTMCNC: 30.7 G/DL (ref 31.4–35)
MCV RBC AUTO: 85.6 FL (ref 82–102)
MONOCYTES # BLD: 0.4 K/UL (ref 0.1–1.3)
MONOCYTES NFR BLD: 7 % (ref 4–12)
NEUTS SEG # BLD: 2.5 K/UL (ref 1.7–8.2)
NEUTS SEG NFR BLD: 47 % (ref 43–78)
NRBC # BLD: 0 K/UL (ref 0–0.2)
PLATELET # BLD AUTO: 184 K/UL (ref 150–450)
PMV BLD AUTO: 11.6 FL (ref 9.4–12.3)
RBC # BLD AUTO: 4.37 M/UL (ref 4.23–5.6)
WBC # BLD AUTO: 5.5 K/UL (ref 4.3–11.1)

## 2023-07-25 LAB
ALBUMIN SERPL-MCNC: 3.7 G/DL (ref 3.2–4.6)
ALBUMIN/GLOB SERPL: 1.1 (ref 0.4–1.6)
ALP SERPL-CCNC: 72 U/L (ref 50–136)
ALT SERPL-CCNC: 23 U/L (ref 12–65)
ANION GAP SERPL CALC-SCNC: 8 MMOL/L (ref 2–11)
AST SERPL-CCNC: 27 U/L (ref 15–37)
BILIRUB SERPL-MCNC: 0.5 MG/DL (ref 0.2–1.1)
BUN SERPL-MCNC: 21 MG/DL (ref 8–23)
CALCIUM SERPL-MCNC: 9.5 MG/DL (ref 8.3–10.4)
CHLORIDE SERPL-SCNC: 113 MMOL/L (ref 101–110)
CHOLEST SERPL-MCNC: 124 MG/DL
CO2 SERPL-SCNC: 24 MMOL/L (ref 21–32)
CREAT SERPL-MCNC: 1.1 MG/DL (ref 0.8–1.5)
EST. AVERAGE GLUCOSE BLD GHB EST-MCNC: 151 MG/DL
GLOBULIN SER CALC-MCNC: 3.5 G/DL (ref 2.8–4.5)
GLUCOSE SERPL-MCNC: 140 MG/DL (ref 65–100)
HBA1C MFR BLD: 6.9 % (ref 4.8–5.6)
HDLC SERPL-MCNC: 51 MG/DL (ref 40–60)
HDLC SERPL: 2.4
LDLC SERPL CALC-MCNC: 59.8 MG/DL
POTASSIUM SERPL-SCNC: 3.5 MMOL/L (ref 3.5–5.1)
PROT SERPL-MCNC: 7.2 G/DL (ref 6.3–8.2)
SODIUM SERPL-SCNC: 145 MMOL/L (ref 133–143)
TRIGL SERPL-MCNC: 66 MG/DL (ref 35–150)
VLDLC SERPL CALC-MCNC: 13.2 MG/DL (ref 6–23)

## 2023-07-27 ENCOUNTER — TELEPHONE (OUTPATIENT)
Dept: UROLOGY | Age: 68
End: 2023-07-27

## 2023-07-27 NOTE — TELEPHONE ENCOUNTER
When pt states when urinates,  Urine  Drips sometimes. Other times unable to make it to the bathroom. Pt is drinking plenty of water.  Pt wants to know if this is normal?

## 2023-07-31 ENCOUNTER — OFFICE VISIT (OUTPATIENT)
Dept: FAMILY MEDICINE CLINIC | Facility: CLINIC | Age: 68
End: 2023-07-31
Payer: MEDICARE

## 2023-07-31 VITALS
RESPIRATION RATE: 16 BRPM | OXYGEN SATURATION: 99 % | HEART RATE: 78 BPM | BODY MASS INDEX: 31.92 KG/M2 | HEIGHT: 71 IN | WEIGHT: 228 LBS | DIASTOLIC BLOOD PRESSURE: 83 MMHG | SYSTOLIC BLOOD PRESSURE: 153 MMHG | TEMPERATURE: 97 F

## 2023-07-31 DIAGNOSIS — I25.10 ATHEROSCLEROSIS OF NATIVE CORONARY ARTERY OF NATIVE HEART WITHOUT ANGINA PECTORIS: ICD-10-CM

## 2023-07-31 DIAGNOSIS — E78.00 HIGH CHOLESTEROL: ICD-10-CM

## 2023-07-31 DIAGNOSIS — E11.9 TYPE 2 DIABETES MELLITUS WITHOUT COMPLICATION, WITHOUT LONG-TERM CURRENT USE OF INSULIN (HCC): ICD-10-CM

## 2023-07-31 DIAGNOSIS — C67.1 MALIGNANT NEOPLASM OF DOME OF URINARY BLADDER (HCC): ICD-10-CM

## 2023-07-31 DIAGNOSIS — N30.00 ACUTE CYSTITIS WITHOUT HEMATURIA: ICD-10-CM

## 2023-07-31 DIAGNOSIS — E66.9 OBESITY (BMI 30.0-34.9): ICD-10-CM

## 2023-07-31 DIAGNOSIS — G47.33 OSA (OBSTRUCTIVE SLEEP APNEA): ICD-10-CM

## 2023-07-31 DIAGNOSIS — I10 ESSENTIAL HYPERTENSION: ICD-10-CM

## 2023-07-31 DIAGNOSIS — Z00.00 MEDICARE ANNUAL WELLNESS VISIT, SUBSEQUENT: Primary | ICD-10-CM

## 2023-07-31 PROCEDURE — 3079F DIAST BP 80-89 MM HG: CPT | Performed by: FAMILY MEDICINE

## 2023-07-31 PROCEDURE — 3044F HG A1C LEVEL LT 7.0%: CPT | Performed by: FAMILY MEDICINE

## 2023-07-31 PROCEDURE — 3017F COLORECTAL CA SCREEN DOC REV: CPT | Performed by: FAMILY MEDICINE

## 2023-07-31 PROCEDURE — G8427 DOCREV CUR MEDS BY ELIG CLIN: HCPCS | Performed by: FAMILY MEDICINE

## 2023-07-31 PROCEDURE — 3077F SYST BP >= 140 MM HG: CPT | Performed by: FAMILY MEDICINE

## 2023-07-31 PROCEDURE — 99214 OFFICE O/P EST MOD 30 MIN: CPT | Performed by: FAMILY MEDICINE

## 2023-07-31 PROCEDURE — 1123F ACP DISCUSS/DSCN MKR DOCD: CPT | Performed by: FAMILY MEDICINE

## 2023-07-31 PROCEDURE — G8417 CALC BMI ABV UP PARAM F/U: HCPCS | Performed by: FAMILY MEDICINE

## 2023-07-31 PROCEDURE — 2022F DILAT RTA XM EVC RTNOPTHY: CPT | Performed by: FAMILY MEDICINE

## 2023-07-31 PROCEDURE — G0439 PPPS, SUBSEQ VISIT: HCPCS | Performed by: FAMILY MEDICINE

## 2023-07-31 PROCEDURE — 1036F TOBACCO NON-USER: CPT | Performed by: FAMILY MEDICINE

## 2023-07-31 RX ORDER — BLOOD SUGAR DIAGNOSTIC
STRIP MISCELLANEOUS
Qty: 100 STRIP | Refills: 3 | Status: SHIPPED | OUTPATIENT
Start: 2023-07-31

## 2023-07-31 RX ORDER — METFORMIN HYDROCHLORIDE 500 MG/1
1000 TABLET, EXTENDED RELEASE ORAL 2 TIMES DAILY
Qty: 180 TABLET | Refills: 3 | Status: SHIPPED | OUTPATIENT
Start: 2023-07-31

## 2023-07-31 ASSESSMENT — PATIENT HEALTH QUESTIONNAIRE - PHQ9
SUM OF ALL RESPONSES TO PHQ QUESTIONS 1-9: 0
2. FEELING DOWN, DEPRESSED OR HOPELESS: 0
SUM OF ALL RESPONSES TO PHQ9 QUESTIONS 1 & 2: 0
SUM OF ALL RESPONSES TO PHQ QUESTIONS 1-9: 0
1. LITTLE INTEREST OR PLEASURE IN DOING THINGS: 0
SUM OF ALL RESPONSES TO PHQ QUESTIONS 1-9: 0
SUM OF ALL RESPONSES TO PHQ QUESTIONS 1-9: 0

## 2023-07-31 ASSESSMENT — LIFESTYLE VARIABLES
HOW MANY STANDARD DRINKS CONTAINING ALCOHOL DO YOU HAVE ON A TYPICAL DAY: PATIENT DOES NOT DRINK
HOW OFTEN DO YOU HAVE A DRINK CONTAINING ALCOHOL: NEVER

## 2023-07-31 NOTE — PROGRESS NOTES
Medicare Annual Wellness Visit    Sarkis Hasten. 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 9)  Acute cystitis without hematuria  -     Culture, Urine; Future  AMY (obstructive sleep apnea)  Recommendations for Preventive Services Due: see orders and patient instructions/AVS.  Recommended screening schedule for the next 5-10 years is provided to the patient in written form: see Patient Instructions/AVS.     Return in about 6 months (around 1/31/2024). Subjective   The following acute and/or chronic problems were also addressed today:  He also comes in to check on diabetes, hypertension, and high cholesterol. Checks blood sugars once a week. Sugars have been running better and he has really worked on his diet. He did have bladder surgery back in the fall and his sugars were high then so he went back on 2 metformin a day and it has been helping. Last eye exam was last year. Feet have no problems. Did  have a flu shot this year. Did have a pneumonia shot pneumovax 2016 and prevnar 2015 . Did have a tetanus shot 2010. Has  been working on diet and exercise. Blood pressure has been running better at home - did not take his medications. He does have some diarrhea with the regular metformin. Would like for him to change to the metformin extended release to see if it will help with the diarrhea. He has been going through a lot with his bladder and recently had a procedure on his prostate so that his flow would be better. He has had bladder stones repeatedly.   He says he is 6 weeks out from his

## 2023-08-18 ENCOUNTER — TELEPHONE (OUTPATIENT)
Dept: UROLOGY | Age: 68
End: 2023-08-18

## 2023-08-18 NOTE — TELEPHONE ENCOUNTER
----- Message from Promise Flores RN sent at 8/17/2023  9:21 AM EDT -----  Regarding: FW: Bladder Issues, Problems  Contact: [phone]  Please advise. Thank you  ----- Message -----  From: Pepe Aviles. Sent: 8/16/2023   6:32 PM EDT  To: , #  Subject: Bladder Issues, Problems                         Hi Doctor Anna,  I'll try to make this understandable. I'm feeling the urine pass out of my bladder but nothing is coming out of my penis. then in a few seconds the urine start dripping out taking forever. Or I can take some toilet paper and press at the opening of the penis  to wick the remaining urine out. Then most days I can feel when I need to [facility] get up to go and the urine just starts leaking out and I can't stop it. Resulting in me wearing pull ups at night and pads during the day. Now today was the worst, while driving home from I felt that I needed to stop and go [facility] but the urine just started coming out while driving. Also note during the last week of July I passed a stone and was able to recover it. Well things started working normal for a week. then the same issues return. Something is not right, could their be more stones in my bladder? Please help it is becoming very depressing.   Thanks, Sapna Jules

## 2023-08-18 NOTE — TELEPHONE ENCOUNTER
I called pt  Make appt for next week. He needs PVR, UA, possible cysto. Can see me or NP, I can work him in for cysto .

## 2023-08-26 ENCOUNTER — HOSPITAL ENCOUNTER (EMERGENCY)
Age: 68
Discharge: HOME | End: 2023-08-26
Attending: EMERGENCY MEDICINE
Payer: MEDICARE

## 2023-08-26 VITALS
RESPIRATION RATE: 16 BRPM | BODY MASS INDEX: 32.2 KG/M2 | SYSTOLIC BLOOD PRESSURE: 158 MMHG | DIASTOLIC BLOOD PRESSURE: 77 MMHG | WEIGHT: 230 LBS | OXYGEN SATURATION: 99 % | TEMPERATURE: 97.9 F | HEIGHT: 71 IN | HEART RATE: 78 BPM

## 2023-08-26 DIAGNOSIS — R33.9 URINARY RETENTION: Primary | ICD-10-CM

## 2023-08-26 DIAGNOSIS — N39.0 URINARY TRACT INFECTION WITHOUT HEMATURIA, SITE UNSPECIFIED: ICD-10-CM

## 2023-08-26 LAB
ALBUMIN SERPL-MCNC: 4 G/DL (ref 3.2–4.6)
ALBUMIN/GLOB SERPL: 1.1 (ref 0.4–1.6)
ALP SERPL-CCNC: 79 U/L (ref 50–136)
ALT SERPL-CCNC: 23 U/L (ref 12–65)
AMORPH CRY URNS QL MICRO: NORMAL
ANION GAP SERPL CALC-SCNC: 7 MMOL/L (ref 2–11)
AST SERPL-CCNC: 17 U/L (ref 15–37)
BACTERIA URNS QL MICRO: NORMAL /HPF
BASOPHILS # BLD: 0.1 K/UL (ref 0–0.2)
BASOPHILS NFR BLD: 1 % (ref 0–2)
BILIRUB SERPL-MCNC: 0.5 MG/DL (ref 0.2–1.1)
BILIRUB UR QL: NEGATIVE
BUN SERPL-MCNC: 14 MG/DL (ref 8–23)
CALCIUM SERPL-MCNC: 9.1 MG/DL (ref 8.3–10.4)
CASTS URNS QL MICRO: 0 /LPF
CHLORIDE SERPL-SCNC: 109 MMOL/L (ref 101–110)
CO2 SERPL-SCNC: 26 MMOL/L (ref 21–32)
CREAT SERPL-MCNC: 1 MG/DL (ref 0.8–1.5)
CRYSTALS URNS QL MICRO: 0 /LPF
DIFFERENTIAL METHOD BLD: ABNORMAL
EOSINOPHIL # BLD: 0.1 K/UL (ref 0–0.8)
EOSINOPHIL NFR BLD: 2 % (ref 0.5–7.8)
EPI CELLS #/AREA URNS HPF: 0 /HPF
ERYTHROCYTE [DISTWIDTH] IN BLOOD BY AUTOMATED COUNT: 14.6 % (ref 11.9–14.6)
GLOBULIN SER CALC-MCNC: 3.6 G/DL (ref 2.8–4.5)
GLUCOSE SERPL-MCNC: 154 MG/DL (ref 65–100)
GLUCOSE UR QL STRIP.AUTO: NEGATIVE MG/DL
HCT VFR BLD AUTO: 38.2 % (ref 41.1–50.3)
HGB BLD-MCNC: 12 G/DL (ref 13.6–17.2)
IMM GRANULOCYTES # BLD AUTO: 0 K/UL (ref 0–0.5)
IMM GRANULOCYTES NFR BLD AUTO: 0 % (ref 0–5)
KETONES UR-MCNC: NEGATIVE MG/DL
LEUKOCYTE ESTERASE UR QL STRIP: ABNORMAL
LYMPHOCYTES # BLD: 1.9 K/UL (ref 0.5–4.6)
LYMPHOCYTES NFR BLD: 35 % (ref 13–44)
MCH RBC QN AUTO: 25.9 PG (ref 26.1–32.9)
MCHC RBC AUTO-ENTMCNC: 31.4 G/DL (ref 31.4–35)
MCV RBC AUTO: 82.5 FL (ref 82–102)
MONOCYTES # BLD: 0.4 K/UL (ref 0.1–1.3)
MONOCYTES NFR BLD: 7 % (ref 4–12)
MUCOUS THREADS URNS QL MICRO: 0 /LPF
NEUTS SEG # BLD: 3 K/UL (ref 1.7–8.2)
NEUTS SEG NFR BLD: 55 % (ref 43–78)
NITRITE UR QL: NEGATIVE
NRBC # BLD: 0 K/UL (ref 0–0.2)
PH UR: 6 (ref 5–9)
PLATELET # BLD AUTO: 190 K/UL (ref 150–450)
PMV BLD AUTO: 11.2 FL (ref 9.4–12.3)
POTASSIUM SERPL-SCNC: 3.7 MMOL/L (ref 3.5–5.1)
PROT SERPL-MCNC: 7.6 G/DL (ref 6.3–8.2)
PROT UR QL: 100 MG/DL
RBC # BLD AUTO: 4.63 M/UL (ref 4.23–5.6)
RBC # UR STRIP: ABNORMAL
RBC #/AREA URNS HPF: NORMAL /HPF
SERVICE CMNT-IMP: ABNORMAL
SODIUM SERPL-SCNC: 142 MMOL/L (ref 133–143)
SP GR UR: 1.01 (ref 1–1.02)
UROBILINOGEN UR QL: 0.2 EU/DL (ref 0.2–1)
WBC # BLD AUTO: 5.3 K/UL (ref 4.3–11.1)
WBC URNS QL MICRO: NORMAL /HPF

## 2023-08-26 PROCEDURE — 80053 COMPREHEN METABOLIC PANEL: CPT

## 2023-08-26 PROCEDURE — 81001 URINALYSIS AUTO W/SCOPE: CPT

## 2023-08-26 PROCEDURE — 99283 EMERGENCY DEPT VISIT LOW MDM: CPT

## 2023-08-26 PROCEDURE — 51702 INSERT TEMP BLADDER CATH: CPT

## 2023-08-26 PROCEDURE — 81003 URINALYSIS AUTO W/O SCOPE: CPT

## 2023-08-26 PROCEDURE — 87086 URINE CULTURE/COLONY COUNT: CPT

## 2023-08-26 PROCEDURE — 85025 COMPLETE CBC W/AUTO DIFF WBC: CPT

## 2023-08-26 PROCEDURE — 81015 MICROSCOPIC EXAM OF URINE: CPT

## 2023-08-26 RX ORDER — CEPHALEXIN 500 MG/1
500 CAPSULE ORAL 3 TIMES DAILY
Qty: 21 CAPSULE | Refills: 0 | Status: SHIPPED | OUTPATIENT
Start: 2023-08-26 | End: 2023-09-02

## 2023-08-26 ASSESSMENT — ENCOUNTER SYMPTOMS
RESPIRATORY NEGATIVE: 1
BACK PAIN: 0
GASTROINTESTINAL NEGATIVE: 1

## 2023-08-26 ASSESSMENT — PAIN SCALES - GENERAL: PAINLEVEL_OUTOF10: 8

## 2023-08-26 NOTE — ED TRIAGE NOTES
Pt arrived via POV c/o urinary retention that started this morning. Pt states he feels as if he is unable to finish voiding. Denies pain/burning when urinating. Pt c/o nausea, denies vomiting/chills.  HX bladder cancer, kidney stones, hypertrophy of prostate

## 2023-08-26 NOTE — ED PROVIDER NOTES
Emergency Department Provider Note       PCP: Murphy Sierra MD   Age: 75 y.o. Sex: male     DISPOSITION Decision To Discharge 08/26/2023 09:43:28 AM       ICD-10-CM    1. Urinary retention  R33.9 [facility]      2. Urinary tract infection without hematuria, site unspecified  N39.0 211 [facility], [facility] Decision Making     Complexity of Problems Addressed:  1 or more acute illnesses that pose a threat to life or bodily function. Data Reviewed and Analyzed:  I independently ordered and reviewed each unique test.  I reviewed external records: provider visit note from outside specialist.       I interpreted the labs. Discussion of management or test interpretation. Urinary retention. Will obtain lab. Will place Flannery. No saddle paresthesia. Sensation intact throughout the extremity. Bladder pressure on palpation. 9:50 AM  Flannery placed. 500 cc output. Patient feels better. Urine with bacteria and 10-20 WBC. Suspicious for UTI. Will start on Keflex 500 3 times daily x7 days. Urology follow-up and referral given. Recommend to call on Monday. Urine culture sent. No renal dysfunction noted at this time. Stable for discharge. Risk of Complications and/or Morbidity of Patient Management:  Prescription drug management performed. Shared medical decision making was utilized in creating the patients health plan today. ED Course as of 08/26/23 0950   Sat Aug 26, 2023   0927 Awaiting results of urine micro for assessment of possible uti [DT]      ED Course User Index  [DT] India Rubin MD       History      Gabriella Acharya is a 75 y.o. male who presents to the Emergency Department with chief complaint of    Chief Complaint   Patient presents with    Urinary Retention      HPI   5 Am. Couldn't urinate. Feel the need to go. Started dripping. No fever. No trauma. Bladder surgeries.   Secondary to bladder is warm. Capillary Refill: Capillary refill takes less than 2 seconds. Neurological:      General: No focal deficit present. Mental Status: He is alert. Mental status is at baseline. Cranial Nerves: No cranial nerve deficit. Sensory: No sensory deficit.         Procedures     Procedures    Orders Placed This Encounter   Procedures    Culture, Urine    Urinalysis, Micro    CBC with Auto Differential    CMP    211 [facility], [facility]    POCT Urinalysis no Micro    Insert/Charge Flannery Catheter - Simple        Medications given during this emergency department visit:  Medications - No data to display    New Prescriptions    CEPHALEXIN (KEFLEX) 500 MG CAPSULE    Take 1 capsule by mouth 3 times daily for 7 days        Past Medical History:   Diagnosis Date    Arthritis     back-     Bladder cancer ([facility])     Bladder stones     Chronic pain     back--pain comes and goes    Colon polyps 2/7/2013    Coronary atherosclerosis of native coronary vessel 03/28/2016    no mi/ no stents---- followed by dr Silas Yin    COVID-19 vaccine series completed 03/29/2021    moderna    DDD (degenerative disc disease) 02/07/2013    lower back    Diabetes ([facility] [address] [facility])     pre-diabetic; avg fbs:120's; A1C: 8.2-1/2023    GERD (gastroesophageal reflux disease)     occ med    History of kidney stones     x 1, passed on own    Hypertension     controlled by medication    Hypertrophy of prostate with urinary obstruction and other lower urinary tract symptoms (LUTS) 2/27/2014    Malignant neoplasm of bladder, part unspecified dx- summer 2012    surg/ BCG tx---- followed by dr Myrtle Beltran    Morbid obesity ([facility] [address] [facility])     bmi 34    Prediabetes     diet controlled---- sqbs avg am- ---- hypo at 80's    Pure hypercholesterolemia     managed with medication     Sleep apnea     wears cpap at hs    Urinary frequency         Past Surgical History:   Procedure Laterality Date    AMPUTATION Right     third finger due to a tumor in

## 2023-08-26 NOTE — DISCHARGE INSTRUCTIONS
Urine suspicious for possible UTI. I will start antibiotic 3 times a day for 7 days. Please call your urologist for follow-up. We will leave the Flannery in place. Return if any emergency.

## 2023-08-27 LAB
BACTERIA SPEC CULT: NORMAL
SERVICE CMNT-IMP: NORMAL

## 2023-08-28 ENCOUNTER — TELEPHONE (OUTPATIENT)
Dept: UROLOGY | Age: 68
End: 2023-08-28

## 2023-08-28 LAB
BACTERIA SPEC CULT: NORMAL
SERVICE CMNT-IMP: NORMAL

## 2023-08-28 NOTE — TELEPHONE ENCOUNTER
Pt called and states he went to ER for urinary retention. Has catheter placed.  Informed him that he has appointment with [facility] tomorrow

## 2023-08-29 ENCOUNTER — OFFICE VISIT (OUTPATIENT)
Dept: UROLOGY | Age: 68
End: 2023-08-29

## 2023-08-29 DIAGNOSIS — N21.0 BLADDER STONE: ICD-10-CM

## 2023-08-29 DIAGNOSIS — N40.1 BPH WITH OBSTRUCTION/LOWER URINARY TRACT SYMPTOMS: Primary | ICD-10-CM

## 2023-08-29 DIAGNOSIS — N13.8 BPH WITH OBSTRUCTION/LOWER URINARY TRACT SYMPTOMS: Primary | ICD-10-CM

## 2023-08-29 ASSESSMENT — ENCOUNTER SYMPTOMS: BACK PAIN: 0

## 2023-08-29 NOTE — PROGRESS NOTES
removed intact and w/o difficulty. Patient tolerated well. Push fluids. He is instructed to return to office today by 4 pm if unable to void. ?stone fragments in bladder. ?cysto needed. Will move up appt w Dr. Adrian Campbell. He will call sooner if needed. Mamta Stanley is supervising physician today and he approves plan of care.

## 2023-09-29 ENCOUNTER — OFFICE VISIT (OUTPATIENT)
Dept: UROLOGY | Age: 68
End: 2023-09-29
Payer: MEDICARE

## 2023-09-29 DIAGNOSIS — N21.0 BLADDER STONE: Primary | ICD-10-CM

## 2023-09-29 DIAGNOSIS — C67.9 MALIGNANT NEOPLASM OF URINARY BLADDER, UNSPECIFIED SITE (HCC): ICD-10-CM

## 2023-09-29 DIAGNOSIS — N21.0 CALCULUS IN BLADDER: ICD-10-CM

## 2023-09-29 LAB
BILIRUBIN, URINE, POC: NEGATIVE
BLOOD URINE, POC: NORMAL
GLUCOSE URINE, POC: 250
KETONES, URINE, POC: NEGATIVE
LEUKOCYTE ESTERASE, URINE, POC: NORMAL
NITRITE, URINE, POC: NEGATIVE
PH, URINE, POC: 6 (ref 4.6–8)
PROTEIN,URINE, POC: 30
SPECIFIC GRAVITY, URINE, POC: 1.02 (ref 1–1.03)
URINALYSIS CLARITY, POC: NORMAL
URINALYSIS COLOR, POC: NORMAL
UROBILINOGEN, POC: NORMAL

## 2023-09-29 PROCEDURE — G8417 CALC BMI ABV UP PARAM F/U: HCPCS | Performed by: UROLOGY

## 2023-09-29 PROCEDURE — G8428 CUR MEDS NOT DOCUMENT: HCPCS | Performed by: UROLOGY

## 2023-09-29 PROCEDURE — 99213 OFFICE O/P EST LOW 20 MIN: CPT | Performed by: UROLOGY

## 2023-09-29 PROCEDURE — 3017F COLORECTAL CA SCREEN DOC REV: CPT | Performed by: UROLOGY

## 2023-09-29 PROCEDURE — 81003 URINALYSIS AUTO W/O SCOPE: CPT | Performed by: UROLOGY

## 2023-09-29 PROCEDURE — 1036F TOBACCO NON-USER: CPT | Performed by: UROLOGY

## 2023-09-29 PROCEDURE — 1123F ACP DISCUSS/DSCN MKR DOCD: CPT | Performed by: UROLOGY

## 2023-09-29 ASSESSMENT — ENCOUNTER SYMPTOMS
BACK PAIN: 0
NAUSEA: 0

## 2023-09-29 NOTE — PROGRESS NOTES
MONOPOLAR TRANSURETHRAL RESECTION PROSTATE performed by Vilma Sanchez MD at [facility] MAIN OR    UROLOGICAL SURGERY      removal if bladder stones     Current Outpatient Medications   Medication Sig Dispense Refill    blood glucose test strips (ONETOUCH ULTRA) strip USE TO CHECK BLOOD SUGAR EVERY  strip 3    metFORMIN (GLUCOPHAGE-XR) 500 MG extended release tablet Take 2 tablets by mouth in the morning and at bedtime 180 tablet 3    valsartan (DIOVAN) 40 MG tablet TAKE 1 TABLET BY MOUTH IN THE MORNING 90 tablet 0    amLODIPine (NORVASC) 10 MG tablet TAKE 1 TABLET BY MOUTH DAILY 90 tablet 3    spironolactone (ALDACTONE) 25 MG tablet Take 1 tablet by mouth as needed (AS NEEDED) 90 tablet 3    potassium citrate (UROCIT-K) 10 MEQ (1080 MG) extended release tablet Take 1 tablet by mouth 2 times daily 180 tablet 3    hydrALAZINE (APRESOLINE) 100 MG tablet TAKE 1 TABLET BY MOUTH TWICE DAILY 180 tablet 3    metFORMIN (GLUCOPHAGE) 1000 MG tablet Take 1 tablet by mouth daily (with breakfast) (Patient taking differently: Take 1 tablet by mouth 2 times daily (with meals)) 90 tablet 3    tamsulosin (FLOMAX) 0.4 MG capsule Take 1 capsule by mouth daily 90 capsule 3    Multiple Vitamins-Minerals (MULTIVITAMIN MEN PO) Take by mouth daily      Probiotic Product (PROBIOTIC-10 PO) Take by mouth daily as needed      simvastatin (ZOCOR) 20 MG tablet Take 1 tablet by mouth nightly 90 tablet 3    aspirin 81 MG EC tablet Take 1 tablet by mouth      loratadine (CLARITIN) 10 MG tablet Take 1 tablet by mouth daily       No current facility-administered medications for this visit. Allergies   Allergen Reactions    Macadamia Nut Oil Anaphylaxis     Tongue and throat swelling with almonds. States that he recently noticed symptoms with eating peanuts.        Ace Inhibitors Other (See Comments)    [place] Oil Angioedema    Codeine Nausea And Vomiting     Social History     Socioeconomic History    Marital status:      Spouse name:

## 2023-10-04 DIAGNOSIS — I10 ESSENTIAL HYPERTENSION: ICD-10-CM

## 2023-10-04 DIAGNOSIS — E78.00 HIGH CHOLESTEROL: ICD-10-CM

## 2023-10-04 RX ORDER — VALSARTAN 40 MG/1
40 TABLET ORAL DAILY
Qty: 90 TABLET | Refills: 0 | Status: SHIPPED | OUTPATIENT
Start: 2023-10-04

## 2023-10-04 RX ORDER — SIMVASTATIN 20 MG
20 TABLET ORAL
Qty: 90 TABLET | Refills: 3 | Status: SHIPPED | OUTPATIENT
Start: 2023-10-04

## 2023-12-13 ENCOUNTER — OFFICE VISIT (OUTPATIENT)
Age: 68
End: 2023-12-13

## 2023-12-13 VITALS
WEIGHT: 234 LBS | SYSTOLIC BLOOD PRESSURE: 138 MMHG | BODY MASS INDEX: 32.76 KG/M2 | DIASTOLIC BLOOD PRESSURE: 74 MMHG | HEIGHT: 71 IN | HEART RATE: 82 BPM

## 2023-12-13 DIAGNOSIS — R00.2 PALPITATION: ICD-10-CM

## 2023-12-13 DIAGNOSIS — I25.10 ATHEROSCLEROSIS OF NATIVE CORONARY ARTERY OF NATIVE HEART WITHOUT ANGINA PECTORIS: Primary | ICD-10-CM

## 2023-12-13 DIAGNOSIS — I10 ESSENTIAL HYPERTENSION: ICD-10-CM

## 2023-12-13 DIAGNOSIS — G47.33 OSA (OBSTRUCTIVE SLEEP APNEA): ICD-10-CM

## 2023-12-13 NOTE — PROGRESS NOTES
[address] [facility], [facility], [address]  PHONE: [phone]     23    NAME:  Areli Rihc. : 1955  MRN: [id number]       SUBJECTIVE:   Areli Watkins is a 70 y.o. male seen for a follow up visit regarding the following:     Chief Complaint   Patient presents with    Coronary Artery Disease       HPI:   Here for CAD. CAD (mild CAD  [facility])   HTN (Ace cough)   NST 2019: low risk  Echo 10/2022: normal EF, LVH     Feeling well, walking. Home -130s. No new angina, CP, PALACIOS, SOB. Walking more at work. Aldactone as needed now. Patient denies recent history of orthopnea, PND, excessive dizziness and/or syncope. Followed for bladder CA, no issues, followed yearly now. Past Medical History, Past Surgical History, Family history, Social History, and Medications were all reviewed with the patient today and updated as necessary.      Current Outpatient Medications   Medication Sig Dispense Refill    valsartan (DIOVAN) 40 MG tablet TAKE 1 TABLET BY MOUTH IN THE MORNING 90 tablet 0    simvastatin (ZOCOR) 20 MG tablet TAKE 1 TABLET BY MOUTH EVERY NIGHT 90 tablet 3    blood glucose test strips (ONETOUCH ULTRA) strip USE TO CHECK BLOOD SUGAR EVERY  strip 3    amLODIPine (NORVASC) 10 MG tablet TAKE 1 TABLET BY MOUTH DAILY 90 tablet 3    hydrALAZINE (APRESOLINE) 100 MG tablet TAKE 1 TABLET BY MOUTH TWICE DAILY 180 tablet 3    metFORMIN (GLUCOPHAGE) 1000 MG tablet Take 1 tablet by mouth daily (with breakfast) (Patient taking differently: Take 1 tablet by mouth 2 times daily (with meals)) 90 tablet 3    Multiple Vitamins-Minerals (MULTIVITAMIN MEN PO) Take by mouth daily      Probiotic Product (PROBIOTIC-10 PO) Take by mouth daily as needed      aspirin 81 MG EC tablet Take 1 tablet by mouth      loratadine (CLARITIN) 10 MG tablet Take 1 tablet by mouth daily      spironolactone (ALDACTONE) 25 MG tablet Take 1 tablet by mouth as

## 2024-01-03 DIAGNOSIS — I10 ESSENTIAL HYPERTENSION: ICD-10-CM

## 2024-01-04 RX ORDER — VALSARTAN 40 MG/1
40 TABLET ORAL DAILY
Qty: 90 TABLET | Refills: 0 | Status: SHIPPED | OUTPATIENT
Start: 2024-01-04

## 2024-01-22 ENCOUNTER — OFFICE VISIT (OUTPATIENT)
Dept: UROLOGY | Age: 69
End: 2024-01-22
Payer: MEDICARE

## 2024-01-22 VITALS — HEIGHT: 71 IN | WEIGHT: 235.4 LBS | BODY MASS INDEX: 32.96 KG/M2

## 2024-01-22 DIAGNOSIS — C67.9 MALIGNANT NEOPLASM OF URINARY BLADDER, UNSPECIFIED SITE (HCC): ICD-10-CM

## 2024-01-22 DIAGNOSIS — R97.20 ELEVATED PROSTATE SPECIFIC ANTIGEN (PSA): ICD-10-CM

## 2024-01-22 DIAGNOSIS — N40.1 BPH WITH OBSTRUCTION/LOWER URINARY TRACT SYMPTOMS: ICD-10-CM

## 2024-01-22 DIAGNOSIS — N21.0 BLADDER STONE: Primary | ICD-10-CM

## 2024-01-22 DIAGNOSIS — N21.0 CALCULUS IN BLADDER: ICD-10-CM

## 2024-01-22 DIAGNOSIS — N13.8 BPH WITH OBSTRUCTION/LOWER URINARY TRACT SYMPTOMS: ICD-10-CM

## 2024-01-22 PROCEDURE — 1123F ACP DISCUSS/DSCN MKR DOCD: CPT | Performed by: UROLOGY

## 2024-01-22 PROCEDURE — 3017F COLORECTAL CA SCREEN DOC REV: CPT | Performed by: UROLOGY

## 2024-01-22 PROCEDURE — G8484 FLU IMMUNIZE NO ADMIN: HCPCS | Performed by: UROLOGY

## 2024-01-22 PROCEDURE — G8427 DOCREV CUR MEDS BY ELIG CLIN: HCPCS | Performed by: UROLOGY

## 2024-01-22 PROCEDURE — 1036F TOBACCO NON-USER: CPT | Performed by: UROLOGY

## 2024-01-22 PROCEDURE — 99213 OFFICE O/P EST LOW 20 MIN: CPT | Performed by: UROLOGY

## 2024-01-22 PROCEDURE — G8417 CALC BMI ABV UP PARAM F/U: HCPCS | Performed by: UROLOGY

## 2024-01-22 ASSESSMENT — PATIENT HEALTH QUESTIONNAIRE - PHQ9
SUM OF ALL RESPONSES TO PHQ9 QUESTIONS 1 & 2: 0
DEPRESSION UNABLE TO ASSESS: FUNCTIONAL CAPACITY MOTIVATION LIMITS ACCURACY
SUM OF ALL RESPONSES TO PHQ QUESTIONS 1-9: 0
2. FEELING DOWN, DEPRESSED OR HOPELESS: 0
SUM OF ALL RESPONSES TO PHQ QUESTIONS 1-9: 0
1. LITTLE INTEREST OR PLEASURE IN DOING THINGS: 0
SUM OF ALL RESPONSES TO PHQ QUESTIONS 1-9: 0
SUM OF ALL RESPONSES TO PHQ QUESTIONS 1-9: 0

## 2024-01-22 ASSESSMENT — ANXIETY QUESTIONNAIRES
2. NOT BEING ABLE TO STOP OR CONTROL WORRYING: 0
4. TROUBLE RELAXING: 0
6. BECOMING EASILY ANNOYED OR IRRITABLE: 0
3. WORRYING TOO MUCH ABOUT DIFFERENT THINGS: 0
GAD7 TOTAL SCORE: 0
5. BEING SO RESTLESS THAT IT IS HARD TO SIT STILL: 0
1. FEELING NERVOUS, ANXIOUS, OR ON EDGE: 0
IF YOU CHECKED OFF ANY PROBLEMS ON THIS QUESTIONNAIRE, HOW DIFFICULT HAVE THESE PROBLEMS MADE IT FOR YOU TO DO YOUR WORK, TAKE CARE OF THINGS AT HOME, OR GET ALONG WITH OTHER PEOPLE: NOT DIFFICULT AT ALL
7. FEELING AFRAID AS IF SOMETHING AWFUL MIGHT HAPPEN: 0

## 2024-01-22 NOTE — PROGRESS NOTES
[facility]  [address] [place] [id number]  [phone]    Sai Emmanuel Jr.  : 1955    Chief Complaint   Patient presents with    Follow-up        HPI     Sai Emmanuel Jr. is a 68 y.o. male   With hx of bladder cancer, elevated PSA.   S/p TURBT 10-8-12, for stage T1 bladder cancer. completed BCG maintenance. s/p TURP/litholapaxy.     Had a papillary tumor at the right dome and right lateral wall.   Pathology shows papillary high-grade urothelial carcinoma with   focal superficial lamina propria invasion from the bladder dome   tumor. Muscularis propria was not identified.   We also did a separate biopsy of the bladder dome adjacent to the   tumor. It shows high grade urothelial carcinoma with associated   superficial lamina propria invasion, muscularis propria not   identified.   The right lateral wall tumor shows papillary high-grade   urothelial carcinoma, definitive features of invasion are not   identified, muscularis propria is not identified.   We did a re-biopsy of the resection base. There is focally   necrotic tissue present, no carcinoma noted.   Completed 6 week induction course of BCG in .   Follow up cysto showed tumor on left dome, flat red area right   dome.   S/P TURBT on 13. Papillary tumor left dome shows high grade   papillary tumor, no invasion. Right dome flat area shows focal   features of high grade urothelial carcinoma, Separate fragment of dysplastic urothelium. Small fragment of muscularis present,   uninvolved by tumor.   Cystoscopy in 2013 shows BPH, 4+ trabeculation, no tumors.   Atypical cytology with negative fish test.--S/P Cystoscopy with   bladder biopsy on 10/17/13. Pathology revealed urothelial atypia.     Has fnished 6 weeks of BCG at end of .  Continues on Flomax. Completed  BCG maintenance.   Cytology in  showed atypical cells, but negative FISH. Had 3   week BCG in .   Negative

## 2024-01-23 ENCOUNTER — TELEPHONE (OUTPATIENT)
Dept: FAMILY MEDICINE CLINIC | Facility: CLINIC | Age: 69
End: 2024-01-23

## 2024-01-23 DIAGNOSIS — E11.9 TYPE 2 DIABETES MELLITUS WITHOUT COMPLICATION, WITHOUT LONG-TERM CURRENT USE OF INSULIN (HCC): ICD-10-CM

## 2024-01-23 DIAGNOSIS — E78.00 HIGH CHOLESTEROL: ICD-10-CM

## 2024-01-23 DIAGNOSIS — I10 ESSENTIAL HYPERTENSION: Primary | ICD-10-CM

## 2024-01-24 ENCOUNTER — NURSE ONLY (OUTPATIENT)
Dept: FAMILY MEDICINE CLINIC | Facility: CLINIC | Age: 69
End: 2024-01-24

## 2024-01-24 DIAGNOSIS — E11.9 TYPE 2 DIABETES MELLITUS WITHOUT COMPLICATION, WITHOUT LONG-TERM CURRENT USE OF INSULIN (HCC): ICD-10-CM

## 2024-01-24 DIAGNOSIS — I10 ESSENTIAL HYPERTENSION: ICD-10-CM

## 2024-01-24 DIAGNOSIS — E78.00 HIGH CHOLESTEROL: ICD-10-CM

## 2024-01-24 LAB
ALBUMIN SERPL-MCNC: 3.9 G/DL (ref 3.2–4.6)
ALBUMIN/GLOB SERPL: 1.2 (ref 0.4–1.6)
ALP SERPL-CCNC: 83 U/L (ref 50–136)
ALT SERPL-CCNC: 24 U/L (ref 12–65)
ANION GAP SERPL CALC-SCNC: 1 MMOL/L (ref 2–11)
AST SERPL-CCNC: 17 U/L (ref 15–37)
BASOPHILS # BLD: 0 K/UL (ref 0–0.2)
BASOPHILS NFR BLD: 1 % (ref 0–2)
BILIRUB SERPL-MCNC: 0.7 MG/DL (ref 0.2–1.1)
BUN SERPL-MCNC: 22 MG/DL (ref 8–23)
CALCIUM SERPL-MCNC: 8.7 MG/DL (ref 8.3–10.4)
CHLORIDE SERPL-SCNC: 112 MMOL/L (ref 103–113)
CHOLEST SERPL-MCNC: 121 MG/DL
CO2 SERPL-SCNC: 27 MMOL/L (ref 21–32)
CREAT SERPL-MCNC: 1 MG/DL (ref 0.8–1.5)
DIFFERENTIAL METHOD BLD: ABNORMAL
EOSINOPHIL # BLD: 0.1 K/UL (ref 0–0.8)
EOSINOPHIL NFR BLD: 2 % (ref 0.5–7.8)
ERYTHROCYTE [DISTWIDTH] IN BLOOD BY AUTOMATED COUNT: 15.4 % (ref 11.9–14.6)
EST. AVERAGE GLUCOSE BLD GHB EST-MCNC: 157 MG/DL
GLOBULIN SER CALC-MCNC: 3.3 G/DL (ref 2.8–4.5)
GLUCOSE SERPL-MCNC: 139 MG/DL (ref 65–100)
HBA1C MFR BLD: 7.1 % (ref 4.8–5.6)
HCT VFR BLD AUTO: 38.3 % (ref 41.1–50.3)
HDLC SERPL-MCNC: 48 MG/DL (ref 40–60)
HDLC SERPL: 2.5
HGB BLD-MCNC: 11.6 G/DL (ref 13.6–17.2)
IMM GRANULOCYTES # BLD AUTO: 0 K/UL (ref 0–0.5)
IMM GRANULOCYTES NFR BLD AUTO: 0 % (ref 0–5)
LDLC SERPL CALC-MCNC: 55 MG/DL
LYMPHOCYTES # BLD: 2.1 K/UL (ref 0.5–4.6)
LYMPHOCYTES NFR BLD: 41 % (ref 13–44)
MCH RBC QN AUTO: 25.6 PG (ref 26.1–32.9)
MCHC RBC AUTO-ENTMCNC: 30.3 G/DL (ref 31.4–35)
MCV RBC AUTO: 84.4 FL (ref 82–102)
MONOCYTES # BLD: 0.4 K/UL (ref 0.1–1.3)
MONOCYTES NFR BLD: 7 % (ref 4–12)
NEUTS SEG # BLD: 2.6 K/UL (ref 1.7–8.2)
NEUTS SEG NFR BLD: 50 % (ref 43–78)
NRBC # BLD: 0 K/UL (ref 0–0.2)
PLATELET # BLD AUTO: 165 K/UL (ref 150–450)
PMV BLD AUTO: 11.6 FL (ref 9.4–12.3)
POTASSIUM SERPL-SCNC: 3.5 MMOL/L (ref 3.5–5.1)
PROT SERPL-MCNC: 7.2 G/DL (ref 6.3–8.2)
RBC # BLD AUTO: 4.54 M/UL (ref 4.23–5.6)
SODIUM SERPL-SCNC: 140 MMOL/L (ref 136–146)
TRIGL SERPL-MCNC: 90 MG/DL (ref 35–150)
VLDLC SERPL CALC-MCNC: 18 MG/DL (ref 6–23)
WBC # BLD AUTO: 5.2 K/UL (ref 4.3–11.1)

## 2024-02-05 ENCOUNTER — TELEPHONE (OUTPATIENT)
Dept: FAMILY MEDICINE CLINIC | Facility: CLINIC | Age: 69
End: 2024-02-05

## 2024-02-05 DIAGNOSIS — E11.9 TYPE 2 DIABETES MELLITUS WITHOUT COMPLICATION, WITHOUT LONG-TERM CURRENT USE OF INSULIN (HCC): ICD-10-CM

## 2024-02-05 NOTE — TELEPHONE ENCOUNTER
Medication Refill Request      Name of Medication : Metformin ER      Strength of Medication: 500 mg      Directions: Take 2 tablets in AM and 2 tablets in PM      30 day or 90 day supply: 30      Preferred Pharmacy: [facility]    Additional Information For Provider: Patient has an appointment on 02/20/2024. His appointment was rescheduled from last week.

## 2024-02-20 ENCOUNTER — OFFICE VISIT (OUTPATIENT)
Dept: FAMILY MEDICINE CLINIC | Facility: CLINIC | Age: 69
End: 2024-02-20
Payer: MEDICARE

## 2024-02-20 VITALS
OXYGEN SATURATION: 99 % | HEART RATE: 68 BPM | BODY MASS INDEX: 33.04 KG/M2 | WEIGHT: 236 LBS | SYSTOLIC BLOOD PRESSURE: 154 MMHG | HEIGHT: 71 IN | RESPIRATION RATE: 16 BRPM | TEMPERATURE: 97 F | DIASTOLIC BLOOD PRESSURE: 83 MMHG

## 2024-02-20 DIAGNOSIS — Z12.11 SCREENING FOR MALIGNANT NEOPLASM OF COLON: ICD-10-CM

## 2024-02-20 DIAGNOSIS — D50.9 IRON DEFICIENCY ANEMIA, UNSPECIFIED IRON DEFICIENCY ANEMIA TYPE: ICD-10-CM

## 2024-02-20 DIAGNOSIS — E11.9 TYPE 2 DIABETES MELLITUS WITHOUT COMPLICATION, WITHOUT LONG-TERM CURRENT USE OF INSULIN (HCC): Primary | ICD-10-CM

## 2024-02-20 DIAGNOSIS — E78.00 HIGH CHOLESTEROL: ICD-10-CM

## 2024-02-20 DIAGNOSIS — I10 ESSENTIAL HYPERTENSION: ICD-10-CM

## 2024-02-20 DIAGNOSIS — Z23 NEED FOR IMMUNIZATION AGAINST INFLUENZA: ICD-10-CM

## 2024-02-20 LAB
CREAT UR-MCNC: 228 MG/DL
MICROALBUMIN UR-MCNC: 23.5 MG/DL
MICROALBUMIN/CREAT UR-RTO: 103 MG/G (ref 0–30)

## 2024-02-20 PROCEDURE — 3051F HG A1C>EQUAL 7.0%<8.0%: CPT | Performed by: FAMILY MEDICINE

## 2024-02-20 PROCEDURE — 1036F TOBACCO NON-USER: CPT | Performed by: FAMILY MEDICINE

## 2024-02-20 PROCEDURE — 3079F DIAST BP 80-89 MM HG: CPT | Performed by: FAMILY MEDICINE

## 2024-02-20 PROCEDURE — G8484 FLU IMMUNIZE NO ADMIN: HCPCS | Performed by: FAMILY MEDICINE

## 2024-02-20 PROCEDURE — G8428 CUR MEDS NOT DOCUMENT: HCPCS | Performed by: FAMILY MEDICINE

## 2024-02-20 PROCEDURE — G0008 ADMIN INFLUENZA VIRUS VAC: HCPCS | Performed by: FAMILY MEDICINE

## 2024-02-20 PROCEDURE — 90694 VACC AIIV4 NO PRSRV 0.5ML IM: CPT | Performed by: FAMILY MEDICINE

## 2024-02-20 PROCEDURE — 2022F DILAT RTA XM EVC RTNOPTHY: CPT | Performed by: FAMILY MEDICINE

## 2024-02-20 PROCEDURE — 3017F COLORECTAL CA SCREEN DOC REV: CPT | Performed by: FAMILY MEDICINE

## 2024-02-20 PROCEDURE — 1123F ACP DISCUSS/DSCN MKR DOCD: CPT | Performed by: FAMILY MEDICINE

## 2024-02-20 PROCEDURE — 3077F SYST BP >= 140 MM HG: CPT | Performed by: FAMILY MEDICINE

## 2024-02-20 PROCEDURE — G8417 CALC BMI ABV UP PARAM F/U: HCPCS | Performed by: FAMILY MEDICINE

## 2024-02-20 RX ORDER — VALSARTAN 40 MG/1
40 TABLET ORAL DAILY
Qty: 90 TABLET | Refills: 3 | Status: SHIPPED | OUTPATIENT
Start: 2024-02-20

## 2024-02-20 RX ORDER — METFORMIN HYDROCHLORIDE 500 MG/1
1000 TABLET, EXTENDED RELEASE ORAL 2 TIMES DAILY
Qty: 360 TABLET | Refills: 3 | Status: SHIPPED | OUTPATIENT
Start: 2024-02-20

## 2024-02-20 RX ORDER — HYDRALAZINE HYDROCHLORIDE 100 MG/1
TABLET, FILM COATED ORAL
Qty: 180 TABLET | Refills: 3 | Status: SHIPPED | OUTPATIENT
Start: 2024-02-20

## 2024-02-20 RX ORDER — BLOOD SUGAR DIAGNOSTIC
STRIP MISCELLANEOUS
Qty: 100 STRIP | Refills: 3 | Status: CANCELLED | OUTPATIENT
Start: 2024-02-20

## 2024-02-20 ASSESSMENT — PATIENT HEALTH QUESTIONNAIRE - PHQ9
SUM OF ALL RESPONSES TO PHQ QUESTIONS 1-9: 0
2. FEELING DOWN, DEPRESSED OR HOPELESS: 0
SUM OF ALL RESPONSES TO PHQ QUESTIONS 1-9: 0
SUM OF ALL RESPONSES TO PHQ QUESTIONS 1-9: 0
1. LITTLE INTEREST OR PLEASURE IN DOING THINGS: 0
SUM OF ALL RESPONSES TO PHQ9 QUESTIONS 1 & 2: 0
SUM OF ALL RESPONSES TO PHQ QUESTIONS 1-9: 0

## 2024-02-20 NOTE — PROGRESS NOTES
[facility] [place] [address]  Phone: [phone] Fax [phone]  Shayna Dailey MD  2/20/2024         Mr. Emmanuel  is a 68 y.o.  year old  male patient who comes in to check on diabetes, hypertension, and high cholesterol.Checks blood sugars once a week. Sugars have been running better and he has really worked on his diet.  He had been taking metformin extended release 500 mg 2 in the morning and 2 in the evening with the last prescription he got was for the not extended release and he needs extended release due to diarrhea but then on extended release.    Last eye exam was last year. Feet have no problems. Did not have a flu shot this year. Did have a pneumonia shot pneumovax 2016 and prevnar 2015 . Did have a tetanus shot 2010. Has  been working on diet and exercise. Blood pressure has been better at home.  Mr. Emmanuel  has  has a past medical history of Arthritis, Bladder cancer ([facility]), Bladder stones, Chronic pain, Colon polyps, Coronary atherosclerosis of native coronary vessel, COVID-19 vaccine series completed, DDD (degenerative disc disease), Diabetes (HCC), GERD (gastroesophageal reflux disease), History of kidney stones, Hypertension, Hypertrophy of prostate with urinary obstruction and other lower urinary tract symptoms (LUTS), Malignant neoplasm of bladder, part unspecified, Morbid obesity (HCC), Prediabetes, Pure hypercholesterolemia, Sleep apnea, and Urinary frequency.    Mr. Emmanuel  has  has a past surgical history that includes amputation (Right); close cystostomy (9/2012); close cystostomy (07/2021); tumor removal (1974); Colonoscopy (2013); Colonoscopy (N/A, 2/14/2018); orthopedic surgery (Bilateral, 2000's); Cystoscopy (N/A, 11/29/2022); Urological Surgery; TURP (N/A, 6/13/2023); and Bladder surgery (N/A, 6/13/2023).    Mr. Emmanuel   Current Outpatient Medications   Medication Sig Dispense Refill    hydrALAZINE (APRESOLINE) 100 MG

## 2024-02-23 DIAGNOSIS — E11.9 TYPE 2 DIABETES MELLITUS WITHOUT COMPLICATION, WITHOUT LONG-TERM CURRENT USE OF INSULIN (HCC): Primary | ICD-10-CM

## 2024-06-18 ENCOUNTER — OFFICE VISIT (OUTPATIENT)
Age: 69
End: 2024-06-18
Payer: MEDICARE

## 2024-06-18 VITALS
HEART RATE: 68 BPM | BODY MASS INDEX: 32.68 KG/M2 | DIASTOLIC BLOOD PRESSURE: 78 MMHG | WEIGHT: 233.4 LBS | SYSTOLIC BLOOD PRESSURE: 156 MMHG | HEIGHT: 71 IN

## 2024-06-18 DIAGNOSIS — I10 ESSENTIAL HYPERTENSION: ICD-10-CM

## 2024-06-18 DIAGNOSIS — G47.33 OSA (OBSTRUCTIVE SLEEP APNEA): ICD-10-CM

## 2024-06-18 DIAGNOSIS — R00.2 PALPITATION: ICD-10-CM

## 2024-06-18 DIAGNOSIS — I25.10 ATHEROSCLEROSIS OF NATIVE CORONARY ARTERY OF NATIVE HEART WITHOUT ANGINA PECTORIS: Primary | ICD-10-CM

## 2024-06-18 PROCEDURE — G8417 CALC BMI ABV UP PARAM F/U: HCPCS | Performed by: INTERNAL MEDICINE

## 2024-06-18 PROCEDURE — 1123F ACP DISCUSS/DSCN MKR DOCD: CPT | Performed by: INTERNAL MEDICINE

## 2024-06-18 PROCEDURE — 1036F TOBACCO NON-USER: CPT | Performed by: INTERNAL MEDICINE

## 2024-06-18 PROCEDURE — 3078F DIAST BP <80 MM HG: CPT | Performed by: INTERNAL MEDICINE

## 2024-06-18 PROCEDURE — 3077F SYST BP >= 140 MM HG: CPT | Performed by: INTERNAL MEDICINE

## 2024-06-18 PROCEDURE — G8428 CUR MEDS NOT DOCUMENT: HCPCS | Performed by: INTERNAL MEDICINE

## 2024-06-18 PROCEDURE — 99214 OFFICE O/P EST MOD 30 MIN: CPT | Performed by: INTERNAL MEDICINE

## 2024-06-18 PROCEDURE — 3017F COLORECTAL CA SCREEN DOC REV: CPT | Performed by: INTERNAL MEDICINE

## 2024-06-18 RX ORDER — PNV NO.95/FERROUS FUM/FOLIC AC 28MG-0.8MG
TABLET ORAL
COMMUNITY

## 2024-06-18 RX ORDER — METFORMIN HYDROCHLORIDE 500 MG/1
1000 TABLET, EXTENDED RELEASE ORAL 2 TIMES DAILY
COMMUNITY

## 2024-06-18 NOTE — PROGRESS NOTES
01/24/2024       No results found for: \"TSHFT4\", \"TSH\"    Lab Results   Component Value Date    LABA1C 7.1 (H) 01/24/2024     Lab Results   Component Value Date     01/24/2024       Lab Results   Component Value Date    CHOL 121 01/24/2024    CHOL 124 07/24/2023    CHOL 137 01/20/2023     Lab Results   Component Value Date    TRIG 90 01/24/2024    TRIG 66 07/24/2023    TRIG 106 01/20/2023     Lab Results   Component Value Date    HDL 48 01/24/2024    HDL 51 07/24/2023    HDL 47 01/20/2023     No components found for: \"LDLCHOLESTEROL\", \"LDLCALC\"  Lab Results   Component Value Date    VLDL 18 01/24/2024    VLDL 13.2 07/24/2023    VLDL 21.2 01/20/2023     Lab Results   Component Value Date    CHOLHDLRATIO 2.5 01/24/2024    CHOLHDLRATIO 2.4 07/24/2023    CHOLHDLRATIO 2.9 01/20/2023     Lab Results   Component Value Date    LDL 55 01/24/2024           10/12/22    TRANSTHORACIC ECHOCARDIOGRAM (TTE) COMPLETE (CONTRAST/BUBBLE/3D PRN) 10/20/2022 10:49 PM, 10/20/2022 12:00 AM (Final)    Interpretation Summary    Left Ventricle: Normal left ventricular systolic function with a visually estimated EF of 60 - 65%. Left ventricle size is normal. Mildly increased wall thickness. Normal wall motion. Abnormal diastolic function.    Aortic Valve: Mild sclerosis of the aortic valve cusp.    Left Atrium: Left atrium is mildly dilated. LA Vol Index is  29 ml/m2.    Signed by: Markie Cortes DO on 10/20/2022 10:49 PM, Signed by: Unknown Provider Result on 10/20/2022 12:00 AM        I have Independently reviewed prior care notes, any ER records available, cardiac testing, labs and results with the patient and before seeing the patient today.  Also independently reviewed outside records when available.       ASSESSMENT:    Sai was seen today for coronary artery disease.    Diagnoses and all orders for this visit:    Atherosclerosis of native coronary artery of native heart without angina pectoris  -     Nuclear stress test

## 2024-07-04 RX ORDER — AMLODIPINE BESYLATE 10 MG/1
10 TABLET ORAL DAILY
Qty: 90 TABLET | Refills: 3 | Status: SHIPPED | OUTPATIENT
Start: 2024-07-04

## 2024-07-09 NOTE — PROGRESS NOTES
recommended    Orders Placed This Encounter   Procedures    DME - DURABLE MEDICAL EQUIPMENT     [facility]  Phone: [address]  [facility] [address]  Dept: [phone]      Patient Name: Sai Emmanuel Jr.  : 1955  Gender: male  Address: [address]  [facility] [address]  Patient phone: [phone] (home)       Primary Insurance: Payor: BCBS / Plan: BCBS OUT OF STATE / Product Type: *No Product type* /   Subscriber ID: [id number] - ([employer])      AMB Supply Order  Order Details     DME Location:    Order Date: 7/10/2024   The primary encounter diagnosis was AMY (obstructive sleep apnea). A diagnosis of Hypersomnia was also pertinent to this visit.             (  X   )Supplies Needed         Machine   (     ) CPAP Unit  (     ) Auto CPAP Unit  (     ) BiLevel Unit  (     ) Auto BiLevel Unit  (     ) ASV   (     ) Bilevel ST      Length of need: 12 months    Pressure:   10-15 cmH20  EPR: 3     Starting Ramp Pressure:    cm H20  Ramp Time: min       Patient had a diagnostic Apnea Hypopnea Index (AHI) of :      *SUPPLIES* Replace all as needed, or per coverage guidelines     Masks Type:  ( x   ) -Full Face Mask (1 per 3 mon)  (  x  ) -Full Mask (1 per month) Interface/Cushion      (  ) -Nasal Mask (1 per 3 mon)  (  ) - Nasal Mask (1 per month) Interface/Cushion  (     ) -Pillow (2 per mon)  (     ) -Zavctltvh (1 per 6 mon)            Other Supplies:    (   X  )-Oackpwfa (1 per 6 mon)  (   X  )-Pofbbt Tubing (1 per 3 mon)  (   X  )- Disposable Filter (2 per mon)  (   x  )-Bxsshh Humidifier (1 per year)     ( x    )-Wborkcqyu (sometimes used with Full Face Mask) (1 per 6 mos)  (    )-Tubing-without heat (1 per 3 mos)  (     )-Non-Disposable Filter (1 per 6 mos)  (  x   )-Water Chamber (1 per 6 mos)  (     )-Humidifier non-heated

## 2024-07-10 ENCOUNTER — OFFICE VISIT (OUTPATIENT)
Dept: SLEEP MEDICINE | Age: 69
End: 2024-07-10
Payer: COMMERCIAL

## 2024-07-10 VITALS
OXYGEN SATURATION: 98 % | SYSTOLIC BLOOD PRESSURE: 146 MMHG | DIASTOLIC BLOOD PRESSURE: 80 MMHG | RESPIRATION RATE: 14 BRPM | BODY MASS INDEX: 32.9 KG/M2 | WEIGHT: 235 LBS | HEIGHT: 71 IN | HEART RATE: 73 BPM

## 2024-07-10 DIAGNOSIS — G47.33 OSA (OBSTRUCTIVE SLEEP APNEA): Primary | ICD-10-CM

## 2024-07-10 DIAGNOSIS — G47.10 HYPERSOMNIA: ICD-10-CM

## 2024-07-10 PROCEDURE — 99214 OFFICE O/P EST MOD 30 MIN: CPT | Performed by: INTERNAL MEDICINE

## 2024-07-10 PROCEDURE — 3079F DIAST BP 80-89 MM HG: CPT | Performed by: INTERNAL MEDICINE

## 2024-07-10 PROCEDURE — 1036F TOBACCO NON-USER: CPT | Performed by: INTERNAL MEDICINE

## 2024-07-10 PROCEDURE — 3077F SYST BP >= 140 MM HG: CPT | Performed by: INTERNAL MEDICINE

## 2024-07-10 PROCEDURE — 3017F COLORECTAL CA SCREEN DOC REV: CPT | Performed by: INTERNAL MEDICINE

## 2024-07-10 PROCEDURE — G8427 DOCREV CUR MEDS BY ELIG CLIN: HCPCS | Performed by: INTERNAL MEDICINE

## 2024-07-10 PROCEDURE — G8417 CALC BMI ABV UP PARAM F/U: HCPCS | Performed by: INTERNAL MEDICINE

## 2024-07-10 PROCEDURE — G2211 COMPLEX E/M VISIT ADD ON: HCPCS | Performed by: INTERNAL MEDICINE

## 2024-07-10 PROCEDURE — 1123F ACP DISCUSS/DSCN MKR DOCD: CPT | Performed by: INTERNAL MEDICINE

## 2024-07-10 ASSESSMENT — SLEEP AND FATIGUE QUESTIONNAIRES
ESS TOTAL SCORE: 9
HOW LIKELY ARE YOU TO NOD OFF OR FALL ASLEEP WHILE WATCHING TV: SLIGHT CHANCE OF DOZING
HOW LIKELY ARE YOU TO NOD OFF OR FALL ASLEEP WHILE LYING DOWN TO REST IN THE AFTERNOON WHEN CIRCUMSTANCES PERMIT: WOULD NEVER DOZE
HOW LIKELY ARE YOU TO NOD OFF OR FALL ASLEEP WHILE SITTING INACTIVE IN A PUBLIC PLACE: SLIGHT CHANCE OF DOZING
HOW LIKELY ARE YOU TO NOD OFF OR FALL ASLEEP WHILE SITTING AND READING: SLIGHT CHANCE OF DOZING
HOW LIKELY ARE YOU TO NOD OFF OR FALL ASLEEP WHILE SITTING AND TALKING TO SOMEONE: WOULD NEVER DOZE
HOW LIKELY ARE YOU TO NOD OFF OR FALL ASLEEP IN A CAR, WHILE STOPPED FOR A FEW MINUTES IN TRAFFIC: WOULD NEVER DOZE
HOW LIKELY ARE YOU TO NOD OFF OR FALL ASLEEP WHEN YOU ARE A PASSENGER IN A CAR FOR AN HOUR WITHOUT A BREAK: HIGH CHANCE OF DOZING
HOW LIKELY ARE YOU TO NOD OFF OR FALL ASLEEP WHILE SITTING QUIETLY AFTER LUNCH WITHOUT ALCOHOL: HIGH CHANCE OF DOZING

## 2024-07-16 ENCOUNTER — TELEPHONE (OUTPATIENT)
Age: 69
End: 2024-07-16

## 2024-07-16 ENCOUNTER — LAB (OUTPATIENT)
Dept: UROLOGY | Age: 69
End: 2024-07-16

## 2024-07-16 DIAGNOSIS — R97.20 ELEVATED PROSTATE SPECIFIC ANTIGEN (PSA): ICD-10-CM

## 2024-07-16 LAB — PSA SERPL-MCNC: 0.2 NG/ML (ref 0–4)

## 2024-07-16 NOTE — TELEPHONE ENCOUNTER
----- Message from Markie Cortes DO sent at 7/15/2024  6:39 PM EDT -----  Please call the patient.  NST was ok, nothing major or concerning.  If having more angina (worsening PALACIOS, CP, SOB), please let us know.  Will review more at their follow up appointment and get plan for the future.    ThanksSebastian

## 2024-07-22 ENCOUNTER — TELEPHONE (OUTPATIENT)
Dept: UROLOGY | Age: 69
End: 2024-07-22

## 2024-07-22 ENCOUNTER — PREP FOR PROCEDURE (OUTPATIENT)
Dept: UROLOGY | Age: 69
End: 2024-07-22

## 2024-07-22 ENCOUNTER — PROCEDURE VISIT (OUTPATIENT)
Dept: UROLOGY | Age: 69
End: 2024-07-22
Payer: MEDICARE

## 2024-07-22 DIAGNOSIS — N21.0 CALCULUS IN BLADDER: ICD-10-CM

## 2024-07-22 DIAGNOSIS — N35.014 POST-TRAUMATIC MALE URETHRAL STRICTURE: ICD-10-CM

## 2024-07-22 DIAGNOSIS — N21.0 BLADDER STONE: ICD-10-CM

## 2024-07-22 DIAGNOSIS — C67.9 MALIGNANT NEOPLASM OF URINARY BLADDER, UNSPECIFIED SITE (HCC): Primary | ICD-10-CM

## 2024-07-22 DIAGNOSIS — N35.014 POST-TRAUMATIC MALE URETHRAL STRICTURE: Primary | ICD-10-CM

## 2024-07-22 LAB
BILIRUBIN, URINE, POC: NEGATIVE
BLOOD URINE, POC: NEGATIVE
GLUCOSE URINE, POC: NEGATIVE
KETONES, URINE, POC: NEGATIVE
LEUKOCYTE ESTERASE, URINE, POC: NEGATIVE
NITRITE, URINE, POC: NEGATIVE
PH, URINE, POC: 6 (ref 4.6–8)
PROTEIN,URINE, POC: 100
SPECIFIC GRAVITY, URINE, POC: 1.02 (ref 1–1.03)
URINALYSIS CLARITY, POC: NORMAL
URINALYSIS COLOR, POC: NORMAL
UROBILINOGEN, POC: NORMAL

## 2024-07-22 PROCEDURE — 81003 URINALYSIS AUTO W/O SCOPE: CPT | Performed by: UROLOGY

## 2024-07-22 PROCEDURE — 52000 CYSTOURETHROSCOPY: CPT | Performed by: UROLOGY

## 2024-07-22 NOTE — TELEPHONE ENCOUNTER
----- Message from Alden Thorpe Jr., MD sent at 7/22/2024 10:33 AM EDT -----  Schedule cystoscopy and urethral dilatiion.   Did orders

## 2024-07-22 NOTE — PROGRESS NOTES
file     Social Determinants of Health     Financial Resource Strain: Not on file   Food Insecurity: Not on file   Transportation Needs: Not on file   Physical Activity: Sufficiently Active (7/31/2023)    Exercise Vital Sign     Days of Exercise per Week: 7 days     Minutes of Exercise per Session: 30 min   Stress: Not on file   Social Connections: Not on file   Intimate Partner Violence: Not on file   Housing Stability: Not on file     Family History   Problem Relation Age of Onset    Hypertension Sister     Hypertension Brother     Hypertension Brother     Stroke Mother     Diabetes Mother     Heart Disease Father     Coronary Art Dis Father     Stroke Father     Hypertension Mother     Cancer Father         prostate       There were no vitals taken for this visit.  Physical Exam  General   Mental Status - Patient is alert and oriented X3. Build & Nutrition - Well nourished.      Chest and Lung Exam   Chest and lung exam reveals  - normal excursion with symmetric chest walls, quiet, even and easy respiratory effort with no use of accessory muscles and on auscultation, normal breath sounds, no adventitious sounds and normal vocal resonance.      Cardiovascular   Cardiovascular examination reveals  - normal heart sounds, regular rate and rhythm with no murmurs.      Abdomen   Palpation/Percussion: Palpation and Percussion of the abdomen reveal - Non Tender, No Rebound tenderness, No Rigidity (guarding), No hepatosplenomegaly, No Palpable abdominal masses and Soft. Hernia - Bilateral - No Hernia(s) present.    UA - Dipstick  Results for orders placed or performed in visit on 07/22/24   AMB POC URINALYSIS DIP STICK AUTO W/O MICRO   Result Value Ref Range    Color (UA POC)      Clarity (UA POC)      Glucose, Urine, POC Negative     Bilirubin, Urine, POC Negative     KETONES, Urine, POC Negative     Specific Gravity, Urine, POC 1.025 1.001 - 1.035    Blood (UA POC) Negative     pH, Urine, POC 6 4.6 - 8.0    Protein,

## 2024-07-24 ENCOUNTER — LAB (OUTPATIENT)
Dept: FAMILY MEDICINE CLINIC | Facility: CLINIC | Age: 69
End: 2024-07-24

## 2024-07-24 DIAGNOSIS — Z00.00 MEDICARE ANNUAL WELLNESS VISIT, SUBSEQUENT: ICD-10-CM

## 2024-07-24 DIAGNOSIS — E11.9 TYPE 2 DIABETES MELLITUS WITHOUT COMPLICATION, WITHOUT LONG-TERM CURRENT USE OF INSULIN (HCC): ICD-10-CM

## 2024-07-24 DIAGNOSIS — E78.00 HIGH CHOLESTEROL: ICD-10-CM

## 2024-07-24 DIAGNOSIS — I10 ESSENTIAL HYPERTENSION: Primary | ICD-10-CM

## 2024-07-24 LAB
ALBUMIN SERPL-MCNC: 3.9 G/DL (ref 3.2–4.6)
ALBUMIN/GLOB SERPL: 1.3 (ref 1–1.9)
ALP SERPL-CCNC: 77 U/L (ref 40–129)
ALT SERPL-CCNC: 21 U/L (ref 12–65)
ANION GAP SERPL CALC-SCNC: 11 MMOL/L (ref 9–18)
AST SERPL-CCNC: 30 U/L (ref 15–37)
BASOPHILS # BLD: 0 K/UL (ref 0–0.2)
BASOPHILS NFR BLD: 1 % (ref 0–2)
BILIRUB SERPL-MCNC: 0.7 MG/DL (ref 0–1.2)
BUN SERPL-MCNC: 19 MG/DL (ref 8–23)
CALCIUM SERPL-MCNC: 9.4 MG/DL (ref 8.8–10.2)
CHLORIDE SERPL-SCNC: 107 MMOL/L (ref 98–107)
CHOLEST SERPL-MCNC: 127 MG/DL (ref 0–200)
CO2 SERPL-SCNC: 23 MMOL/L (ref 20–28)
CREAT SERPL-MCNC: 0.9 MG/DL (ref 0.8–1.3)
CREAT UR-MCNC: 214 MG/DL (ref 39–259)
DIFFERENTIAL METHOD BLD: ABNORMAL
EOSINOPHIL # BLD: 0.1 K/UL (ref 0–0.8)
EOSINOPHIL NFR BLD: 1 % (ref 0.5–7.8)
ERYTHROCYTE [DISTWIDTH] IN BLOOD BY AUTOMATED COUNT: 15.1 % (ref 11.9–14.6)
EST. AVERAGE GLUCOSE BLD GHB EST-MCNC: 186 MG/DL
GLOBULIN SER CALC-MCNC: 3 G/DL (ref 2.3–3.5)
GLUCOSE SERPL-MCNC: 139 MG/DL (ref 70–99)
HBA1C MFR BLD: 8.1 % (ref 0–5.6)
HCT VFR BLD AUTO: 39 % (ref 41.1–50.3)
HDLC SERPL-MCNC: 43 MG/DL (ref 40–60)
HDLC SERPL: 3 (ref 0–5)
HGB BLD-MCNC: 12.1 G/DL (ref 13.6–17.2)
IMM GRANULOCYTES # BLD AUTO: 0 K/UL (ref 0–0.5)
IMM GRANULOCYTES NFR BLD AUTO: 0 % (ref 0–5)
LDLC SERPL CALC-MCNC: 67 MG/DL (ref 0–100)
LYMPHOCYTES # BLD: 2.3 K/UL (ref 0.5–4.6)
LYMPHOCYTES NFR BLD: 38 % (ref 13–44)
MCH RBC QN AUTO: 25.7 PG (ref 26.1–32.9)
MCHC RBC AUTO-ENTMCNC: 31 G/DL (ref 31.4–35)
MCV RBC AUTO: 83 FL (ref 82–102)
MICROALBUMIN UR-MCNC: 14.6 MG/DL (ref 0–20)
MICROALBUMIN/CREAT UR-RTO: 68 MG/G (ref 0–30)
MONOCYTES # BLD: 0.4 K/UL (ref 0.1–1.3)
MONOCYTES NFR BLD: 6 % (ref 4–12)
NEUTS SEG # BLD: 3.2 K/UL (ref 1.7–8.2)
NEUTS SEG NFR BLD: 54 % (ref 43–78)
NRBC # BLD: 0 K/UL (ref 0–0.2)
PLATELET # BLD AUTO: 176 K/UL (ref 150–450)
PMV BLD AUTO: 11.5 FL (ref 9.4–12.3)
POTASSIUM SERPL-SCNC: 3.6 MMOL/L (ref 3.5–5.1)
PROT SERPL-MCNC: 6.9 G/DL (ref 6.3–8.2)
RBC # BLD AUTO: 4.7 M/UL (ref 4.23–5.6)
SODIUM SERPL-SCNC: 141 MMOL/L (ref 136–145)
TRIGL SERPL-MCNC: 85 MG/DL (ref 0–150)
VLDLC SERPL CALC-MCNC: 17 MG/DL (ref 6–23)
WBC # BLD AUTO: 6 K/UL (ref 4.3–11.1)

## 2024-07-30 PROBLEM — N35.014 POST-TRAUMATIC MALE URETHRAL STRICTURE: Status: ACTIVE | Noted: 2024-07-22

## 2024-07-30 NOTE — TELEPHONE ENCOUNTER
Procedures: Procedure(s):   CYSTOSCOPY URETHRAL DILATATION   Date: 8/13/2024   Time: 1300   Location: [facility] MAIN OR 01 CYSTO     Scheduled.

## 2024-08-05 NOTE — PROGRESS NOTES
Patient verified name and .  Order for consent found in EHR and matches case posting; patient verifies procedure.   Type 1B surgery, Phone assessment complete.  Orders received.  Labs per surgeon: CBC, U/A, UCX  Labs per anesthesia protocol: POC Glucose DOS    Patient says will try and come in tomorrow for labs. Chart flagged for CN f/u    Patient answered medical/surgical history questions at their best of ability. All prior to admission medications documented in EPIC.    Patient instructed to continue taking all prescription medications up to the day of surgery but to take only the following medications the day of surgery according to anesthesia guidelines with a small sip of water:   Hydralazine (Apresoline)  Amlodipine (Norvasc)     Also, patient is requested to take 2 Tylenol in the morning and then again before bed on the day before surgery. Regular or extra strength may be used.       Patient informed that all vitamins and supplements should be held 7 days prior to surgery and NSAIDS 5 days prior to surgery.     Prescription meds to hold:  Do NOT take metformin the day of surgery    Patient instructed on the following:    > Arrive at [facility] Main Entrance, time of arrival to be called the day before by 1700  > NPO after midnight, unless otherwise indicated, including gum, mints, and ice chips  > Responsible adult must drive patient to the hospital, stay during surgery, and patient will need supervision 24 hours after anesthesia  > Use non moisturizing soap in shower the night before surgery and on the morning of surgery  > All piercings must be removed prior to arrival.    > Leave all valuables (money and jewelry) at home but bring insurance card and ID on DOS.   > Do not wear make-up, nail polish, lotions, cologne, perfumes, powders, or oil on skin. Artificial nails are not permitted.

## 2024-08-06 ENCOUNTER — HOSPITAL ENCOUNTER (OUTPATIENT)
Dept: LAB | Age: 69
Discharge: HOME | End: 2024-08-09
Payer: MEDICARE

## 2024-08-06 DIAGNOSIS — N35.014 POST-TRAUMATIC MALE URETHRAL STRICTURE: ICD-10-CM

## 2024-08-06 LAB
BASOPHILS # BLD: 0 K/UL (ref 0–0.2)
BASOPHILS NFR BLD: 1 % (ref 0–2)
DIFFERENTIAL METHOD BLD: ABNORMAL
EOSINOPHIL # BLD: 0.1 K/UL (ref 0–0.8)
EOSINOPHIL NFR BLD: 2 % (ref 0.5–7.8)
ERYTHROCYTE [DISTWIDTH] IN BLOOD BY AUTOMATED COUNT: 14.8 % (ref 11.9–14.6)
HCT VFR BLD AUTO: 38 % (ref 41.1–50.3)
HGB BLD-MCNC: 11.7 G/DL (ref 13.6–17.2)
IMM GRANULOCYTES # BLD AUTO: 0 K/UL (ref 0–0.5)
IMM GRANULOCYTES NFR BLD AUTO: 0 % (ref 0–5)
LYMPHOCYTES # BLD: 2.3 K/UL (ref 0.5–4.6)
LYMPHOCYTES NFR BLD: 40 % (ref 13–44)
MCH RBC QN AUTO: 25.4 PG (ref 26.1–32.9)
MCHC RBC AUTO-ENTMCNC: 30.8 G/DL (ref 31.4–35)
MCV RBC AUTO: 82.4 FL (ref 82–102)
MONOCYTES # BLD: 0.5 K/UL (ref 0.1–1.3)
MONOCYTES NFR BLD: 8 % (ref 4–12)
NEUTS SEG # BLD: 2.8 K/UL (ref 1.7–8.2)
NEUTS SEG NFR BLD: 50 % (ref 43–78)
NRBC # BLD: 0 K/UL (ref 0–0.2)
PLATELET # BLD AUTO: 175 K/UL (ref 150–450)
PMV BLD AUTO: 11.4 FL (ref 9.4–12.3)
RBC # BLD AUTO: 4.61 M/UL (ref 4.23–5.6)
WBC # BLD AUTO: 5.7 K/UL (ref 4.3–11.1)

## 2024-08-06 PROCEDURE — 36415 COLL VENOUS BLD VENIPUNCTURE: CPT

## 2024-08-06 PROCEDURE — 85025 COMPLETE CBC W/AUTO DIFF WBC: CPT

## 2024-08-06 NOTE — PROGRESS NOTES
CBC within anesthesia guidelines, no follow-up required. Labs automatically routed to ordering provider via Epic documentation.  Confirmed with Dalia in [facility] that urine was not collected. Left voicemail for patient to return to lab for recollection.

## 2024-08-07 ENCOUNTER — HOSPITAL ENCOUNTER (OUTPATIENT)
Dept: LAB | Age: 69
Discharge: HOME | End: 2024-08-10
Payer: MEDICARE

## 2024-08-07 DIAGNOSIS — N35.014 POST-TRAUMATIC MALE URETHRAL STRICTURE: ICD-10-CM

## 2024-08-07 LAB
APPEARANCE UR: CLEAR
BACTERIA URNS QL MICRO: NEGATIVE /HPF
BILIRUB UR QL: NEGATIVE
COLOR UR: ABNORMAL
EPI CELLS #/AREA URNS HPF: ABNORMAL /HPF
GLUCOSE UR STRIP.AUTO-MCNC: NEGATIVE MG/DL
HGB UR QL STRIP: NEGATIVE
HYALINE CASTS URNS QL MICRO: ABNORMAL /LPF
KETONES UR QL STRIP.AUTO: NEGATIVE MG/DL
LEUKOCYTE ESTERASE UR QL STRIP.AUTO: NEGATIVE
NITRITE UR QL STRIP.AUTO: NEGATIVE
PH UR STRIP: 7 (ref 5–9)
PROT UR STRIP-MCNC: ABNORMAL MG/DL
RBC #/AREA URNS HPF: ABNORMAL /HPF
SP GR UR REFRACTOMETRY: 1.01 (ref 1–1.02)
UROBILINOGEN UR QL STRIP.AUTO: 1 EU/DL (ref 0.2–1)
WBC URNS QL MICRO: ABNORMAL /HPF

## 2024-08-07 PROCEDURE — 87086 URINE CULTURE/COLONY COUNT: CPT

## 2024-08-07 PROCEDURE — 81001 URINALYSIS AUTO W/SCOPE: CPT

## 2024-08-08 NOTE — PROGRESS NOTES
UA within anesthesia guidelines, no follow-up required. Labs automatically routed to ordering provider via Epic documentation.

## 2024-08-09 LAB
BACTERIA SPEC CULT: NORMAL
SERVICE CMNT-IMP: NORMAL

## 2024-08-12 ENCOUNTER — ANESTHESIA EVENT (OUTPATIENT)
Dept: SURGERY | Age: 69
End: 2024-08-12
Payer: COMMERCIAL

## 2024-08-13 ENCOUNTER — ANESTHESIA (OUTPATIENT)
Dept: SURGERY | Age: 69
End: 2024-08-13
Payer: COMMERCIAL

## 2024-08-13 ENCOUNTER — HOSPITAL ENCOUNTER (OUTPATIENT)
Age: 69
Setting detail: OUTPATIENT SURGERY
Discharge: HOME | End: 2024-08-13
Attending: UROLOGY | Admitting: UROLOGY
Payer: COMMERCIAL

## 2024-08-13 VITALS
OXYGEN SATURATION: 98 % | SYSTOLIC BLOOD PRESSURE: 171 MMHG | TEMPERATURE: 98 F | HEIGHT: 71 IN | HEART RATE: 76 BPM | BODY MASS INDEX: 32.62 KG/M2 | WEIGHT: 233 LBS | DIASTOLIC BLOOD PRESSURE: 78 MMHG | RESPIRATION RATE: 16 BRPM

## 2024-08-13 LAB
GLUCOSE BLD STRIP.AUTO-MCNC: 112 MG/DL (ref 65–100)
SERVICE CMNT-IMP: ABNORMAL

## 2024-08-13 PROCEDURE — 7100000010 HC PHASE II RECOVERY - FIRST 15 MIN: Performed by: UROLOGY

## 2024-08-13 PROCEDURE — C1894 INTRO/SHEATH, NON-LASER: HCPCS | Performed by: UROLOGY

## 2024-08-13 PROCEDURE — 2580000003 HC RX 258: Performed by: ANESTHESIOLOGY

## 2024-08-13 PROCEDURE — 3700000001 HC ADD 15 MINUTES (ANESTHESIA): Performed by: UROLOGY

## 2024-08-13 PROCEDURE — 6360000002 HC RX W HCPCS: Performed by: UROLOGY

## 2024-08-13 PROCEDURE — 7100000011 HC PHASE II RECOVERY - ADDTL 15 MIN: Performed by: UROLOGY

## 2024-08-13 PROCEDURE — 3700000000 HC ANESTHESIA ATTENDED CARE: Performed by: UROLOGY

## 2024-08-13 PROCEDURE — 6360000002 HC RX W HCPCS: Performed by: NURSE ANESTHETIST, CERTIFIED REGISTERED

## 2024-08-13 PROCEDURE — 3600000004 HC SURGERY LEVEL 4 BASE: Performed by: UROLOGY

## 2024-08-13 PROCEDURE — 2500000003 HC RX 250 WO HCPCS: Performed by: NURSE ANESTHETIST, CERTIFIED REGISTERED

## 2024-08-13 PROCEDURE — 7100000000 HC PACU RECOVERY - FIRST 15 MIN: Performed by: UROLOGY

## 2024-08-13 PROCEDURE — 2709999900 HC NON-CHARGEABLE SUPPLY: Performed by: UROLOGY

## 2024-08-13 PROCEDURE — 82962 GLUCOSE BLOOD TEST: CPT

## 2024-08-13 PROCEDURE — 6370000000 HC RX 637 (ALT 250 FOR IP): Performed by: ANESTHESIOLOGY

## 2024-08-13 PROCEDURE — 7100000001 HC PACU RECOVERY - ADDTL 15 MIN: Performed by: UROLOGY

## 2024-08-13 PROCEDURE — C1769 GUIDE WIRE: HCPCS | Performed by: UROLOGY

## 2024-08-13 PROCEDURE — 3600000014 HC SURGERY LEVEL 4 ADDTL 15MIN: Performed by: UROLOGY

## 2024-08-13 RX ORDER — OXYCODONE HYDROCHLORIDE 5 MG/1
5 TABLET ORAL PRN
Status: DISCONTINUED | OUTPATIENT
Start: 2024-08-13 | End: 2024-08-13 | Stop reason: HOSPADM

## 2024-08-13 RX ORDER — SODIUM CHLORIDE 0.9 % (FLUSH) 0.9 %
5-40 SYRINGE (ML) INJECTION PRN
Status: DISCONTINUED | OUTPATIENT
Start: 2024-08-13 | End: 2024-08-13 | Stop reason: HOSPADM

## 2024-08-13 RX ORDER — HYDROMORPHONE HYDROCHLORIDE 2 MG/ML
0.5 INJECTION, SOLUTION INTRAMUSCULAR; INTRAVENOUS; SUBCUTANEOUS EVERY 5 MIN PRN
Status: DISCONTINUED | OUTPATIENT
Start: 2024-08-13 | End: 2024-08-13 | Stop reason: HOSPADM

## 2024-08-13 RX ORDER — OXYCODONE HYDROCHLORIDE 5 MG/1
10 TABLET ORAL PRN
Status: DISCONTINUED | OUTPATIENT
Start: 2024-08-13 | End: 2024-08-13 | Stop reason: HOSPADM

## 2024-08-13 RX ORDER — NALOXONE HYDROCHLORIDE 0.4 MG/ML
INJECTION, SOLUTION INTRAMUSCULAR; INTRAVENOUS; SUBCUTANEOUS PRN
Status: DISCONTINUED | OUTPATIENT
Start: 2024-08-13 | End: 2024-08-13 | Stop reason: HOSPADM

## 2024-08-13 RX ORDER — SODIUM CHLORIDE 0.9 % (FLUSH) 0.9 %
5-40 SYRINGE (ML) INJECTION EVERY 12 HOURS SCHEDULED
Status: DISCONTINUED | OUTPATIENT
Start: 2024-08-13 | End: 2024-08-13 | Stop reason: HOSPADM

## 2024-08-13 RX ORDER — SODIUM CHLORIDE 9 MG/ML
INJECTION, SOLUTION INTRAVENOUS PRN
Status: DISCONTINUED | OUTPATIENT
Start: 2024-08-13 | End: 2024-08-13 | Stop reason: HOSPADM

## 2024-08-13 RX ORDER — PROPOFOL 10 MG/ML
INJECTION, EMULSION INTRAVENOUS PRN
Status: DISCONTINUED | OUTPATIENT
Start: 2024-08-13 | End: 2024-08-13 | Stop reason: SDUPTHER

## 2024-08-13 RX ORDER — LIDOCAINE HYDROCHLORIDE 20 MG/ML
INJECTION, SOLUTION EPIDURAL; INFILTRATION; INTRACAUDAL; PERINEURAL PRN
Status: DISCONTINUED | OUTPATIENT
Start: 2024-08-13 | End: 2024-08-13 | Stop reason: SDUPTHER

## 2024-08-13 RX ORDER — ONDANSETRON 2 MG/ML
4 INJECTION INTRAMUSCULAR; INTRAVENOUS
Status: DISCONTINUED | OUTPATIENT
Start: 2024-08-13 | End: 2024-08-13 | Stop reason: HOSPADM

## 2024-08-13 RX ORDER — SODIUM CHLORIDE, SODIUM LACTATE, POTASSIUM CHLORIDE, CALCIUM CHLORIDE 600; 310; 30; 20 MG/100ML; MG/100ML; MG/100ML; MG/100ML
INJECTION, SOLUTION INTRAVENOUS CONTINUOUS
Status: DISCONTINUED | OUTPATIENT
Start: 2024-08-13 | End: 2024-08-13 | Stop reason: HOSPADM

## 2024-08-13 RX ORDER — HYDROMORPHONE HYDROCHLORIDE 2 MG/ML
0.25 INJECTION, SOLUTION INTRAMUSCULAR; INTRAVENOUS; SUBCUTANEOUS EVERY 5 MIN PRN
Status: DISCONTINUED | OUTPATIENT
Start: 2024-08-13 | End: 2024-08-13 | Stop reason: HOSPADM

## 2024-08-13 RX ORDER — ACETAMINOPHEN 500 MG
1000 TABLET ORAL ONCE
Status: COMPLETED | OUTPATIENT
Start: 2024-08-13 | End: 2024-08-13

## 2024-08-13 RX ORDER — DIPHENHYDRAMINE HYDROCHLORIDE 50 MG/ML
12.5 INJECTION INTRAMUSCULAR; INTRAVENOUS
Status: DISCONTINUED | OUTPATIENT
Start: 2024-08-13 | End: 2024-08-13 | Stop reason: HOSPADM

## 2024-08-13 RX ORDER — PROCHLORPERAZINE EDISYLATE 5 MG/ML
5 INJECTION INTRAMUSCULAR; INTRAVENOUS
Status: DISCONTINUED | OUTPATIENT
Start: 2024-08-13 | End: 2024-08-13 | Stop reason: HOSPADM

## 2024-08-13 RX ORDER — MIDAZOLAM HYDROCHLORIDE 2 MG/2ML
2 INJECTION, SOLUTION INTRAMUSCULAR; INTRAVENOUS
Status: DISCONTINUED | OUTPATIENT
Start: 2024-08-13 | End: 2024-08-13 | Stop reason: HOSPADM

## 2024-08-13 RX ORDER — ONDANSETRON 2 MG/ML
INJECTION INTRAMUSCULAR; INTRAVENOUS PRN
Status: DISCONTINUED | OUTPATIENT
Start: 2024-08-13 | End: 2024-08-13 | Stop reason: SDUPTHER

## 2024-08-13 RX ORDER — LIDOCAINE HYDROCHLORIDE 10 MG/ML
1 INJECTION, SOLUTION INFILTRATION; PERINEURAL
Status: DISCONTINUED | OUTPATIENT
Start: 2024-08-13 | End: 2024-08-13 | Stop reason: HOSPADM

## 2024-08-13 RX ADMIN — ACETAMINOPHEN 1000 MG: 500 TABLET, FILM COATED ORAL at 11:20

## 2024-08-13 RX ADMIN — Medication 2000 MG: at 14:27

## 2024-08-13 RX ADMIN — LIDOCAINE HYDROCHLORIDE 100 MG: 20 INJECTION, SOLUTION EPIDURAL; INFILTRATION; INTRACAUDAL; PERINEURAL at 14:23

## 2024-08-13 RX ADMIN — PROPOFOL 200 MG: 10 INJECTION, EMULSION INTRAVENOUS at 14:23

## 2024-08-13 RX ADMIN — FENTANYL CITRATE 25 MCG: 50 INJECTION INTRAMUSCULAR; INTRAVENOUS at 14:23

## 2024-08-13 RX ADMIN — SODIUM CHLORIDE, POTASSIUM CHLORIDE, SODIUM LACTATE AND CALCIUM CHLORIDE: 600; 310; 30; 20 INJECTION, SOLUTION INTRAVENOUS at 11:21

## 2024-08-13 RX ADMIN — ONDANSETRON 4 MG: 2 INJECTION INTRAMUSCULAR; INTRAVENOUS at 14:39

## 2024-08-13 RX ADMIN — FENTANYL CITRATE 25 MCG: 50 INJECTION INTRAMUSCULAR; INTRAVENOUS at 14:36

## 2024-08-13 ASSESSMENT — PAIN - FUNCTIONAL ASSESSMENT: PAIN_FUNCTIONAL_ASSESSMENT: 0-10

## 2024-08-13 NOTE — ANESTHESIA POSTPROCEDURE EVALUATION
Department of Anesthesiology  Postprocedure Note    Patient: Sai Emmanuel Jr.  MRN: [id number]  YOB: 1955  Date of evaluation: 8/13/2024    Procedure Summary       Date: 08/13/24 Room / Location: [facility] MAIN OR  CYSTO / [facility] MAIN OR    Anesthesia Start: 1412 Anesthesia Stop: 1507    Procedure: CYSTOSCOPY URETHRAL DILATION HERNANDEZ PLACEMENT (Ureter) Diagnosis:       Post-traumatic male urethral stricture      (Post-traumatic male urethral stricture [N35.014])    Providers: Alden Thorpe Jr., MD Responsible Provider: Dar Bradley IV, MD    Anesthesia Type: general ASA Status: 2            Anesthesia Type: No value filed.    Natalia Phase I: Natalia Score: 7    Natalia Phase II: Natalia Score: 10    Anesthesia Post Evaluation    Patient location during evaluation: PACU  Patient participation: complete - patient participated  Level of consciousness: awake and alert  Airway patency: patent  Nausea & Vomiting: no nausea and no vomiting  Cardiovascular status: hemodynamically stable  Respiratory status: acceptable, nonlabored ventilation and spontaneous ventilation  Hydration status: euvolemic  Comments: BP (!) 171/78   Pulse 76   Temp 98 °F (36.7 °C) (Temporal)   Resp 16   Ht 1.803 m (5' 11\")   Wt 105.7 kg (233 lb)   SpO2 98%   BMI 32.50 kg/m²     Multimodal analgesia pain management approach  Pain management: adequate and satisfactory to patient        No notable events documented.

## 2024-08-13 NOTE — OP NOTE
[address]  [address]                            OPERATIVE REPORT      PATIENT NAME: VANNESSA ALTAMIRANO     : 1955  MED REC NO: [id number]                       ROOM: [facility] NO: [id number]                       ADMIT DATE: 2024  PROVIDER: Alden Thorpe Jr, MD    DATE OF SERVICE:  2024    PREOPERATIVE DIAGNOSES:  Urethral stricture.    POSTOPERATIVE DIAGNOSES:  Urethral stricture.    PROCEDURES PERFORMED:  Cystoscopy with urethral dilation and complicated Flannery catheter placement.    SURGEON:  Alden Thorpe Jr, MD    ASSISTANT:  None.    ANESTHESIA:  General.    ESTIMATED BLOOD LOSS:  ***    SPECIMENS REMOVED:  None.    INTRAOPERATIVE FINDINGS:  Bulbar urethral stricture well dilated.  Prostate fossa is status post TURP and is open.     COMPLICATIONS:  None.    IMPLANTS:  None.    INDICATIONS:  The patient with obstructive voiding symptoms found to have a urethral stricture by cystoscopy.    DESCRIPTION OF PROCEDURE:  The patient was given a general anesthetic, placed in the dorsal lithotomy position.  He was prepped and draped in a sterile fashion.  A 22-Kittitian cystoscope was advanced into the urethra using video camera and 30-degree lens.  There was a stricture in the bulbar urethra measuring about 8-Kittitian in diameter.  I could not pass a floppy wire through this area initially.  I then used a Sensor guidewire.  I was able to manipulate the wire, so they pass through the opening.  Fluoroscopy was used to confirm the wire placement in the bladder.    At this point, we used Amplatz dilators to dilate the stricture over the wire.  I first dilated up to 12-Kittitian and then passed a 10-Kittitian long catheter over the wire.  Over that, I passed Amplatz dilators from 14-Kittitian up to 24-Kittitian using fluoroscopic guidance.    At this point, the dilators were removed and the wire was left in place.  The scope was

## 2024-08-13 NOTE — PERIOP NOTE
Pt and wife given discharge instructions at bedside, both verbalize understanding. All questions answered.

## 2024-08-13 NOTE — H&P
Drug use: No    Sexual activity: Not on file   Other Topics Concern    Not on file   Social History Narrative    Not on file      Social Determinants of Health           Financial Resource Strain: Not on file   Food Insecurity: Not on file   Transportation Needs: Not on file   Physical Activity: Sufficiently Active (7/31/2023)     Exercise Vital Sign      Days of Exercise per Week: 7 days      Minutes of Exercise per Session: 30 min   Stress: Not on file   Social Connections: Not on file   Intimate Partner Violence: Not on file   Housing Stability: Not on file         Family History         Family History   Problem Relation Age of Onset    Hypertension Sister      Hypertension Brother      Hypertension Brother      Stroke Mother      Diabetes Mother      Heart Disease Father      Coronary Art Dis Father      Stroke Father      Hypertension Mother      Cancer Father           prostate            There were no vitals taken for this visit.  Physical Exam  General   Mental Status - Patient is alert and oriented X3. Build & Nutrition - Well nourished.        Chest and Lung Exam   Chest and lung exam reveals  - normal excursion with symmetric chest walls, quiet, even and easy respiratory effort with no use of accessory muscles and on auscultation, normal breath sounds, no adventitious sounds and normal vocal resonance.        Cardiovascular   Cardiovascular examination reveals  - normal heart sounds, regular rate and rhythm with no murmurs.        Abdomen   Palpation/Percussion: Palpation and Percussion of the abdomen reveal - Non Tender, No Rebound tenderness, No Rigidity (guarding), No hepatosplenomegaly, No Palpable abdominal masses and Soft. Hernia - Bilateral - No Hernia(s) present.             Cystoscopy Procedure Note     Procedure Details   The risks, benefits, complications, treatment options, and expected outcomes were discussed with the patient. The patient concurred with the proposed plan, giving informed

## 2024-08-13 NOTE — ANESTHESIA PRE PROCEDURE
Plan      general     ASA 2     (Has done well with LMA in past.  If intubation needed, plan Glidescope.)  Induction: intravenous.    MIPS: Postoperative opioids intended and Prophylactic antiemetics administered.  Anesthetic plan and risks discussed with patient and spouse.                        JOSÉ MIGUEL NELSON IV, MD   8/13/2024

## 2024-08-13 NOTE — DISCHARGE INSTRUCTIONS
If you have had surgery in the past 7-10 days by one of our providers and are having fever, bleeding, or drainage from an incision, have an opening in an incision, or having issues urinating properly, please call [phone].      Cystoscopy: What to Expect at Home  Your Recovery  A cystoscopy is a procedure that lets a doctor look inside of the bladder and the urethra. The urethra is the tube that carries urine from the bladder to outside the body. The doctor uses a thin, lighted tube called a cystoscope to look for kidney or bladder stones, tumors, bleeding, or infection. After the cystoscopy, your urethra may be sore at first, and it may burn when you urinate for the first few days after the procedure. You may feel the need to urinate more often, and your urine may be pink. These symptoms should get better in 1 or 2 days. You will probably be able to go back to work or most of your usual activities in 1 or 2 days.    How can you care for yourself at home?  Activity  Rest when you feel tired. Getting enough sleep will help you recover.  Try to walk each day. Start by walking a little more than you did the day before. Bit by bit, increase the amount you walk. Walking boosts blood flow and helps prevent pneumonia and constipation.  Avoid strenuous activities, such as bicycle riding, jogging, weight lifting, or aerobic exercise, until your doctor says it is okay.  Ask your doctor when you can drive again.  Most people are able to return to work within 1 or 2 days after the procedure.  You may shower and take baths as usual.  Ask your doctor when it is okay for you to have sex.  Diet  You can eat your normal diet. If your stomach is upset, try bland, low-fat foods like plain rice, broiled chicken, toast, and yogurt.  Drink plenty of fluids (unless your doctor tells you not to).  Medicines  Take pain medicines exactly as directed.  If the doctor gave you a prescription medicine for pain, take it as prescribed.  If you

## 2024-08-13 NOTE — BRIEF OP NOTE
Brief Postoperative Note      Patient: Sai Emmanuel Jr.  YOB: 1955  MRN: [id number]    Date of Procedure: 8/13/2024    Pre-Op Diagnosis Codes:      * Post-traumatic male urethral stricture [N35.014]    Post-Op Diagnosis: Same       Procedure(s):  CYSTOSCOPY URETHRAL DILATION HERNANDEZ PLACEMENT    Surgeon(s):  Haris Thorpe Jr., MD    Assistant:  * No surgical staff found *    Anesthesia: General    Estimated Blood Loss (mL): Minimal    Complications: None    Specimens:   * No specimens in log *    Implants:  * No implants in log *      Drains:   Urinary Catheter 08/13/24 [address] (Active)       Findings:  Infection Present At Time Of Surgery (PATOS) (choose all levels that have infection present):  No infection present  Other Findings: see op note    Electronically signed by HARIS THORPE JR, MD on 8/13/2024 at 2:54 PM

## 2024-08-19 RX ORDER — METFORMIN HYDROCHLORIDE 500 MG/1
1000 TABLET, EXTENDED RELEASE ORAL 2 TIMES DAILY
Qty: 90 TABLET | Refills: 0 | Status: SHIPPED | OUTPATIENT
Start: 2024-08-19

## 2024-08-19 NOTE — TELEPHONE ENCOUNTER
Patient came by the office- he had been to the pharmacy to get his metformin xr refilled and they told him that med had been discontinued. He takes this daily. Upon looking in the chart, looks like this was d/c at the urology office last week. Can someone please send a new rx to [facility] in [place] on [address]? Patient can be reached at [phone].

## 2024-08-20 ENCOUNTER — OFFICE VISIT (OUTPATIENT)
Dept: UROLOGY | Age: 69
End: 2024-08-20
Payer: COMMERCIAL

## 2024-08-20 DIAGNOSIS — N35.014 POST-TRAUMATIC MALE URETHRAL STRICTURE: Primary | ICD-10-CM

## 2024-08-20 PROCEDURE — 3017F COLORECTAL CA SCREEN DOC REV: CPT | Performed by: NURSE PRACTITIONER

## 2024-08-20 PROCEDURE — 99213 OFFICE O/P EST LOW 20 MIN: CPT | Performed by: NURSE PRACTITIONER

## 2024-08-20 PROCEDURE — 1036F TOBACCO NON-USER: CPT | Performed by: NURSE PRACTITIONER

## 2024-08-20 PROCEDURE — 1123F ACP DISCUSS/DSCN MKR DOCD: CPT | Performed by: NURSE PRACTITIONER

## 2024-08-20 PROCEDURE — G8417 CALC BMI ABV UP PARAM F/U: HCPCS | Performed by: NURSE PRACTITIONER

## 2024-08-20 PROCEDURE — G8427 DOCREV CUR MEDS BY ELIG CLIN: HCPCS | Performed by: NURSE PRACTITIONER

## 2024-08-20 RX ORDER — TRIAMTERENE AND HYDROCHLOROTHIAZIDE 37.5; 25 MG/1; MG/1
1 TABLET ORAL DAILY
COMMUNITY

## 2024-08-20 RX ORDER — POTASSIUM CHLORIDE 29.8 MG/ML
5 INJECTION INTRAVENOUS ONCE
COMMUNITY

## 2024-08-20 RX ORDER — SIMVASTATIN 5 MG
20 TABLET ORAL NIGHTLY
COMMUNITY

## 2024-08-20 ASSESSMENT — ENCOUNTER SYMPTOMS: BACK PAIN: 0

## 2024-08-20 NOTE — PROGRESS NOTES
[facility]  [address] [place] [id number]  [phone]          Sai Emmanuel .  : 1955    Chief Complaint   Patient presents with    Urinary Retention    Follow-up          HPI   With hx of bladder cancer, elevated PSA.   S/p TURBT 10-8-12, for stage T1 bladder cancer. completed BCG maintenance. s/p TURP/litholapaxy in .     Had a papillary tumor at the right dome and right lateral wall.   Pathology shows papillary high-grade urothelial carcinoma with   focal superficial lamina propria invasion from the bladder dome   tumor. Muscularis propria was not identified.   We also did a separate biopsy of the bladder dome adjacent to the   tumor. It shows high grade urothelial carcinoma with associated   superficial lamina propria invasion, muscularis propria not   identified.   The right lateral wall tumor shows papillary high-grade   urothelial carcinoma, definitive features of invasion are not   identified, muscularis propria is not identified.   We did a re-biopsy of the resection base. There is focally   necrotic tissue present, no carcinoma noted.   Completed 6 week induction course of BCG in .   Follow up cysto showed tumor on left dome, flat red area right   dome.   S/P TURBT on 13. Papillary tumor left dome shows high grade   papillary tumor, no invasion. Right dome flat area shows focal   features of high grade urothelial carcinoma, Separate fragment of dysplastic urothelium. Small fragment of muscularis present,   uninvolved by tumor.   Cystoscopy in 2013 shows BPH, 4+ trabeculation, no tumors.   Atypical cytology with negative fish test.--S/P Cystoscopy with   bladder biopsy on 10/17/13. Pathology revealed urothelial atypia.     Has fnished 6 weeks of BCG at end of .  Continues on Flomax. Completed  BCG maintenance.   Cytology in  showed atypical cells, but negative FISH. Had 3   week BCG in .   Negative cystoscopy in

## 2024-08-21 RX ORDER — METFORMIN HCL 500 MG
1000 TABLET, EXTENDED RELEASE 24 HR ORAL 2 TIMES DAILY
Qty: 368 TABLET | OUTPATIENT
Start: 2024-08-21

## 2024-08-30 ENCOUNTER — OFFICE VISIT (OUTPATIENT)
Age: 69
End: 2024-08-30
Payer: COMMERCIAL

## 2024-08-30 VITALS
HEIGHT: 71 IN | DIASTOLIC BLOOD PRESSURE: 90 MMHG | SYSTOLIC BLOOD PRESSURE: 144 MMHG | HEART RATE: 68 BPM | BODY MASS INDEX: 32.06 KG/M2 | WEIGHT: 229 LBS

## 2024-08-30 DIAGNOSIS — I10 ESSENTIAL HYPERTENSION: ICD-10-CM

## 2024-08-30 DIAGNOSIS — R00.2 PALPITATION: ICD-10-CM

## 2024-08-30 DIAGNOSIS — I25.10 ATHEROSCLEROSIS OF NATIVE CORONARY ARTERY OF NATIVE HEART WITHOUT ANGINA PECTORIS: Primary | ICD-10-CM

## 2024-08-30 DIAGNOSIS — G47.33 OSA (OBSTRUCTIVE SLEEP APNEA): ICD-10-CM

## 2024-08-30 PROCEDURE — 99214 OFFICE O/P EST MOD 30 MIN: CPT | Performed by: INTERNAL MEDICINE

## 2024-08-30 PROCEDURE — 1036F TOBACCO NON-USER: CPT | Performed by: INTERNAL MEDICINE

## 2024-08-30 PROCEDURE — G8428 CUR MEDS NOT DOCUMENT: HCPCS | Performed by: INTERNAL MEDICINE

## 2024-08-30 PROCEDURE — 1123F ACP DISCUSS/DSCN MKR DOCD: CPT | Performed by: INTERNAL MEDICINE

## 2024-08-30 PROCEDURE — G8417 CALC BMI ABV UP PARAM F/U: HCPCS | Performed by: INTERNAL MEDICINE

## 2024-08-30 PROCEDURE — 3077F SYST BP >= 140 MM HG: CPT | Performed by: INTERNAL MEDICINE

## 2024-08-30 PROCEDURE — 3017F COLORECTAL CA SCREEN DOC REV: CPT | Performed by: INTERNAL MEDICINE

## 2024-08-30 PROCEDURE — 3080F DIAST BP >= 90 MM HG: CPT | Performed by: INTERNAL MEDICINE

## 2024-08-30 NOTE — PROGRESS NOTES
[address] [facility], [place] [address]  PHONE: [phone]     24    NAME:  Sai Emmanuel Jr.  : 1955  MRN: [id number]       SUBJECTIVE:   Sai Emmanuel Jr. is a 68 y.o. male seen for a follow up visit regarding the following:     Chief Complaint   Patient presents with    Coronary Artery Disease    Follow-up       HPI: Here for CAD.   CAD (mild CAD  [facility])   HTN (Ace cough)  Echo 10/2022: normal EF, LVH  NST 2024: Findings suggest a low risk of cardiac events.      BP better at home.    NO more CP, no new PALACIOS.  Feeling better now.  NO new edema.    Home -130s.   NO new palp.    Patient denies recent history of orthopnea, PND, excessive dizziness and/or syncope.      Followed for bladder CA, no issues, followed yearly now.         Past Medical History, Past Surgical History, Family history, Social History, and Medications were all reviewed with the patient today and updated as necessary.     Current Outpatient Medications   Medication Sig Dispense Refill    simvastatin (ZOCOR) 5 MG tablet Take 4 tablets by mouth nightly      triamterene-hydroCHLOROthiazide (MAXZIDE-25) 37.5-25 MG per tablet Take 1 tablet by mouth daily      metFORMIN (GLUCOPHAGE-XR) 500 MG extended release tablet Take 2 tablets by mouth in the morning and at bedtime 90 tablet 0    potassium chloride 2 MEQ/ML injection Infuse 5 mLs intravenously once       No current facility-administered medications for this visit.        Allergies   Allergen Reactions    Macadamia Nut Oil Anaphylaxis     Tongue and throat swelling with almonds. States that he recently noticed symptoms with eating peanuts.       Ace Inhibitors Other (See Comments)    [employer] Oil Angioedema    Codeine Nausea And Vomiting     Patient Active Problem List    Diagnosis Date Noted    Palpitation 2022     Priority: Medium    Post-traumatic male urethral stricture 2024    Bladder stone 2022     Added  TRIG 66 07/24/2023     Lab Results   Component Value Date    HDL 43 07/24/2024    HDL 48 01/24/2024    HDL 51 07/24/2023     No components found for: \"LDLCHOLESTEROL\", \"LDLCALC\"  Lab Results   Component Value Date    VLDL 17 07/24/2024    VLDL 18 01/24/2024    VLDL 13.2 07/24/2023     Lab Results   Component Value Date    CHOLHDLRATIO 3.0 07/24/2024    CHOLHDLRATIO 2.5 01/24/2024    CHOLHDLRATIO 2.4 07/24/2023     Lab Results   Component Value Date    LDL 67 07/24/2024           10/12/22    TRANSTHORACIC ECHOCARDIOGRAM (TTE) COMPLETE (CONTRAST/BUBBLE/3D PRN) 10/20/2022 10:49 PM, 10/20/2022 12:00 AM (Final)    Interpretation Summary    Left Ventricle: Normal left ventricular systolic function with a visually estimated EF of 60 - 65%. Left ventricle size is normal. Mildly increased wall thickness. Normal wall motion. Abnormal diastolic function.    Aortic Valve: Mild sclerosis of the aortic valve cusp.    Left Atrium: Left atrium is mildly dilated. LA Vol Index is  29 ml/m2.    Signed by: Markie Cortes, DO on 10/20/2022 10:49 PM, Signed by: Unknown Provider Result on 10/20/2022 12:00 AM        I have Independently reviewed prior care notes, any ER records available, cardiac testing, labs and results with the patient and before seeing the patient today.  Also independently reviewed outside records when available.       ASSESSMENT:    Sai was seen today for coronary artery disease and follow-up.    Diagnoses and all orders for this visit:    Atherosclerosis of native coronary artery of native heart without angina pectoris    Essential hypertension    Palpitation    AMY (obstructive sleep apnea)          PLAN:   1. HTN:   better now, white coat HTN.    On low salt diet.    On hyd 100 BID, diovan.    [place] tired on clonidine.     Better as reviewed.  Follow at home.       Added norvasc 10.    BP better at home as reviewed.     Instructed patient to follow low sodium diet.  Monitor BP at home, will review at follow

## 2024-09-17 ENCOUNTER — OFFICE VISIT (OUTPATIENT)
Dept: FAMILY MEDICINE CLINIC | Facility: CLINIC | Age: 69
End: 2024-09-17

## 2024-09-17 VITALS
HEIGHT: 71 IN | SYSTOLIC BLOOD PRESSURE: 152 MMHG | OXYGEN SATURATION: 98 % | DIASTOLIC BLOOD PRESSURE: 78 MMHG | RESPIRATION RATE: 16 BRPM | BODY MASS INDEX: 32.06 KG/M2 | HEART RATE: 88 BPM | TEMPERATURE: 97.2 F | WEIGHT: 229 LBS

## 2024-09-17 DIAGNOSIS — I25.10 ATHEROSCLEROSIS OF NATIVE CORONARY ARTERY OF NATIVE HEART WITHOUT ANGINA PECTORIS: ICD-10-CM

## 2024-09-17 DIAGNOSIS — R79.89 LOW VITAMIN D LEVEL: ICD-10-CM

## 2024-09-17 DIAGNOSIS — E11.9 TYPE 2 DIABETES MELLITUS WITHOUT COMPLICATION, WITHOUT LONG-TERM CURRENT USE OF INSULIN (HCC): ICD-10-CM

## 2024-09-17 DIAGNOSIS — Z00.00 MEDICARE ANNUAL WELLNESS VISIT, SUBSEQUENT: Primary | ICD-10-CM

## 2024-09-17 DIAGNOSIS — C67.1 MALIGNANT NEOPLASM OF DOME OF URINARY BLADDER (HCC): ICD-10-CM

## 2024-09-17 DIAGNOSIS — E66.9 OBESITY (BMI 30.0-34.9): ICD-10-CM

## 2024-09-17 DIAGNOSIS — E78.00 HIGH CHOLESTEROL: ICD-10-CM

## 2024-09-17 DIAGNOSIS — I10 ESSENTIAL HYPERTENSION: ICD-10-CM

## 2024-09-17 LAB
25(OH)D3 SERPL-MCNC: 29.7 NG/ML (ref 30–100)
CREAT UR-MCNC: 161 MG/DL (ref 39–259)
MICROALBUMIN UR-MCNC: 16.8 MG/DL (ref 0–20)
MICROALBUMIN/CREAT UR-RTO: 104 MG/G (ref 0–30)

## 2024-09-17 RX ORDER — VALSARTAN 40 MG/1
40 TABLET ORAL DAILY
Qty: 90 TABLET | Refills: 3 | Status: SHIPPED | OUTPATIENT
Start: 2024-09-17

## 2024-09-17 RX ORDER — METFORMIN HCL 500 MG
1000 TABLET, EXTENDED RELEASE 24 HR ORAL 2 TIMES DAILY
Qty: 360 TABLET | Refills: 3 | Status: SHIPPED | OUTPATIENT
Start: 2024-09-17

## 2024-09-17 RX ORDER — SIMVASTATIN 20 MG
20 TABLET ORAL EVERY EVENING
Qty: 90 TABLET | Refills: 3 | Status: SHIPPED | OUTPATIENT
Start: 2024-09-17

## 2024-09-17 RX ORDER — HYDRALAZINE HYDROCHLORIDE 100 MG/1
100 TABLET, FILM COATED ORAL 2 TIMES DAILY
Qty: 180 TABLET | Refills: 3 | Status: SHIPPED | OUTPATIENT
Start: 2024-09-17

## 2024-09-17 RX ORDER — AMLODIPINE BESYLATE 10 MG/1
10 TABLET ORAL DAILY
Qty: 90 TABLET | Refills: 3 | Status: SHIPPED | OUTPATIENT
Start: 2024-09-17

## 2024-09-17 RX ORDER — TRIAMTERENE/HYDROCHLOROTHIAZID 37.5-25 MG
1 TABLET ORAL DAILY
Qty: 90 TABLET | Refills: 3 | Status: SHIPPED | OUTPATIENT
Start: 2024-09-17

## 2024-09-17 SDOH — ECONOMIC STABILITY: INCOME INSECURITY: HOW HARD IS IT FOR YOU TO PAY FOR THE VERY BASICS LIKE FOOD, HOUSING, MEDICAL CARE, AND HEATING?: NOT HARD AT ALL

## 2024-09-17 SDOH — ECONOMIC STABILITY: FOOD INSECURITY: WITHIN THE PAST 12 MONTHS, YOU WORRIED THAT YOUR FOOD WOULD RUN OUT BEFORE YOU GOT MONEY TO BUY MORE.: NEVER TRUE

## 2024-09-17 SDOH — ECONOMIC STABILITY: FOOD INSECURITY: WITHIN THE PAST 12 MONTHS, THE FOOD YOU BOUGHT JUST DIDN'T LAST AND YOU DIDN'T HAVE MONEY TO GET MORE.: NEVER TRUE

## 2024-09-17 ASSESSMENT — PATIENT HEALTH QUESTIONNAIRE - PHQ9
SUM OF ALL RESPONSES TO PHQ QUESTIONS 1-9: 0
2. FEELING DOWN, DEPRESSED OR HOPELESS: NOT AT ALL
SUM OF ALL RESPONSES TO PHQ QUESTIONS 1-9: 0
SUM OF ALL RESPONSES TO PHQ9 QUESTIONS 1 & 2: 0
1. LITTLE INTEREST OR PLEASURE IN DOING THINGS: NOT AT ALL
SUM OF ALL RESPONSES TO PHQ QUESTIONS 1-9: 0
SUM OF ALL RESPONSES TO PHQ QUESTIONS 1-9: 0

## 2024-09-17 ASSESSMENT — LIFESTYLE VARIABLES
HOW OFTEN DO YOU HAVE A DRINK CONTAINING ALCOHOL: NEVER
HOW MANY STANDARD DRINKS CONTAINING ALCOHOL DO YOU HAVE ON A TYPICAL DAY: PATIENT DOES NOT DRINK

## 2024-11-22 ENCOUNTER — OFFICE VISIT (OUTPATIENT)
Dept: UROLOGY | Age: 69
End: 2024-11-22
Payer: COMMERCIAL

## 2024-11-22 DIAGNOSIS — N21.0 BLADDER STONE: Primary | ICD-10-CM

## 2024-11-22 DIAGNOSIS — N40.1 BPH WITH URINARY OBSTRUCTION: ICD-10-CM

## 2024-11-22 DIAGNOSIS — R33.9 URINARY RETENTION: ICD-10-CM

## 2024-11-22 DIAGNOSIS — C67.9 MALIGNANT NEOPLASM OF URINARY BLADDER, UNSPECIFIED SITE (HCC): ICD-10-CM

## 2024-11-22 DIAGNOSIS — N13.8 BPH WITH URINARY OBSTRUCTION: ICD-10-CM

## 2024-11-22 DIAGNOSIS — N21.0 CALCULUS IN BLADDER: ICD-10-CM

## 2024-11-22 LAB
BILIRUBIN, URINE, POC: NEGATIVE
BLOOD URINE, POC: NEGATIVE
GLUCOSE URINE, POC: NEGATIVE MG/DL
KETONES, URINE, POC: NEGATIVE MG/DL
LEUKOCYTE ESTERASE, URINE, POC: NORMAL
NITRITE, URINE, POC: NEGATIVE
PH, URINE, POC: 5.5 (ref 4.6–8)
PROTEIN,URINE, POC: 30 MG/DL
SPECIFIC GRAVITY, URINE, POC: 1.01 (ref 1–1.03)
URINALYSIS CLARITY, POC: NORMAL
URINALYSIS COLOR, POC: NORMAL
UROBILINOGEN, POC: NORMAL MG/DL

## 2024-11-22 PROCEDURE — 1123F ACP DISCUSS/DSCN MKR DOCD: CPT | Performed by: UROLOGY

## 2024-11-22 PROCEDURE — 1036F TOBACCO NON-USER: CPT | Performed by: UROLOGY

## 2024-11-22 PROCEDURE — G8484 FLU IMMUNIZE NO ADMIN: HCPCS | Performed by: UROLOGY

## 2024-11-22 PROCEDURE — G8417 CALC BMI ABV UP PARAM F/U: HCPCS | Performed by: UROLOGY

## 2024-11-22 PROCEDURE — 3017F COLORECTAL CA SCREEN DOC REV: CPT | Performed by: UROLOGY

## 2024-11-22 PROCEDURE — 99213 OFFICE O/P EST LOW 20 MIN: CPT | Performed by: UROLOGY

## 2024-11-22 PROCEDURE — 81003 URINALYSIS AUTO W/O SCOPE: CPT | Performed by: UROLOGY

## 2024-11-22 PROCEDURE — G8427 DOCREV CUR MEDS BY ELIG CLIN: HCPCS | Performed by: UROLOGY

## 2024-11-22 ASSESSMENT — ENCOUNTER SYMPTOMS
BACK PAIN: 0
NAUSEA: 0

## 2024-11-22 NOTE — PROGRESS NOTES
[facility]  [address] [place] [address]  [phone]    Sai Emmanuel Jr.  : 1955    Chief Complaint   Patient presents with    Follow-up        HPI     Sai Emmanuel Jr. is a 69 y.o. male   With hx of bladder cancer, elevated PSA.   S/p TURBT 10-8-12, for stage T1 bladder cancer. completed BCG maintenance. s/p TURP/litholapaxy in , urethral stricture.     Had a papillary tumor at the right dome and right lateral wall.   Pathology shows papillary high-grade urothelial carcinoma with   focal superficial lamina propria invasion from the bladder dome   tumor. Muscularis propria was not identified.   We also did a separate biopsy of the bladder dome adjacent to the   tumor. It shows high grade urothelial carcinoma with associated   superficial lamina propria invasion, muscularis propria not   identified.   The right lateral wall tumor shows papillary high-grade   urothelial carcinoma, definitive features of invasion are not   identified, muscularis propria is not identified.   We did a re-biopsy of the resection base. There is focally   necrotic tissue present, no carcinoma noted.   Completed 6 week induction course of BCG in .   Follow up cysto showed tumor on left dome, flat red area right   dome.   S/P TURBT on 13. Papillary tumor left dome shows high grade   papillary tumor, no invasion. Right dome flat area shows focal   features of high grade urothelial carcinoma, Separate fragment of dysplastic urothelium. Small fragment of muscularis present,   uninvolved by tumor.   Cystoscopy in 2013 shows BPH, 4+ trabeculation, no tumors.   Atypical cytology with negative fish test.--S/P Cystoscopy with   bladder biopsy on 10/17/13. Pathology revealed urothelial atypia.     Has fnished 6 weeks of BCG at end of .  Continues on Flomax. Completed  BCG maintenance.   Cytology in  showed atypical cells, but negative FISH. Had 3   week BCG

## 2025-03-04 ENCOUNTER — OFFICE VISIT (OUTPATIENT)
Age: 70
End: 2025-03-04
Payer: COMMERCIAL

## 2025-03-04 VITALS
WEIGHT: 229 LBS | HEIGHT: 71 IN | BODY MASS INDEX: 32.06 KG/M2 | SYSTOLIC BLOOD PRESSURE: 168 MMHG | DIASTOLIC BLOOD PRESSURE: 90 MMHG | HEART RATE: 70 BPM

## 2025-03-04 DIAGNOSIS — G47.33 OSA (OBSTRUCTIVE SLEEP APNEA): ICD-10-CM

## 2025-03-04 DIAGNOSIS — R00.2 PALPITATION: ICD-10-CM

## 2025-03-04 DIAGNOSIS — I10 ESSENTIAL HYPERTENSION: ICD-10-CM

## 2025-03-04 DIAGNOSIS — I25.10 ATHEROSCLEROSIS OF NATIVE CORONARY ARTERY OF NATIVE HEART WITHOUT ANGINA PECTORIS: Primary | ICD-10-CM

## 2025-03-04 PROCEDURE — 99214 OFFICE O/P EST MOD 30 MIN: CPT | Performed by: INTERNAL MEDICINE

## 2025-03-04 PROCEDURE — 3017F COLORECTAL CA SCREEN DOC REV: CPT | Performed by: INTERNAL MEDICINE

## 2025-03-04 PROCEDURE — G8428 CUR MEDS NOT DOCUMENT: HCPCS | Performed by: INTERNAL MEDICINE

## 2025-03-04 PROCEDURE — 1036F TOBACCO NON-USER: CPT | Performed by: INTERNAL MEDICINE

## 2025-03-04 PROCEDURE — 3078F DIAST BP <80 MM HG: CPT | Performed by: INTERNAL MEDICINE

## 2025-03-04 PROCEDURE — 3077F SYST BP >= 140 MM HG: CPT | Performed by: INTERNAL MEDICINE

## 2025-03-04 PROCEDURE — 1123F ACP DISCUSS/DSCN MKR DOCD: CPT | Performed by: INTERNAL MEDICINE

## 2025-03-04 PROCEDURE — G8417 CALC BMI ABV UP PARAM F/U: HCPCS | Performed by: INTERNAL MEDICINE

## 2025-03-04 PROCEDURE — 93000 ELECTROCARDIOGRAM COMPLETE: CPT | Performed by: INTERNAL MEDICINE

## 2025-03-04 NOTE — PROGRESS NOTES
[address] [facility], [place] [address]  PHONE: [phone]     25    NAME:  Sai Emmanuel Jr.  : 1955  MRN: [id number]       SUBJECTIVE:   Sai Emmanuel Jr. is a 69 y.o. male seen for a follow up visit regarding the following:     Chief Complaint   Patient presents with    Coronary Artery Disease    Follow-up       HPI: Here for CAD.   CAD (mild CAD  [facility])   HTN (Ace cough)  Echo 10/2022: normal EF, LVH  NST 2024: Findings suggest a low risk of cardiac events.        NO more CP, no new PALACIOS.  Feeling better now.  NO new edema.    Home -130s.  better at home.      NO new palp.    Patient denies recent history of orthopnea, PND, excessive dizziness and/or syncope.      Followed for bladder CA, no issues, followed yearly now.            Past Medical History, Past Surgical History, Family history, Social History, and Medications were all reviewed with the patient today and updated as necessary.     Current Outpatient Medications   Medication Sig Dispense Refill    Lactobacillus (PROBIOTIC ACIDOPHILUS PO) Take by mouth      Multiple Vitamin (MULTIVITAMIN ADULT PO) Take by mouth      metFORMIN (GLUCOPHAGE-XR) 500 MG extended release tablet Take 2 tablets by mouth in the morning and at bedtime 360 tablet 3    simvastatin (ZOCOR) 20 MG tablet Take 1 tablet by mouth every evening 90 tablet 3    amLODIPine (NORVASC) 10 MG tablet Take 1 tablet by mouth daily 90 tablet 3    hydrALAZINE (APRESOLINE) 100 MG tablet Take 1 tablet by mouth 2 times daily 180 tablet 3    valsartan (DIOVAN) 40 MG tablet Take 1 tablet by mouth daily 90 tablet 3    triamterene-hydroCHLOROthiazide (MAXZIDE-25) 37.5-25 MG per tablet Take 1 tablet by mouth daily 90 tablet 3     No current facility-administered medications for this visit.        Allergies   Allergen Reactions    Macadamia Nut Oil Anaphylaxis     Tongue and throat swelling with almonds. States that he recently noticed symptoms

## 2025-04-08 ENCOUNTER — TELEPHONE (OUTPATIENT)
Dept: FAMILY MEDICINE CLINIC | Facility: CLINIC | Age: 70
End: 2025-04-08

## 2025-04-08 NOTE — TELEPHONE ENCOUNTER
Called patient to request voluntary participation in [facility] Hypertension project which included patient self monitoring of arm blood pressure over 12 weeks as well as patient education. Patient agreeable to participate. Appt scheduled for 04/14 to come and receive BP cuff and education.

## 2025-04-14 ENCOUNTER — CLINICAL SUPPORT (OUTPATIENT)
Dept: FAMILY MEDICINE CLINIC | Facility: CLINIC | Age: 70
End: 2025-04-14

## 2025-04-14 VITALS — SYSTOLIC BLOOD PRESSURE: 177 MMHG | DIASTOLIC BLOOD PRESSURE: 88 MMHG | HEART RATE: 85 BPM

## 2025-04-14 NOTE — PROGRESS NOTES
Pt came by the office today to start the HTN . Pt was given a bp cuff and educated on use as well as lifestyle changes to help control HTN. Pt informed I will be reaching out to them weekly to get updates on their readings for the duration of 12 weeks.

## 2025-04-23 ENCOUNTER — TELEPHONE (OUTPATIENT)
Dept: FAMILY MEDICINE CLINIC | Facility: CLINIC | Age: 70
End: 2025-04-23

## 2025-05-05 ENCOUNTER — OFFICE VISIT (OUTPATIENT)
Dept: FAMILY MEDICINE CLINIC | Facility: CLINIC | Age: 70
End: 2025-05-05
Payer: COMMERCIAL

## 2025-05-05 VITALS
HEART RATE: 85 BPM | TEMPERATURE: 97.3 F | DIASTOLIC BLOOD PRESSURE: 68 MMHG | HEIGHT: 71 IN | BODY MASS INDEX: 31.92 KG/M2 | OXYGEN SATURATION: 98 % | SYSTOLIC BLOOD PRESSURE: 140 MMHG | WEIGHT: 228 LBS

## 2025-05-05 DIAGNOSIS — I25.10 ATHEROSCLEROSIS OF NATIVE CORONARY ARTERY OF NATIVE HEART WITHOUT ANGINA PECTORIS: ICD-10-CM

## 2025-05-05 DIAGNOSIS — E78.00 HIGH CHOLESTEROL: ICD-10-CM

## 2025-05-05 DIAGNOSIS — E11.9 TYPE 2 DIABETES MELLITUS WITHOUT COMPLICATION, WITHOUT LONG-TERM CURRENT USE OF INSULIN (HCC): Primary | ICD-10-CM

## 2025-05-05 DIAGNOSIS — E11.9 TYPE 2 DIABETES MELLITUS WITHOUT COMPLICATION, WITHOUT LONG-TERM CURRENT USE OF INSULIN (HCC): ICD-10-CM

## 2025-05-05 DIAGNOSIS — I10 ESSENTIAL HYPERTENSION: ICD-10-CM

## 2025-05-05 LAB
ALBUMIN SERPL-MCNC: 3.9 G/DL (ref 3.2–4.6)
ALBUMIN/GLOB SERPL: 1.1 (ref 1–1.9)
ALP SERPL-CCNC: 93 U/L (ref 40–129)
ALT SERPL-CCNC: 20 U/L (ref 8–55)
ANION GAP SERPL CALC-SCNC: 12 MMOL/L (ref 7–16)
AST SERPL-CCNC: 27 U/L (ref 15–37)
BILIRUB SERPL-MCNC: 0.9 MG/DL (ref 0–1.2)
BUN SERPL-MCNC: 13 MG/DL (ref 8–23)
CALCIUM SERPL-MCNC: 9.5 MG/DL (ref 8.8–10.2)
CHLORIDE SERPL-SCNC: 107 MMOL/L (ref 98–107)
CHOLEST SERPL-MCNC: 135 MG/DL (ref 0–200)
CO2 SERPL-SCNC: 23 MMOL/L (ref 20–29)
CREAT SERPL-MCNC: 0.92 MG/DL (ref 0.8–1.3)
EST. AVERAGE GLUCOSE BLD GHB EST-MCNC: 196 MG/DL
GLOBULIN SER CALC-MCNC: 3.5 G/DL (ref 2.3–3.5)
GLUCOSE SERPL-MCNC: 139 MG/DL (ref 70–99)
HBA1C MFR BLD: 8.5 % (ref 0–5.6)
HDLC SERPL-MCNC: 48 MG/DL (ref 40–60)
HDLC SERPL: 2.8 (ref 0–5)
LDLC SERPL CALC-MCNC: 64 MG/DL (ref 0–100)
POTASSIUM SERPL-SCNC: 3.6 MMOL/L (ref 3.5–5.1)
PROT SERPL-MCNC: 7.4 G/DL (ref 6.3–8.2)
SODIUM SERPL-SCNC: 141 MMOL/L (ref 136–145)
TRIGL SERPL-MCNC: 113 MG/DL (ref 0–150)
VLDLC SERPL CALC-MCNC: 23 MG/DL (ref 6–23)

## 2025-05-05 PROCEDURE — G8417 CALC BMI ABV UP PARAM F/U: HCPCS | Performed by: FAMILY MEDICINE

## 2025-05-05 PROCEDURE — 3017F COLORECTAL CA SCREEN DOC REV: CPT | Performed by: FAMILY MEDICINE

## 2025-05-05 PROCEDURE — 3077F SYST BP >= 140 MM HG: CPT | Performed by: FAMILY MEDICINE

## 2025-05-05 PROCEDURE — 99214 OFFICE O/P EST MOD 30 MIN: CPT | Performed by: FAMILY MEDICINE

## 2025-05-05 PROCEDURE — 1123F ACP DISCUSS/DSCN MKR DOCD: CPT | Performed by: FAMILY MEDICINE

## 2025-05-05 PROCEDURE — 2022F DILAT RTA XM EVC RTNOPTHY: CPT | Performed by: FAMILY MEDICINE

## 2025-05-05 PROCEDURE — 1036F TOBACCO NON-USER: CPT | Performed by: FAMILY MEDICINE

## 2025-05-05 PROCEDURE — 3078F DIAST BP <80 MM HG: CPT | Performed by: FAMILY MEDICINE

## 2025-05-05 PROCEDURE — 3046F HEMOGLOBIN A1C LEVEL >9.0%: CPT | Performed by: FAMILY MEDICINE

## 2025-05-05 PROCEDURE — G8427 DOCREV CUR MEDS BY ELIG CLIN: HCPCS | Performed by: FAMILY MEDICINE

## 2025-05-05 RX ORDER — VALSARTAN 80 MG/1
80 TABLET ORAL DAILY
Qty: 90 TABLET | Refills: 3 | Status: SHIPPED | OUTPATIENT
Start: 2025-05-05

## 2025-05-05 RX ORDER — DAPAGLIFLOZIN 5 MG/1
5 TABLET, FILM COATED ORAL EVERY MORNING
Qty: 90 TABLET | Refills: 1 | Status: SHIPPED | OUTPATIENT
Start: 2025-05-05

## 2025-05-05 SDOH — ECONOMIC STABILITY: FOOD INSECURITY: WITHIN THE PAST 12 MONTHS, YOU WORRIED THAT YOUR FOOD WOULD RUN OUT BEFORE YOU GOT MONEY TO BUY MORE.: NEVER TRUE

## 2025-05-05 SDOH — ECONOMIC STABILITY: FOOD INSECURITY: WITHIN THE PAST 12 MONTHS, THE FOOD YOU BOUGHT JUST DIDN'T LAST AND YOU DIDN'T HAVE MONEY TO GET MORE.: NEVER TRUE

## 2025-05-05 ASSESSMENT — PATIENT HEALTH QUESTIONNAIRE - PHQ9
1. LITTLE INTEREST OR PLEASURE IN DOING THINGS: NOT AT ALL
SUM OF ALL RESPONSES TO PHQ QUESTIONS 1-9: 0
2. FEELING DOWN, DEPRESSED OR HOPELESS: NOT AT ALL
SUM OF ALL RESPONSES TO PHQ QUESTIONS 1-9: 0

## 2025-05-05 NOTE — PROGRESS NOTES
[facility] [place] [address]  Phone: [phone] Fax [phone]  Shayna Dailey MD  5/5/2025         Mr. Emmanuel  is a 69 y.o.  year old  male patient who comes in to check on diabetes, hypertension, and high cholesterol.     History of Present Illness  The patient presents for evaluation of diabetes and hypertension.    He has been actively engaged in weight loss efforts, which have yielded positive results. Blood glucose levels have consistently remained elevated, with readings typically ranging from 140 to 150. The most recent HbA1c level was recorded at 8.2. He reports no symptoms of numbness or tingling. Treadmill exercises have been incorporated into his routine as part of his weight loss strategy and to reduce dependence on metformin. Current medication regimen includes metformin extended release, administered as two tablets in the morning and two at night.    Blood pressure has been monitored at home using a cuff device, consistently recording readings in the range of 160s, 170s, and 150s. During today's visit, blood pressure was measured at 140. Participation in a program for the past two weeks has not resulted in blood pressure readings as low as 140.   Current antihypertensive therapy includes amlodipine 10 mg, hydralazine 100 mg twice daily, and valsartan 40 mg once daily.      Mr. Emmanuel  has  has a past medical history of Arthritis, Bladder cancer ([facility]), Bladder stones, Chronic pain, Colon polyps, Coronary atherosclerosis of native coronary vessel, COVID-19 vaccine series completed, DDD (degenerative disc disease), Diabetes (HCC), GERD (gastroesophageal reflux disease), History of kidney stones, Hypertension, Hypertrophy of prostate with urinary obstruction and other lower urinary tract symptoms (LUTS), Malignant neoplasm of bladder, part unspecified, Morbid obesity ([facility]), Prediabetes, Pure hypercholesterolemia, Sleep apnea, and Urinary

## 2025-05-06 ENCOUNTER — RESULTS FOLLOW-UP (OUTPATIENT)
Dept: FAMILY MEDICINE CLINIC | Facility: CLINIC | Age: 70
End: 2025-05-06

## 2025-05-06 NOTE — RESULT ENCOUNTER NOTE
Let patient know labs normal except his A1c is up to 8.5.  Get the Farxiga as we talked about follow-up in 1 month and in 4 months as we discussed.

## 2025-06-05 ENCOUNTER — OFFICE VISIT (OUTPATIENT)
Dept: FAMILY MEDICINE CLINIC | Facility: CLINIC | Age: 70
End: 2025-06-05

## 2025-06-05 VITALS
SYSTOLIC BLOOD PRESSURE: 144 MMHG | DIASTOLIC BLOOD PRESSURE: 72 MMHG | HEART RATE: 64 BPM | WEIGHT: 224.2 LBS | OXYGEN SATURATION: 97 % | BODY MASS INDEX: 31.39 KG/M2 | HEIGHT: 71 IN | RESPIRATION RATE: 16 BRPM | TEMPERATURE: 98 F

## 2025-06-05 DIAGNOSIS — E11.9 TYPE 2 DIABETES MELLITUS WITHOUT COMPLICATION, WITHOUT LONG-TERM CURRENT USE OF INSULIN (HCC): ICD-10-CM

## 2025-06-05 DIAGNOSIS — I10 ESSENTIAL HYPERTENSION: Primary | ICD-10-CM

## 2025-06-05 RX ORDER — VALSARTAN 160 MG/1
160 TABLET ORAL DAILY
Qty: 90 TABLET | Refills: 1 | Status: SHIPPED | OUTPATIENT
Start: 2025-06-05

## 2025-06-05 ASSESSMENT — ENCOUNTER SYMPTOMS: SHORTNESS OF BREATH: 0

## 2025-06-05 ASSESSMENT — PATIENT HEALTH QUESTIONNAIRE - PHQ9
SUM OF ALL RESPONSES TO PHQ QUESTIONS 1-9: 0
1. LITTLE INTEREST OR PLEASURE IN DOING THINGS: NOT AT ALL
2. FEELING DOWN, DEPRESSED OR HOPELESS: NOT AT ALL
SUM OF ALL RESPONSES TO PHQ QUESTIONS 1-9: 0

## 2025-06-05 NOTE — PROGRESS NOTES
[facility] [address] [place] [address]  Phone [phone]      Patient: Sai Emmanuel  YOB: 1955  Age 69 y.o.  Sex male  Medical Record:  [id number]  Visit Date: 06/05/25  Author:  Domo Alcantara PA-C    Family Practice Clinic Note    Chief Complaint   Patient presents with    Hypertension     1 month follow up       History of Present Illness  History of Present Illness  The patient returns today for follow-up regarding hypertension. At the last visit, his dose of valsartan was increased from 40 to 80 mg daily, and he continues on amlodipine 10 mg daily and hydralazine 100 mg twice daily. He reports no adverse effects from the increased dosage of valsartan. Despite this, his systolic blood pressure remain elevated by his reported home readings ranging 140-160 systolic. He has been monitoring his blood pressure at home using a provided cuff, taking readings several days a week week, and then forwarding the data to the nurse. He is not experiencing any chest pain, shortness of breath, or swelling in his feet or ankles.    He has a history of diabetes and his hemoglobin A1c was noted to be elevated at 8.5, last visit.  He was started on Farxiga 5 mg daily to take in addition to metformin  mg, 2 tablets twice daily.  He does not have any issues with Farxiga. He monitors his blood sugar levels at home once or twice a week, with the most recent reading being approximately 130 around 8:00 AM fasting.      Past History:    Past Medical history   Past Medical History:   Diagnosis Date    Arthritis     back-     Bladder cancer (HCC)     Bladder stones     Chronic pain     back--pain comes and goes    Colon polyps 2/7/2013    Coronary atherosclerosis of native coronary vessel 03/28/2016    no mi/ no stents---- followed by dr lisa ALLEN-19 vaccine series completed 03/29/2021    moderna    DDD (degenerative disc disease) 02/07/2013    lower back    Diabetes

## 2025-06-05 NOTE — PATIENT INSTRUCTIONS
*Increase Valsartan to 160 mg a day. You can take two of the 80 mg tablets daily until you run out and then fill the 160 mg prescription.  *Please keep track of your blood pressure. Check it 3-4 days a week. Write down your results and bring them with you to your next office visit for review. We'd like you to stay < 140/90, and ideally < 130/80. Let us know if you find yourself above this routinely.

## 2025-07-14 NOTE — PROGRESS NOTES
[facility]  [address]  [place] [address]  [phone]    Patient Name:  Sai Emmanuel Jr.  YOB: 1955      Office Visit 7/17/2025    CHIEF COMPLAINT:    Chief Complaint   Patient presents with    Sleep Apnea    Follow-up         HISTORY OF PRESENT ILLNESS:  Patient is an 69 y.o. male seen today for follow up of AMY. Pt had a PSG/HST on 11/8/17 with an AHI of 25.5/hr with desaturations to 79%. Pt is on CPAP 10-15 cm H2O.   Pt reports that he is doing well overall. He is having issues with his CPAP machine. It will not turn on automatically like it used to. He has had this machine for almost 8 years. He also reports that the door on the side where the filter is will not stay on. He is in need of a new machine.   Pt reports that overall he sleeps well. He reports good energy during the day. He uses a full face mask and gets supplies from [facility].   Pt has excellent compliance with use 360/365 days with an average use of 6 hrs and 36 mins per day. Pt has a mask leak of 15.3L/min with an AHI of 0.2/hr. Pt is benefiting and tolerating PAP therapy.           7/17/2025     7:45 AM 7/10/2024     2:02 PM 7/6/2023     8:24 AM 3/7/2022     8:00 AM   Sleep Medicine   Sitting and reading 1 1 1 1   Watching TV 1 1 1 1   Sitting, inactive in a public place (e.g. a theatre or a meeting) 1 1 0 1   As a passenger in a car for an hour without a break 3 3 0 1   Lying down to rest in the afternoon when circumstances permit 1 0 3 1   Sitting and talking to someone 0 0 0 1   Sitting quietly after a lunch without alcohol 1 3 1 1   In a car, while stopped for a few minutes in traffic 1 0 0 1   [place] Sleepiness Score 9 9 6 8         Past Medical History:   Diagnosis Date    Arthritis     back-     Bladder cancer (HCC)     Bladder stones     Chronic pain     back--pain comes and goes    Colon polyps 2/7/2013    Coronary atherosclerosis of native coronary vessel 03/28/2016    no mi/ no

## 2025-07-17 ENCOUNTER — OFFICE VISIT (OUTPATIENT)
Dept: SLEEP MEDICINE | Age: 70
End: 2025-07-17
Payer: COMMERCIAL

## 2025-07-17 VITALS
DIASTOLIC BLOOD PRESSURE: 83 MMHG | WEIGHT: 225 LBS | BODY MASS INDEX: 31.5 KG/M2 | OXYGEN SATURATION: 97 % | HEART RATE: 66 BPM | SYSTOLIC BLOOD PRESSURE: 151 MMHG | HEIGHT: 71 IN

## 2025-07-17 DIAGNOSIS — G47.33 OSA (OBSTRUCTIVE SLEEP APNEA): Primary | ICD-10-CM

## 2025-07-17 DIAGNOSIS — G47.34 NOCTURNAL HYPOXEMIA: ICD-10-CM

## 2025-07-17 PROCEDURE — 3017F COLORECTAL CA SCREEN DOC REV: CPT | Performed by: PHYSICIAN ASSISTANT

## 2025-07-17 PROCEDURE — 3077F SYST BP >= 140 MM HG: CPT | Performed by: PHYSICIAN ASSISTANT

## 2025-07-17 PROCEDURE — 1123F ACP DISCUSS/DSCN MKR DOCD: CPT | Performed by: PHYSICIAN ASSISTANT

## 2025-07-17 PROCEDURE — 3079F DIAST BP 80-89 MM HG: CPT | Performed by: PHYSICIAN ASSISTANT

## 2025-07-17 PROCEDURE — 1036F TOBACCO NON-USER: CPT | Performed by: PHYSICIAN ASSISTANT

## 2025-07-17 PROCEDURE — G8427 DOCREV CUR MEDS BY ELIG CLIN: HCPCS | Performed by: PHYSICIAN ASSISTANT

## 2025-07-17 PROCEDURE — G8417 CALC BMI ABV UP PARAM F/U: HCPCS | Performed by: PHYSICIAN ASSISTANT

## 2025-07-17 PROCEDURE — 99213 OFFICE O/P EST LOW 20 MIN: CPT | Performed by: PHYSICIAN ASSISTANT

## 2025-07-17 ASSESSMENT — SLEEP AND FATIGUE QUESTIONNAIRES
ESS TOTAL SCORE: 9
HOW LIKELY ARE YOU TO NOD OFF OR FALL ASLEEP WHILE SITTING AND READING: SLIGHT CHANCE OF DOZING
HOW LIKELY ARE YOU TO NOD OFF OR FALL ASLEEP WHILE SITTING INACTIVE IN A PUBLIC PLACE: SLIGHT CHANCE OF DOZING
HOW LIKELY ARE YOU TO NOD OFF OR FALL ASLEEP WHILE SITTING AND TALKING TO SOMEONE: WOULD NEVER DOZE
HOW LIKELY ARE YOU TO NOD OFF OR FALL ASLEEP WHEN YOU ARE A PASSENGER IN A CAR FOR AN HOUR WITHOUT A BREAK: HIGH CHANCE OF DOZING
HOW LIKELY ARE YOU TO NOD OFF OR FALL ASLEEP WHILE LYING DOWN TO REST IN THE AFTERNOON WHEN CIRCUMSTANCES PERMIT: SLIGHT CHANCE OF DOZING
HOW LIKELY ARE YOU TO NOD OFF OR FALL ASLEEP IN A CAR, WHILE STOPPED FOR A FEW MINUTES IN TRAFFIC: SLIGHT CHANCE OF DOZING
HOW LIKELY ARE YOU TO NOD OFF OR FALL ASLEEP WHILE WATCHING TV: SLIGHT CHANCE OF DOZING
HOW LIKELY ARE YOU TO NOD OFF OR FALL ASLEEP WHILE SITTING QUIETLY AFTER LUNCH WITHOUT ALCOHOL: SLIGHT CHANCE OF DOZING

## 2025-08-05 ENCOUNTER — OFFICE VISIT (OUTPATIENT)
Dept: FAMILY MEDICINE CLINIC | Facility: CLINIC | Age: 70
End: 2025-08-05

## 2025-08-05 VITALS
RESPIRATION RATE: 16 BRPM | OXYGEN SATURATION: 99 % | HEART RATE: 74 BPM | WEIGHT: 225 LBS | HEIGHT: 71 IN | DIASTOLIC BLOOD PRESSURE: 71 MMHG | BODY MASS INDEX: 31.5 KG/M2 | TEMPERATURE: 97.3 F | SYSTOLIC BLOOD PRESSURE: 136 MMHG

## 2025-08-05 DIAGNOSIS — E78.00 HIGH CHOLESTEROL: ICD-10-CM

## 2025-08-05 DIAGNOSIS — E11.9 TYPE 2 DIABETES MELLITUS WITHOUT COMPLICATION, WITHOUT LONG-TERM CURRENT USE OF INSULIN (HCC): ICD-10-CM

## 2025-08-05 DIAGNOSIS — I25.10 ATHEROSCLEROSIS OF NATIVE CORONARY ARTERY OF NATIVE HEART WITHOUT ANGINA PECTORIS: ICD-10-CM

## 2025-08-05 DIAGNOSIS — I10 ESSENTIAL HYPERTENSION: Primary | ICD-10-CM

## 2025-08-05 RX ORDER — METFORMIN HYDROCHLORIDE 500 MG/1
1000 TABLET, EXTENDED RELEASE ORAL 2 TIMES DAILY
Qty: 360 TABLET | Refills: 3 | Status: SHIPPED | OUTPATIENT
Start: 2025-08-05

## 2025-08-05 RX ORDER — HYDRALAZINE HYDROCHLORIDE 100 MG/1
100 TABLET, FILM COATED ORAL 2 TIMES DAILY
Qty: 180 TABLET | Refills: 3 | Status: SHIPPED | OUTPATIENT
Start: 2025-08-05

## 2025-08-05 RX ORDER — AMLODIPINE BESYLATE 10 MG/1
10 TABLET ORAL DAILY
Qty: 90 TABLET | Refills: 3 | Status: SHIPPED | OUTPATIENT
Start: 2025-08-05

## 2025-08-05 RX ORDER — SIMVASTATIN 20 MG
20 TABLET ORAL EVERY EVENING
Qty: 90 TABLET | Refills: 3 | Status: SHIPPED | OUTPATIENT
Start: 2025-08-05

## (undated) DEVICE — TURP TURB: Brand: MEDLINE INDUSTRIES, INC.

## (undated) DEVICE — SYRINGE,TOOMEY,IRRIGATION,70CC,STERILE: Brand: MEDLINE

## (undated) DEVICE — SOLUTION IRRIG 3000ML H2O STRL BAG

## (undated) DEVICE — LASER SURG FIBER 1000 MH RND TIP HOLM SMARTSCOPE DISP

## (undated) DEVICE — SOL IRR STRL H2O 1000ML BTL --

## (undated) DEVICE — GOWN,REINFORCED,POLY,AURORA,XXLARGE,STR: Brand: MEDLINE

## (undated) DEVICE — SYR 5ML 1/5 GRAD LL NSAF LF --

## (undated) DEVICE — BAG,DRAINAGE,4L,A/R TOWER,LL,SLIDE TAP: Brand: MEDLINE

## (undated) DEVICE — CONTAINER,SPECIMEN,O.R.STRL,4.5OZ: Brand: MEDLINE

## (undated) DEVICE — AMPLATZ TYPE GRADUATED RENAL DILATATION SET

## (undated) DEVICE — SOLUTION IRRIG 3000ML 1.5% GL USP UROMATIC CONT

## (undated) DEVICE — GARMENT,MEDLINE,DVT,INT,CALF,MED, GEN2: Brand: MEDLINE

## (undated) DEVICE — GLOVE SURG SZ 8 CRM LTX FREE POLYISOPRENE POLYMER BEAD ANTI

## (undated) DEVICE — GUIDEWIRE UROLOGICAL STR 3 CM 0.038 IN X150 CM FIX STIFF LF

## (undated) DEVICE — SYR 3ML LL TIP 1/10ML GRAD --

## (undated) DEVICE — TRAY PREP DRY W/ PREM GLV 2 APPL 6 SPNG 2 UNDPD 1 OVERWRAP

## (undated) DEVICE — CATHETER URETH 24FR BLLN 30CC STD LTX 3 W TWO OPP DRNGE EYE

## (undated) DEVICE — ELECTRODE LOOP 27FR WIRE DIA0.35MM BRN CUT ANG 1 STEM W/

## (undated) DEVICE — CATHETER URETH 20FR BLLN 5CC F 2 W SPEC M OLV COUDE TIP

## (undated) DEVICE — Device

## (undated) DEVICE — CONNECTOR TBNG OD5-7MM O2 END DISP

## (undated) DEVICE — PACK SURGICAL PROCEDURE KIT CYSTOSCOPY TOTE

## (undated) DEVICE — KENDALL RADIOLUCENT FOAM MONITORING ELECTRODE RECTANGULAR SHAPE: Brand: KENDALL

## (undated) DEVICE — CANNULA NSL ORAL AD FOR CAPNOFLEX CO2 O2 AIRLFE

## (undated) DEVICE — NDL PRT INJ NSAF BLNT 18GX1.5 --

## (undated) DEVICE — SOLUTION IRRIGATION STRL H2O 3000 ML 4/CA

## (undated) DEVICE — SOLUTION IRRIG 1000ML STRL H2O USP PLAS POUR BTL

## (undated) DEVICE — DEVICE STBL AD TRICOT ANCHR PD FOR 3 W F CATH STATLOK

## (undated) DEVICE — CYSTO/BLADDER IRRIGATION SET, REGULATING CLAMP

## (undated) DEVICE — SKIN PREP TRAY 4 COMPARTM TRAY: Brand: MEDLINE INDUSTRIES, INC.